# Patient Record
Sex: MALE | Race: WHITE | Employment: FULL TIME | URBAN - METROPOLITAN AREA
[De-identification: names, ages, dates, MRNs, and addresses within clinical notes are randomized per-mention and may not be internally consistent; named-entity substitution may affect disease eponyms.]

---

## 2017-02-01 ENCOUNTER — LAB ENCOUNTER (OUTPATIENT)
Dept: LAB | Age: 42
End: 2017-02-01
Attending: INTERNAL MEDICINE
Payer: COMMERCIAL

## 2017-02-01 ENCOUNTER — OFFICE VISIT (OUTPATIENT)
Dept: FAMILY MEDICINE CLINIC | Facility: CLINIC | Age: 42
End: 2017-02-01

## 2017-02-01 VITALS
HEART RATE: 62 BPM | TEMPERATURE: 98 F | SYSTOLIC BLOOD PRESSURE: 134 MMHG | BODY MASS INDEX: 33.74 KG/M2 | HEIGHT: 71 IN | RESPIRATION RATE: 16 BRPM | WEIGHT: 241 LBS | DIASTOLIC BLOOD PRESSURE: 84 MMHG

## 2017-02-01 DIAGNOSIS — M10.079 IDIOPATHIC GOUT OF ANKLE, UNSPECIFIED CHRONICITY, UNSPECIFIED LATERALITY: ICD-10-CM

## 2017-02-01 DIAGNOSIS — E66.9 OBESITY (BMI 30-39.9): ICD-10-CM

## 2017-02-01 DIAGNOSIS — R94.31 ABNORMAL EKG: ICD-10-CM

## 2017-02-01 DIAGNOSIS — R53.82 CHRONIC FATIGUE: ICD-10-CM

## 2017-02-01 DIAGNOSIS — E78.5 DYSLIPIDEMIA: ICD-10-CM

## 2017-02-01 DIAGNOSIS — E56.9 MULTIPLE VITAMIN DEFICIENCY DISEASE: ICD-10-CM

## 2017-02-01 DIAGNOSIS — N17.9 ACUTE RENAL FAILURE, UNSPECIFIED ACUTE RENAL FAILURE TYPE (HCC): Primary | ICD-10-CM

## 2017-02-01 DIAGNOSIS — N17.9 ACUTE RENAL FAILURE, UNSPECIFIED ACUTE RENAL FAILURE TYPE (HCC): ICD-10-CM

## 2017-02-01 DIAGNOSIS — I10 BENIGN ESSENTIAL HTN: ICD-10-CM

## 2017-02-01 LAB
25-HYDROXYVITAMIN D (TOTAL): 21.5 NG/ML (ref 30–100)
ALBUMIN SERPL-MCNC: 4.3 G/DL (ref 3.5–4.8)
ALP LIVER SERPL-CCNC: 82 U/L (ref 45–117)
ALT SERPL-CCNC: 39 U/L (ref 17–63)
AST SERPL-CCNC: 33 U/L (ref 15–41)
BASOPHILS # BLD AUTO: 0.05 X10(3) UL (ref 0–0.1)
BASOPHILS NFR BLD AUTO: 0.8 %
BILIRUB SERPL-MCNC: 0.7 MG/DL (ref 0.1–2)
BILIRUB UR QL STRIP.AUTO: NEGATIVE
BUN BLD-MCNC: 19 MG/DL (ref 8–20)
CALCIUM BLD-MCNC: 8.7 MG/DL (ref 8.3–10.3)
CHLORIDE: 106 MMOL/L (ref 101–111)
CLARITY UR REFRACT.AUTO: CLEAR
CO2: 24 MMOL/L (ref 22–32)
COLOR UR AUTO: YELLOW
CREAT BLD-MCNC: 1.14 MG/DL (ref 0.7–1.3)
CREAT UR-SCNC: 281 MG/DL
EOSINOPHIL # BLD AUTO: 0.05 X10(3) UL (ref 0–0.3)
EOSINOPHIL NFR BLD AUTO: 0.8 %
ERYTHROCYTE [DISTWIDTH] IN BLOOD BY AUTOMATED COUNT: 13 % (ref 11.5–16)
FSH: 4.6 MIU/ML (ref 1.4–18.1)
GLUCOSE BLD-MCNC: 92 MG/DL (ref 70–99)
GLUCOSE UR STRIP.AUTO-MCNC: NEGATIVE MG/DL
HCT VFR BLD AUTO: 44.7 % (ref 37–53)
HGB BLD-MCNC: 15.4 G/DL (ref 13–17)
IMMATURE GRANULOCYTE COUNT: 0.02 X10(3) UL (ref 0–1)
IMMATURE GRANULOCYTE RATIO %: 0.3 %
IRON SATURATION: 32 % (ref 13–45)
IRON: 197 UG/DL (ref 45–182)
KETONES UR STRIP.AUTO-MCNC: NEGATIVE MG/DL
LH: 1.5 MIU/ML (ref 1.5–9.3)
LYMPHOCYTES # BLD AUTO: 2.2 X10(3) UL (ref 0.9–4)
LYMPHOCYTES NFR BLD AUTO: 35.4 %
M PROTEIN MFR SERPL ELPH: 7.6 G/DL (ref 6.1–8.3)
MCH RBC QN AUTO: 30.2 PG (ref 27–33.2)
MCHC RBC AUTO-ENTMCNC: 34.5 G/DL (ref 31–37)
MCV RBC AUTO: 87.6 FL (ref 80–99)
MONOCYTES # BLD AUTO: 0.53 X10(3) UL (ref 0.1–0.6)
MONOCYTES NFR BLD AUTO: 8.5 %
NEUTROPHIL ABS PRELIM: 3.36 X10 (3) UL (ref 1.3–6.7)
NEUTROPHILS # BLD AUTO: 3.36 X10(3) UL (ref 1.3–6.7)
NEUTROPHILS NFR BLD AUTO: 54.2 %
NITRITE UR QL STRIP.AUTO: NEGATIVE
PH UR STRIP.AUTO: 5 [PH] (ref 4.5–8)
PLATELET # BLD AUTO: 254 10(3)UL (ref 150–450)
POTASSIUM SERPL-SCNC: 3.6 MMOL/L (ref 3.6–5.1)
PROT UR STRIP.AUTO-MCNC: NEGATIVE MG/DL
RBC # BLD AUTO: 5.1 X10(6)UL (ref 4.3–5.7)
RBC UR QL AUTO: NEGATIVE
RED CELL DISTRIBUTION WIDTH-SD: 41.4 FL (ref 35.1–46.3)
SED RATE-ML: 5 MM/HR (ref 0–12)
SODIUM SERPL-SCNC: 139 MMOL/L (ref 136–144)
SODIUM SERPL-SCNC: 166 MMOL/L
SP GR UR STRIP.AUTO: 1.03 (ref 1–1.03)
TESTOSTERONE: 513 NG/DL (ref 241–827)
TOTAL IRON BINDING CAPACITY: 620 UG/DL (ref 298–536)
TRANSFERRIN: 416 MG/DL (ref 200–360)
TSI SER-ACNC: 1.8 MIU/ML (ref 0.35–5.5)
URIC ACID: 4.5 MG/DL (ref 2.4–8.7)
UROBILINOGEN UR STRIP.AUTO-MCNC: <2 MG/DL
WBC # BLD AUTO: 6.2 X10(3) UL (ref 4–13)

## 2017-02-01 PROCEDURE — 84300 ASSAY OF URINE SODIUM: CPT

## 2017-02-01 PROCEDURE — 36415 COLL VENOUS BLD VENIPUNCTURE: CPT

## 2017-02-01 PROCEDURE — 99214 OFFICE O/P EST MOD 30 MIN: CPT | Performed by: INTERNAL MEDICINE

## 2017-02-01 PROCEDURE — 84550 ASSAY OF BLOOD/URIC ACID: CPT

## 2017-02-01 PROCEDURE — 82306 VITAMIN D 25 HYDROXY: CPT

## 2017-02-01 PROCEDURE — 85652 RBC SED RATE AUTOMATED: CPT

## 2017-02-01 PROCEDURE — 85025 COMPLETE CBC W/AUTO DIFF WBC: CPT

## 2017-02-01 PROCEDURE — 84403 ASSAY OF TOTAL TESTOSTERONE: CPT

## 2017-02-01 PROCEDURE — 93000 ELECTROCARDIOGRAM COMPLETE: CPT | Performed by: INTERNAL MEDICINE

## 2017-02-01 PROCEDURE — 83002 ASSAY OF GONADOTROPIN (LH): CPT

## 2017-02-01 PROCEDURE — 82570 ASSAY OF URINE CREATININE: CPT

## 2017-02-01 PROCEDURE — 83540 ASSAY OF IRON: CPT

## 2017-02-01 PROCEDURE — 83001 ASSAY OF GONADOTROPIN (FSH): CPT

## 2017-02-01 PROCEDURE — 84443 ASSAY THYROID STIM HORMONE: CPT

## 2017-02-01 PROCEDURE — 81001 URINALYSIS AUTO W/SCOPE: CPT

## 2017-02-01 PROCEDURE — 80053 COMPREHEN METABOLIC PANEL: CPT

## 2017-02-01 PROCEDURE — 83550 IRON BINDING TEST: CPT

## 2017-02-01 RX ORDER — ALLOPURINOL 300 MG/1
300 TABLET ORAL DAILY
Qty: 90 TABLET | Refills: 4 | Status: SHIPPED | OUTPATIENT
Start: 2017-02-01 | End: 2017-02-27

## 2017-02-01 RX ORDER — PHENTERMINE HYDROCHLORIDE 37.5 MG/1
37.5 TABLET ORAL
Qty: 30 TABLET | Refills: 0 | Status: SHIPPED | OUTPATIENT
Start: 2017-02-01 | End: 2017-02-27

## 2017-02-01 RX ORDER — ALLOPURINOL 300 MG/1
300 TABLET ORAL DAILY
Qty: 90 TABLET | Refills: 4 | Status: SHIPPED | COMMUNITY
Start: 2017-02-01 | End: 2017-04-24

## 2017-02-01 NOTE — PATIENT INSTRUCTIONS
Thank you for choosing Umesh Connor MD at [place] [address]  To Do: Dominic Gill  1. Blood pressure is too high- read below  Checkup 1 month  Start monitoring it daily in morning  2. Labs today  3. Allopurinol refilled  4.  You should think about Meal replacement therapy once or twice a day (a liquid shake taking the place of a whole meal because it's low calorie) really works. Such as slimfast or medifast or optifast can buy online. But needs to be consistent and used for long term.   Even a Seatt your doctor. 8. You want to eat quality food. Nutritious food. EAT all natural foods high in fiber, foods that are in the fresh produce section such as salad fruit vegetables, berries, nuts, lean protein like fish, chicken.   Avoid processed foods lik Are Associated With High Blood Pressure? Several potentially serious health conditions are linked to high blood pressure, including:   Atherosclerosis: a disease of the arteries caused by a buildup of plaque, or fatty material, on the inside walls of the b pressure is too high, treatment may help prevent damage to your body's organs. By [facility] staff   DASH stands for Dietary Approaches to Stop Hypertension.  The DASH diet is a lifelong approach to healthy eating that's designed to help treat or preven DASH diet include limiting carbohydrates to 55% of daily calories and dietary cholesterol to 150 mg. Try to get at least 30 grams (g) of daily fiber. Grains (6 to 8 servings a day)  Grains include bread, cereal, rice and pasta.  Examples of one serving o one as a snack, then round out your day with a dessert of fresh fruits topped with a splash of low-fat yogurt. • Leave on edible peels whenever possible.  The peels of apples, pears and most fruits with pits add interesting texture to recipes and contain fatty acids, which can help lower your total cholesterol. Nuts, seeds and legumes (4 to 5 servings a week)   Almonds, sunflower seeds, kidney beans, peas, lentils and other foods in this family are good sources of magnesium, potassium and protein.  Augie Dick whole milk, cream and eggs in your diet, along with foods made from lard, solid shortenings, and palm and coconut oils. • Avoid trans fat, commonly found in such processed foods as crackers, baked goods and fried items.    • Read food labels on margarine around 1,600 a day. You may need to adjust your serving goals based on your health or individual circumstances — something your health care team can help you decide.    Tips to cut back on sodium  The foods at the core of the DASH diet are naturally low in schedule  •To schedule Imaging or tests at [facility] Scheduling [phone], Go to [facility] A ER Building (For example: CT scans, X rays, Ultrasound, MRI)  •Cardiac Testing in ER building Building A second floor Cardiac Testing [phone] (For exa increments and must be refilled at an office visit only. If your prescription is due for a refill, you may be due for a follow-up appointment. We cannot refill your maintenance medications at a preventative wellness visit.   To best provide you care, vianey

## 2017-02-01 NOTE — PROGRESS NOTES
[facility] Internal Medicine Office Note  Chief Complaint:   Patient presents with:  Medication Follow-Up: ARF, gout, dyslipid  HTN: abnormal ekg  Fatigue  arf  And obesity    HPI:   This is a 35year old male coming in for  HPI  6 mo fu     1.  P Disp: 90 tablet Rfl: 4   Phentermine HCl 37.5 MG Oral Tab Take 1 tablet (37.5 mg total) by mouth every morning before breakfast. Disp: 30 tablet Rfl: 0   MULTIVITAMIN OR one tablet daily Disp:  Rfl:          REVIEW OF SYSTEMS:   Review of Systems   Constit person, place, and time. He displays normal reflexes. No cranial nerve deficit. Coordination and gait normal.   Skin: Skin is warm. No lesion and no rash noted. No erythema. Psychiatric: He has a normal mood and affect.  His behavior is normal.       SUSAN w/ Interpretation and Report - IN Office  -     CARD ECHO 2D DOPPLER (CPT=93455); Future  -     Cardio Referral - In Network swetha jimenez    Abnormal EKG new uncertain prognosis ? WPW refer to cards  -     CARD ECHO 2D DOPPLER (CPT=93633);  Future  -     Cardi is to call with any side effects or complications from the treatments as a result of today.    Patient Active Problem List:     JS (obstructive sleep apnea)     Gout     Blood pressure elevated without history of HTN     BPH (benign prostatic hyperplasia)

## 2017-02-02 NOTE — PROGRESS NOTES
Quick Note:    Labs here are normal, all are fine.  Kidneys normal again  Testosterone normal  Vitamins normal  ______

## 2017-02-14 ENCOUNTER — HOSPITAL ENCOUNTER (OUTPATIENT)
Dept: CV DIAGNOSTICS | Facility: HOSPITAL | Age: 42
Discharge: HOME | End: 2017-02-14
Attending: INTERNAL MEDICINE
Payer: COMMERCIAL

## 2017-02-14 DIAGNOSIS — R94.31 ABNORMAL EKG: ICD-10-CM

## 2017-02-14 DIAGNOSIS — I10 BENIGN ESSENTIAL HTN: ICD-10-CM

## 2017-02-14 PROCEDURE — 93306 TTE W/DOPPLER COMPLETE: CPT | Performed by: INTERNAL MEDICINE

## 2017-02-14 PROCEDURE — 93306 TTE W/DOPPLER COMPLETE: CPT

## 2017-02-14 NOTE — PROGRESS NOTES
Quick Note:    This echo is normal but there is mild pulmonary hypertension noted on echo- this is probably because of his JS.  My only recommendation is make sure his cpap is working well and to have checkups with his cpap doc  Thanks    ______

## 2017-02-27 ENCOUNTER — OFFICE VISIT (OUTPATIENT)
Dept: FAMILY MEDICINE CLINIC | Facility: CLINIC | Age: 42
End: 2017-02-27

## 2017-02-27 VITALS
SYSTOLIC BLOOD PRESSURE: 132 MMHG | RESPIRATION RATE: 16 BRPM | HEART RATE: 70 BPM | BODY MASS INDEX: 34.3 KG/M2 | HEIGHT: 71 IN | TEMPERATURE: 98 F | WEIGHT: 245 LBS | DIASTOLIC BLOOD PRESSURE: 88 MMHG

## 2017-02-27 DIAGNOSIS — E66.9 OBESITY (BMI 30-39.9): ICD-10-CM

## 2017-02-27 DIAGNOSIS — I27.20 PULMONARY HTN (HCC): ICD-10-CM

## 2017-02-27 DIAGNOSIS — I77.810 MILD ASCENDING AORTA DILATATION (HCC): ICD-10-CM

## 2017-02-27 DIAGNOSIS — E55.9 VITAMIN D DEFICIENCY: ICD-10-CM

## 2017-02-27 DIAGNOSIS — Z51.81 THERAPEUTIC DRUG MONITORING: Primary | ICD-10-CM

## 2017-02-27 PROBLEM — N17.9 ARF (ACUTE RENAL FAILURE) (HCC): Status: RESOLVED | Noted: 2017-02-01 | Resolved: 2017-02-27

## 2017-02-27 PROBLEM — N17.9 ARF (ACUTE RENAL FAILURE): Status: RESOLVED | Noted: 2017-02-01 | Resolved: 2017-02-27

## 2017-02-27 PROCEDURE — 99213 OFFICE O/P EST LOW 20 MIN: CPT | Performed by: INTERNAL MEDICINE

## 2017-02-27 RX ORDER — PHENTERMINE HYDROCHLORIDE 37.5 MG/1
37.5 TABLET ORAL
Qty: 30 TABLET | Refills: 0 | Status: SHIPPED | OUTPATIENT
Start: 2017-02-27 | End: 2017-03-27

## 2017-02-27 NOTE — PATIENT INSTRUCTIONS
Thank you for choosing Navin Willis MD at [place] [address]  To Do: Ni Hanks  1. Ascending aortic arch is slightly enlarged see dr Regino Pierre  And see him about pulmonary artery blood pressure elevation as well  2.  Start phentermine    • Please si • Please call our office about any questions regarding your treatment/medicines/tests as a result of today's visit.  For your safety, read the entire package insert of all medicines prescribed to you and be aware of all of the risks of treatment even beyon Systemic hypertension means the pressure is too high in blood vessels throughout the body. A person with pulmonary hypertension may also have systemic hypertension.    Causes of pulmonary hypertension  The cause of pulmonary hypertension is sometimes unknow · An electrocardiogram (ECG or EKG). This test records the heart’s electrical activity. · An echocardiogram (echo). This test uses sound waves to create a moving picture of the heart. · Pulmonary function tests.  These tests measure breathing and lung cap

## 2017-02-27 NOTE — PROGRESS NOTES
[facility] Internal Medicine Office Note  Chief Complaint:   Patient presents with:  Test Results: labs and 2D echo/ has appt with Dr. Alonso Robledo on 3/14/17  Weight Check      HPI:   This is a 35year old male coming in for  HPI  Obesity fu- pt here f stated age, well groomed. With brown hair  Physical Exam    Constitutional: He is obese. He appears well-developed. HENT:   Head: Normocephalic. Cardiovascular: Normal rate. No murmur heard. Pulmonary/Chest: Effort normal. No respiratory distress. hypertension and/or regurgitant cardiac valvular disease, tachycardia  Central nervous system: Dizziness, dysphoria, euphoria, headache, insomnia, overstimulation, psychosis, restlessness  Dermatologic: Urticaria   Endocrine & metabolic: Changes in libido Active Problem List:     JS (obstructive sleep apnea)     Gout     Blood pressure elevated without history of HTN     BPH (benign prostatic hyperplasia)     Dyslipidemia     Obesity (BMI 30-39. 9)     Vitamin D deficiency     Therapeutic drug monitoring

## 2017-03-27 ENCOUNTER — OFFICE VISIT (OUTPATIENT)
Dept: FAMILY MEDICINE CLINIC | Facility: CLINIC | Age: 42
End: 2017-03-27

## 2017-03-27 VITALS
RESPIRATION RATE: 16 BRPM | HEART RATE: 78 BPM | WEIGHT: 236 LBS | TEMPERATURE: 98 F | HEIGHT: 71 IN | SYSTOLIC BLOOD PRESSURE: 130 MMHG | DIASTOLIC BLOOD PRESSURE: 80 MMHG | BODY MASS INDEX: 33.04 KG/M2

## 2017-03-27 DIAGNOSIS — I27.20 PULMONARY HTN (HCC): ICD-10-CM

## 2017-03-27 DIAGNOSIS — E66.9 OBESITY (BMI 30-39.9): ICD-10-CM

## 2017-03-27 DIAGNOSIS — Z51.81 THERAPEUTIC DRUG MONITORING: Primary | ICD-10-CM

## 2017-03-27 PROCEDURE — 99213 OFFICE O/P EST LOW 20 MIN: CPT | Performed by: INTERNAL MEDICINE

## 2017-03-27 RX ORDER — DIETHYLPROPION HYDROCHLORIDE 25 MG/1
1 TABLET ORAL DAILY
Qty: 30 TABLET | Refills: 0 | Status: SHIPPED | OUTPATIENT
Start: 2017-03-27 | End: 2017-04-24

## 2017-03-27 RX ORDER — PHENTERMINE HYDROCHLORIDE 37.5 MG/1
37.5 TABLET ORAL
Qty: 30 TABLET | Refills: 0 | Status: SHIPPED | OUTPATIENT
Start: 2017-03-27 | End: 2017-04-24

## 2017-03-27 NOTE — PATIENT INSTRUCTIONS
Thank you for choosing Patric Espinoza MD at [place] [address]  To Do: Dot Mealing  1. Continue phentermine in morning  2. Add diethylproprion with lunch  3.  Checkup 1 month    • Please signup for MY CHART, which is electronic access to your record quality of life.     Referrals, and Radiology Information:    If your insurance requires a referral to a specialist, please allow 5 business days to process your referral request.    If Lilian Kirk MD orders a CT or MRI, it may take up to 10 business days

## 2017-03-27 NOTE — PROGRESS NOTES
[facility] Internal Medicine Office Note  Chief Complaint:   Patient presents with:  Weight Check: start of month 2/ lost 9 lbs      HPI:   This is a 35year old male coming in for  HPI  Obesity  Lost 9 lbs in 1 mo  Tolerating med  +increased mary well-developed. Cardiovascular: Normal rate. No murmur heard. Pulmonary/Chest: Effort normal. No respiratory distress. He has no wheezes.        ASSESSMENT AND PLAN:   Christiano Cortes is a 35year old male with  Therapeutic drug monitoring  (prima hyperplasia)     Dyslipidemia     Obesity (BMI 30-39. 9)     Vitamin D deficiency     Therapeutic drug monitoring     Pulmonary HTN ([address]     Mild ascending aorta dilatation ([address]      Networked reference to record EAP   Depression Screening (PHQ-2/PHQ-9):

## 2017-04-24 ENCOUNTER — OFFICE VISIT (OUTPATIENT)
Dept: FAMILY MEDICINE CLINIC | Facility: CLINIC | Age: 42
End: 2017-04-24

## 2017-04-24 VITALS
RESPIRATION RATE: 16 BRPM | SYSTOLIC BLOOD PRESSURE: 120 MMHG | BODY MASS INDEX: 31.64 KG/M2 | HEIGHT: 71 IN | HEART RATE: 72 BPM | WEIGHT: 226 LBS | DIASTOLIC BLOOD PRESSURE: 80 MMHG | TEMPERATURE: 99 F

## 2017-04-24 DIAGNOSIS — E66.9 OBESITY (BMI 30-39.9): ICD-10-CM

## 2017-04-24 DIAGNOSIS — I27.20 PULMONARY HTN (HCC): ICD-10-CM

## 2017-04-24 DIAGNOSIS — I77.810 MILD ASCENDING AORTA DILATATION (HCC): ICD-10-CM

## 2017-04-24 DIAGNOSIS — Z51.81 THERAPEUTIC DRUG MONITORING: Primary | ICD-10-CM

## 2017-04-24 DIAGNOSIS — R00.2 PALPITATION: ICD-10-CM

## 2017-04-24 PROCEDURE — 99214 OFFICE O/P EST MOD 30 MIN: CPT | Performed by: INTERNAL MEDICINE

## 2017-04-24 PROCEDURE — 93000 ELECTROCARDIOGRAM COMPLETE: CPT | Performed by: INTERNAL MEDICINE

## 2017-04-24 RX ORDER — ALLOPURINOL 300 MG/1
300 TABLET ORAL DAILY
Qty: 90 TABLET | Refills: 4 | Status: SHIPPED | OUTPATIENT
Start: 2017-04-24

## 2017-04-24 RX ORDER — PHENTERMINE HYDROCHLORIDE 37.5 MG/1
37.5 TABLET ORAL
Qty: 30 TABLET | Refills: 0 | Status: SHIPPED | OUTPATIENT
Start: 2017-04-24 | End: 2017-05-23

## 2017-04-24 NOTE — PROGRESS NOTES
[facility] Internal Medicine Office Note  Chief Complaint:   Patient presents with:  Weight Check: start of month 3/ lost 10 lbs  pulm htn  palpitation  HPI:   This is a 35year old male coming in for  HPI  Obesity, start of month 3  Tolerating w Ht 71\"  Wt 226 lb  BMI 31.53 kg/m2 Estimated body mass index is 31.53 kg/(m^2) as calculated from the following:    Height as of this encounter: 71\". Weight as of this encounter: 226 lb. Vital signs reviewed. Appears stated age, well groomed.   With orders of the defined types were placed in this encounter.        Meds & Refills for this Visit:  Signed Prescriptions Disp Refills    Phentermine HCl 37.5 MG Oral Tab 30 tablet 0      Sig: Take 1 tablet (37.5 mg total) by mouth every morning before breakfa

## 2017-04-24 NOTE — PATIENT INSTRUCTIONS
Thank you for choosing Annette Campos MD at [place] [address]  To Do: Denia Ramos  1.  Lower the carb, ketogenic diet is less than 50 gram total carb  Your weight:  Wt Readings from Last 6 Encounters:  04/24/17 : 226 lb  03/27/17 : 236 lb  02/27/17 replacements (pricey) can work but taking the place of the WHOLE meal.    For Medifast products:  Look at website [employer]/programsProducts/    3. Use a pedometer to count your steps (On sale Omron brand for example on [employer] com) for the list below. No dairy. 9. Weight loss medications- Current FDA approved meds for weight loss are Phentermine, Orlistat, Phentermine/Topiramate ER, and Lorcaserin/Belviq, Saxenda, and Contrave (wellbutrin/naltrexone), read about them    10.  Avoid bad [place] at [facility].  [address] Mon-Fri at 8am-7:30pm, and Sat/Sun 9am-430pm  Also at [address]  Call [phone] for info    • Please call our office about any questions regarding your treatment/medicines/tests as a result of today's vis

## 2017-05-23 ENCOUNTER — OFFICE VISIT (OUTPATIENT)
Dept: FAMILY MEDICINE CLINIC | Facility: CLINIC | Age: 42
End: 2017-05-23

## 2017-05-23 VITALS
TEMPERATURE: 98 F | HEART RATE: 70 BPM | RESPIRATION RATE: 16 BRPM | BODY MASS INDEX: 31.22 KG/M2 | DIASTOLIC BLOOD PRESSURE: 70 MMHG | WEIGHT: 223 LBS | HEIGHT: 71 IN | SYSTOLIC BLOOD PRESSURE: 120 MMHG

## 2017-05-23 DIAGNOSIS — E66.9 OBESITY (BMI 30-39.9): ICD-10-CM

## 2017-05-23 DIAGNOSIS — M10.079 IDIOPATHIC GOUT OF ANKLE, UNSPECIFIED CHRONICITY, UNSPECIFIED LATERALITY: ICD-10-CM

## 2017-05-23 DIAGNOSIS — Z51.81 THERAPEUTIC DRUG MONITORING: Primary | ICD-10-CM

## 2017-05-23 PROCEDURE — 99213 OFFICE O/P EST LOW 20 MIN: CPT | Performed by: INTERNAL MEDICINE

## 2017-05-23 RX ORDER — DIETHYLPROPION HYDROCHLORIDE 25 MG/1
1 TABLET ORAL DAILY
Qty: 30 TABLET | Refills: 0 | Status: SHIPPED | OUTPATIENT
Start: 2017-05-23 | End: 2017-06-27

## 2017-05-23 RX ORDER — PHENTERMINE HYDROCHLORIDE 37.5 MG/1
37.5 TABLET ORAL
Qty: 30 TABLET | Refills: 0 | Status: SHIPPED | OUTPATIENT
Start: 2017-05-23 | End: 2017-06-27

## 2017-05-23 NOTE — PROGRESS NOTES
[facility] Internal Medicine Office Note  Chief Complaint:   Patient presents with:  Weight Check: start of month 4/ lost 3 lbs      HPI:   This is a 35year old male coming in for  HPI  Obesity  Start mo 4  Pt lost 3 more lbs  Total lost 20 lbs well-developed. Cardiovascular: Normal rate. No murmur heard. Pulmonary/Chest: Effort normal. No respiratory distress. He has no wheezes.        ASSESSMENT AND PLAN:   Sandra Bradford is a 35year old male with  Therapeutic drug monitoring  (prima libido  Gastrointestinal: Constipation, diarrhea, unpleasant taste, xerostomia  Genitourinary: Impotence, incontinence, oliguria  Neuromuscular & skeletal: Tremor  Contraindications   Hypersensitivity or idiosyncrasy to phentermine or other sympathomimetic Pulmonary HTN (HCC)     Mild ascending aorta dilatation ([facility]      Networked reference to record EAP   Depression Screening (PHQ-2/PHQ-9): Over the LAST 2 WEEKS

## 2017-05-23 NOTE — PATIENT INSTRUCTIONS
Thank you for choosing Murphy Vidal MD at [place] [address]  To Do: Trixie Guzman  1. Add diethylproprion with breakfast  2.  Continue phentermine  Your weight:  Wt Readings from Last 6 Encounters:  05/23/17 : 223 lb  04/24/17 : 226 lb  03/27/17 : replacements (pricey) can work but taking the place of the WHOLE meal.    For Medifast products:  Look at website [employer]/programsProducts/    3. Use a pedometer to count your steps (On sale Omron brand for example on [employer] com) for branede lua. Avoid carbs see the list below.     9. Weight loss medications- Current FDA approved meds for weight loss are Phentermine, Orlistat, Phentermine/Topiramate ER, and Lorcaserin/Belviq, Saxenda, and Contrave (wellbutrin/naltrexone), read about A  •Walk in Clinic in [address] [place] [address] at [facility].  [address] Mon-Fri at 8am-7:30pm, and Sat/Sun 9am-430pm  Also at [address]  Call [phone] for info    • Please call our office about any questions regarding your treatment/medicines/tests as a

## 2017-06-27 ENCOUNTER — OFFICE VISIT (OUTPATIENT)
Dept: FAMILY MEDICINE CLINIC | Facility: CLINIC | Age: 42
End: 2017-06-27

## 2017-06-27 VITALS
BODY MASS INDEX: 30.94 KG/M2 | HEART RATE: 70 BPM | TEMPERATURE: 98 F | RESPIRATION RATE: 16 BRPM | WEIGHT: 221 LBS | DIASTOLIC BLOOD PRESSURE: 78 MMHG | SYSTOLIC BLOOD PRESSURE: 126 MMHG | HEIGHT: 71 IN

## 2017-06-27 DIAGNOSIS — Z51.81 THERAPEUTIC DRUG MONITORING: Primary | ICD-10-CM

## 2017-06-27 DIAGNOSIS — E66.9 OBESITY (BMI 30-39.9): ICD-10-CM

## 2017-06-27 PROCEDURE — 99213 OFFICE O/P EST LOW 20 MIN: CPT | Performed by: INTERNAL MEDICINE

## 2017-06-27 RX ORDER — PHENTERMINE HYDROCHLORIDE 37.5 MG/1
37.5 TABLET ORAL
Qty: 30 TABLET | Refills: 0 | Status: SHIPPED | OUTPATIENT
Start: 2017-06-27 | End: 2017-07-24

## 2017-06-27 RX ORDER — DIETHYLPROPION HYDROCHLORIDE 25 MG/1
1 TABLET ORAL DAILY
Qty: 30 TABLET | Refills: 0 | Status: SHIPPED | OUTPATIENT
Start: 2017-06-27 | End: 2017-07-24

## 2017-06-27 NOTE — PROGRESS NOTES
[facility] Internal Medicine Office Note  Chief Complaint:   Patient presents with:  Weight Check: start of month 5/ lost 2 lbs      HPI:   This is a 35year old male coming in for  HPI  Start mo 5  1 mo fu on obesity  Pt lost 2 lbs  In total he No murmur heard. Pulmonary/Chest: Effort normal. No respiratory distress. He has no wheezes. ASSESSMENT AND PLAN:   Ni Hanks is a 35year old male with  Therapeutic drug monitoring  (primary encounter diagnosis)  Obesity (BMI 30-39. 9) taste, xerostomia  Genitourinary: Impotence, incontinence, oliguria  Neuromuscular & skeletal: Tremor  Contraindications   Hypersensitivity or idiosyncrasy to phentermine or other sympathomimetic amines or any component of the formulation; history of cardi the LAST 2 WEEKS

## 2017-06-27 NOTE — PATIENT INSTRUCTIONS
Thank you for choosing Brad Isabel MD at [place] [address]  To Do: Mike Alcantar  1. Weight medication stable  Continue below  2.  Checkup 1 month  Your weight:  Wt Readings from Last 6 Encounters:  06/27/17 : 221 lb  05/23/17 : 223 lb  04/24/17 : and meat of every kind except processed meat like salami, irvin, bologna. Low calorie is less than 4320-6409 calories a day. Patients need to follow a diet that fits with their lifestyle, you have to eat healthy foods.   No more fast foods and restaurants weight loss are Phentermine, Orlistat, Phentermine/Topiramate ER, and Lorcaserin/Belviq, Saxenda, and Contrave (wellbutrin/naltrexone), read about them    8.  Avoid bad carbs but focus on good carbs  BAD carbs have added sugar such as  - soft drinks   - spo Testing in ER building Building A second floor Cardiac Testing [phone] (For example: Holter Monitor, Cardiac Stress tests,Event Monitor, or 2D Echocardiograms)  •[facility] call [phone] usually in [address] [facility] in Clinic in [address] [place] [address] maintenance medications at a preventative wellness visit. To best provide you care, patients receiving maintenance medications need to be seen at least twice a year. Gamal Lopez

## 2017-07-24 ENCOUNTER — OFFICE VISIT (OUTPATIENT)
Dept: FAMILY MEDICINE CLINIC | Facility: CLINIC | Age: 42
End: 2017-07-24

## 2017-07-24 VITALS
TEMPERATURE: 97 F | HEART RATE: 78 BPM | DIASTOLIC BLOOD PRESSURE: 80 MMHG | BODY MASS INDEX: 32.34 KG/M2 | SYSTOLIC BLOOD PRESSURE: 124 MMHG | RESPIRATION RATE: 16 BRPM | HEIGHT: 71 IN | WEIGHT: 231 LBS

## 2017-07-24 DIAGNOSIS — Z51.81 THERAPEUTIC DRUG MONITORING: Primary | ICD-10-CM

## 2017-07-24 DIAGNOSIS — E66.9 OBESITY (BMI 30-39.9): ICD-10-CM

## 2017-07-24 PROCEDURE — 99213 OFFICE O/P EST LOW 20 MIN: CPT | Performed by: INTERNAL MEDICINE

## 2017-07-24 RX ORDER — PHENTERMINE HYDROCHLORIDE 15 MG/1
15 CAPSULE ORAL EVERY MORNING
Qty: 90 CAPSULE | Refills: 1 | Status: SHIPPED | OUTPATIENT
Start: 2017-07-24 | End: 2019-03-09

## 2017-07-24 RX ORDER — TOPIRAMATE 50 MG/1
50 TABLET, FILM COATED ORAL EVERY EVENING
Qty: 90 TABLET | Refills: 1 | Status: SHIPPED | OUTPATIENT
Start: 2017-07-24 | End: 2019-03-09

## 2017-07-24 NOTE — PROGRESS NOTES
[facility] Internal Medicine Office Note  Chief Complaint:   Patient presents with:  Weight Check: start of month 6 / gained 10 lbs.       HPI:   This is a 35year old male coming in for  HPI  Obesity fu    Pt gained 10 lbs  Is wearing his steel t Vital signs reviewed. Appears stated age, well groomed. With brown hair  Physical Exam    Constitutional: He appears well-developed. Cardiovascular: Normal rate. No murmur heard. Pulmonary/Chest: Effort normal. No respiratory distress.  He has no w disease, tachycardia  Central nervous system: Dizziness, dysphoria, euphoria, headache, insomnia, overstimulation, psychosis, restlessness  Dermatologic: Urticaria   Endocrine & metabolic: Changes in libido  Gastrointestinal: Constipation, diarrhea, unplea Dyslipidemia     Obesity (BMI 30-39. 9)     Vitamin D deficiency     Therapeutic drug monitoring     Pulmonary HTN ([address]     Mild ascending aorta dilatation ([address]      Networked reference to record EAP   Depression Screening (PHQ-2/PHQ-9): Over the LAST 2 W

## 2017-07-24 NOTE — PATIENT INSTRUCTIONS
Thank you for choosing MD Saritha at [place] [address]  To Do: Morro Plate  1.  Phentermine continue it at 15mg  At lunchtime  2. topamax 50mg in evening time- new medicine for weight loss  Take those two for weight loss as directed    Your rice, bagels, crackers, chips. Fill your plate with fruits, vegetables, nuts, whole grains, and meat of every kind except processed meat like salami, irvin, bologna. Low calorie is less than 1325-0418 calories a day.   Patients need to follow a diet that sugar such as  - soft drinks   - sport drinks   - fruit drinks   - beer   - french fries   - white rice   - sugar-sweetened cereals   - bagels   - baguettes   - croissants   - potato chips   - cookies   - white crackers   - brownies   - cakes   - candy   - usually in [facility] in Clinic in [place] at [facility].  [address] Mon-Fri at 8am-7:30pm, and Sat/Sun 9am-430pm  Also at [address]  Call [phone] for info    • Please call our office about any questions regarding your treatment/medi

## 2017-10-06 ENCOUNTER — TELEPHONE (OUTPATIENT)
Dept: FAMILY MEDICINE CLINIC | Facility: CLINIC | Age: 42
End: 2017-10-06

## 2017-10-06 ENCOUNTER — HOSPITAL ENCOUNTER (OUTPATIENT)
Dept: GENERAL RADIOLOGY | Facility: HOSPITAL | Age: 42
Discharge: HOME | End: 2017-10-06
Attending: PREVENTIVE MEDICINE

## 2017-10-06 ENCOUNTER — OFFICE VISIT (OUTPATIENT)
Dept: OTHER | Facility: HOSPITAL | Age: 42
End: 2017-10-06
Attending: PREVENTIVE MEDICINE

## 2017-10-06 DIAGNOSIS — Z00.00 PHYSICAL EXAM: Primary | ICD-10-CM

## 2017-10-06 PROCEDURE — 80053 COMPREHEN METABOLIC PANEL: CPT

## 2017-10-06 PROCEDURE — 93005 ELECTROCARDIOGRAM TRACING: CPT

## 2017-10-06 PROCEDURE — 80061 LIPID PANEL: CPT

## 2017-10-06 PROCEDURE — 85025 COMPLETE CBC W/AUTO DIFF WBC: CPT

## 2017-10-06 PROCEDURE — 81003 URINALYSIS AUTO W/O SCOPE: CPT

## 2017-10-06 NOTE — TELEPHONE ENCOUNTER
Dr. 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 He stated that he needs to have the last three months of his C pap card read.  He stated that Dr. Anurag Saavedra referred him to go a place to have it read and the business is cl

## 2017-10-06 NOTE — TELEPHONE ENCOUNTER
Time started: 317  Time ended: 322  Total time spent on chart: 5 min    Patient got C-Pap machine and had sleep study with [facility] - It is Dr. Shikha Ortega.   I gave patient information to contact his group to see if he can get his C-pap read

## 2017-10-09 ENCOUNTER — TELEPHONE (OUTPATIENT)
Dept: FAMILY MEDICINE CLINIC | Facility: CLINIC | Age: 42
End: 2017-10-09

## 2017-10-09 NOTE — TELEPHONE ENCOUNTER
Time started: 0944    Time ended: 0946    Total time spent on chart: 2min    Patient notified. He states he has been off phentermine for one month and will not take.  Offered to schedule appointment with patient - he said he needs to call back when he has

## 2017-11-01 ENCOUNTER — HOSPITAL ENCOUNTER (OUTPATIENT)
Dept: GENERAL RADIOLOGY | Facility: HOSPITAL | Age: 42
Discharge: HOME | End: 2017-11-01
Attending: PREVENTIVE MEDICINE
Payer: OTHER MISCELLANEOUS

## 2017-11-01 ENCOUNTER — OFFICE VISIT (OUTPATIENT)
Dept: OTHER | Facility: HOSPITAL | Age: 42
End: 2017-11-01
Attending: FAMILY MEDICINE
Payer: OTHER MISCELLANEOUS

## 2017-11-01 DIAGNOSIS — R07.81 RIB PAIN ON RIGHT SIDE: Primary | ICD-10-CM

## 2017-11-01 PROCEDURE — 71101 X-RAY EXAM UNILAT RIBS/CHEST: CPT | Performed by: PREVENTIVE MEDICINE

## 2017-11-08 ENCOUNTER — APPOINTMENT (OUTPATIENT)
Dept: OTHER | Facility: HOSPITAL | Age: 42
End: 2017-11-08
Attending: FAMILY MEDICINE
Payer: OTHER MISCELLANEOUS

## 2017-11-22 ENCOUNTER — APPOINTMENT (OUTPATIENT)
Dept: OTHER | Facility: HOSPITAL | Age: 42
End: 2017-11-22
Attending: PREVENTIVE MEDICINE
Payer: OTHER MISCELLANEOUS

## 2018-03-26 ENCOUNTER — HOSPITAL ENCOUNTER (OUTPATIENT)
Dept: GENERAL RADIOLOGY | Facility: HOSPITAL | Age: 43
Discharge: HOME | End: 2018-03-26
Attending: PREVENTIVE MEDICINE
Payer: OTHER MISCELLANEOUS

## 2018-03-26 ENCOUNTER — OFFICE VISIT (OUTPATIENT)
Dept: OTHER | Facility: HOSPITAL | Age: 43
End: 2018-03-26
Attending: PREVENTIVE MEDICINE
Payer: OTHER MISCELLANEOUS

## 2018-03-26 DIAGNOSIS — M25.511 ACUTE PAIN OF RIGHT SHOULDER: Primary | ICD-10-CM

## 2018-03-26 PROCEDURE — 73030 X-RAY EXAM OF SHOULDER: CPT | Performed by: PREVENTIVE MEDICINE

## 2018-04-23 ENCOUNTER — APPOINTMENT (OUTPATIENT)
Dept: OTHER | Facility: HOSPITAL | Age: 43
End: 2018-04-23
Attending: FAMILY MEDICINE

## 2019-03-05 ENCOUNTER — HOSPITAL ENCOUNTER (OUTPATIENT)
Age: 44
Discharge: HOME | End: 2019-03-05
Payer: COMMERCIAL

## 2019-03-05 VITALS
DIASTOLIC BLOOD PRESSURE: 91 MMHG | SYSTOLIC BLOOD PRESSURE: 123 MMHG | TEMPERATURE: 99 F | HEART RATE: 66 BPM | RESPIRATION RATE: 20 BRPM | OXYGEN SATURATION: 98 %

## 2019-03-05 DIAGNOSIS — H10.33 ACUTE CONJUNCTIVITIS OF BOTH EYES, UNSPECIFIED ACUTE CONJUNCTIVITIS TYPE: ICD-10-CM

## 2019-03-05 DIAGNOSIS — J01.10 ACUTE NON-RECURRENT FRONTAL SINUSITIS: Primary | ICD-10-CM

## 2019-03-05 PROCEDURE — 99213 OFFICE O/P EST LOW 20 MIN: CPT

## 2019-03-05 PROCEDURE — 99204 OFFICE O/P NEW MOD 45 MIN: CPT

## 2019-03-05 RX ORDER — AMOXICILLIN AND CLAVULANATE POTASSIUM 875; 125 MG/1; MG/1
1 TABLET, FILM COATED ORAL 2 TIMES DAILY
Qty: 20 TABLET | Refills: 0 | Status: SHIPPED | OUTPATIENT
Start: 2019-03-05 | End: 2019-03-15

## 2019-03-05 RX ORDER — FLUTICASONE PROPIONATE 50 MCG
2 SPRAY, SUSPENSION (ML) NASAL DAILY
Qty: 16 G | Refills: 0 | Status: SHIPPED | OUTPATIENT
Start: 2019-03-05 | End: 2019-04-04

## 2019-03-05 RX ORDER — OFLOXACIN 3 MG/ML
1 SOLUTION/ DROPS OPHTHALMIC 4 TIMES DAILY
Qty: 1 BOTTLE | Refills: 0 | Status: SHIPPED | OUTPATIENT
Start: 2019-03-05

## 2019-03-05 NOTE — ED INITIAL ASSESSMENT (HPI)
Sinus pressure- x 1 week. Pt took robitussin. States his sneezing decreased and  Dried  Nasal drainage. Redness noted on both eyes. Nasal congestion - x 1 week , sweating  Last weekend.

## 2019-03-05 NOTE — ED PROVIDER NOTES
Patient Seen in: [facility]    History   Patient presents with:  Nasal Congestion  Sinus Problem    Stated Complaint: URI/STUFFINESS X 5 DAYS    HPI  40-year-old male with history of hypertension and hyperlipidemia presents with membrane, external ear and ear canal normal.   Left Ear: Tympanic membrane, external ear and ear canal normal.   Nose: Mucosal edema and rhinorrhea present. Right sinus exhibits frontal sinus tenderness. Left sinus exhibits frontal sinus tenderness.    [place] [address]  [phone]    Call in 2 days  As needed        Medications Prescribed:  Discharge Medication List as of 3/5/2019  9:00 AM    START taking these medications    Amoxicillin-Pot Clavulanate 875-125 MG Oral Tab  Take 1 tablet by mouth 2 (two) times da

## 2019-03-09 ENCOUNTER — HOSPITAL ENCOUNTER (OUTPATIENT)
Age: 44
Discharge: HOME | End: 2019-03-09
Attending: FAMILY MEDICINE
Payer: COMMERCIAL

## 2019-03-09 VITALS
TEMPERATURE: 98 F | HEIGHT: 71 IN | WEIGHT: 242 LBS | HEART RATE: 60 BPM | BODY MASS INDEX: 33.88 KG/M2 | DIASTOLIC BLOOD PRESSURE: 93 MMHG | OXYGEN SATURATION: 99 % | RESPIRATION RATE: 14 BRPM | SYSTOLIC BLOOD PRESSURE: 143 MMHG

## 2019-03-09 DIAGNOSIS — H10.023 OTHER MUCOPURULENT CONJUNCTIVITIS OF BOTH EYES: Primary | ICD-10-CM

## 2019-03-09 DIAGNOSIS — J01.00 ACUTE NON-RECURRENT MAXILLARY SINUSITIS: ICD-10-CM

## 2019-03-09 PROCEDURE — 99211 OFF/OP EST MAY X REQ PHY/QHP: CPT

## 2019-03-09 NOTE — ED PROVIDER NOTES
Patient Seen in: [facility]    History   Patient presents with: Eye Visual Problem (opthalmic): both    Stated Complaint: [address]    HPI    This 15-year-old male presents to the office for a new work note.   The patient had bee in HPI. Constitutional and vital signs reviewed. All other systems reviewed and negative except as noted above.     Physical Exam     ED Triage Vitals [03/09/19 0822]   BP (!) 151/98   Pulse 60   Resp 14   Temp 98 °F (36.7 °C)   Temp src Temporal   Sp [address]  [phone]    Schedule an appointment as soon as possible for a visit in 3 days  If symptoms worsen        Medications Prescribed:  Current Discharge Medication List          Finish your antibiotic as prescribed.   Con

## 2019-03-09 NOTE — ED INITIAL ASSESSMENT (HPI)
Pt. Was seen here a few days ago, diagnosed with uri & pink eye. Has been on his medication. His boss made him come back, even though he had a work release note.  Pt. States he feels much better

## 2022-08-14 ENCOUNTER — ORDER TRANSCRIPTION (OUTPATIENT)
Dept: ADMINISTRATIVE | Facility: HOSPITAL | Age: 47
End: 2022-08-14

## 2022-08-14 DIAGNOSIS — Z13.6 SCREENING FOR CARDIOVASCULAR CONDITION: Primary | ICD-10-CM

## 2022-08-19 ENCOUNTER — HOSPITAL ENCOUNTER (OUTPATIENT)
Dept: CT IMAGING | Facility: HOSPITAL | Age: 47
Discharge: HOME | End: 2022-08-19
Attending: FAMILY MEDICINE

## 2022-08-19 VITALS — WEIGHT: 242 LBS | BODY MASS INDEX: 33.88 KG/M2 | HEIGHT: 71 IN

## 2022-08-19 DIAGNOSIS — Z13.6 SCREENING FOR CARDIOVASCULAR CONDITION: ICD-10-CM

## 2022-08-19 LAB
GLUCOSE POC: 204 MG/DL (ref 70–100)
HDL POC: 31 MG/DL (ref 40–60)
LDL POC: 150 MG/DL (ref 0–130)
TOTAL CHOLESTEROL POC: 214 MG/DL (ref 0–200)
TRIGLYCERIDES POC: 165 MG/DL (ref 0–200)

## 2022-09-29 ENCOUNTER — OFFICE VISIT (OUTPATIENT)
Dept: FAMILY MEDICINE CLINIC | Facility: CLINIC | Age: 47
End: 2022-09-29

## 2022-09-29 VITALS
TEMPERATURE: 98 F | WEIGHT: 256 LBS | HEIGHT: 71 IN | OXYGEN SATURATION: 98 % | BODY MASS INDEX: 35.84 KG/M2 | HEART RATE: 74 BPM | RESPIRATION RATE: 16 BRPM | DIASTOLIC BLOOD PRESSURE: 88 MMHG | SYSTOLIC BLOOD PRESSURE: 134 MMHG

## 2022-09-29 DIAGNOSIS — Z00.00 WELLNESS EXAMINATION: Primary | ICD-10-CM

## 2022-09-29 DIAGNOSIS — I25.10 CORONARY ARTERY DISEASE WITHOUT ANGINA PECTORIS, UNSPECIFIED VESSEL OR LESION TYPE, UNSPECIFIED WHETHER NATIVE OR TRANSPLANTED HEART: ICD-10-CM

## 2022-09-29 DIAGNOSIS — Z12.11 SCREEN FOR COLON CANCER: ICD-10-CM

## 2022-09-29 PROCEDURE — 3008F BODY MASS INDEX DOCD: CPT | Performed by: FAMILY MEDICINE

## 2022-09-29 PROCEDURE — 99213 OFFICE O/P EST LOW 20 MIN: CPT | Performed by: FAMILY MEDICINE

## 2022-09-29 PROCEDURE — 3079F DIAST BP 80-89 MM HG: CPT | Performed by: FAMILY MEDICINE

## 2022-09-29 PROCEDURE — 3075F SYST BP GE 130 - 139MM HG: CPT | Performed by: FAMILY MEDICINE

## 2022-09-29 PROCEDURE — 99386 PREV VISIT NEW AGE 40-64: CPT | Performed by: FAMILY MEDICINE

## 2022-09-29 RX ORDER — ROSUVASTATIN CALCIUM 20 MG/1
20 TABLET, COATED ORAL NIGHTLY
Qty: 90 TABLET | Refills: 1 | Status: SHIPPED | OUTPATIENT
Start: 2022-09-29

## 2022-10-04 ENCOUNTER — TELEPHONE (OUTPATIENT)
Dept: FAMILY MEDICINE CLINIC | Facility: CLINIC | Age: 47
End: 2022-10-04

## 2022-10-04 NOTE — TELEPHONE ENCOUNTER
LM for Dr. Mason Beckham assistant advising that pt asking our office about orders for MRSA testing 3 days in a row at 24 hour intervals. Asked for a call back to clarify if MRSA testing orders will be ordered by their office or if they need to be ordered by our office. Provided direct call back number to this writer for MA to return call.

## 2022-10-04 NOTE — TELEPHONE ENCOUNTER
Appointment Request From: Ritchie Mclain      With Provider: Fay Cunningham MD [-Primary Care Physician-]      Preferred Date Range: Any date 10/4/2022 or later      Preferred Times: Any      Reason: To address the following health maintenance concerns. Colorectal Cancer Screening      Comments:   Went to schedule appointment and was asked a series of questions. One of them was if I was ever positive for MRSA. I replied yes over a decade ago. I was then told that I need to be tested 3 days in a row at 24 hr. intervolves to make sure I am negative.  Who do I see and where do I go for these test?

## 2022-10-05 ENCOUNTER — LAB ENCOUNTER (OUTPATIENT)
Dept: LAB | Facility: HOSPITAL | Age: 47
End: 2022-10-05
Attending: FAMILY MEDICINE
Payer: COMMERCIAL

## 2022-10-05 DIAGNOSIS — Z00.00 WELLNESS EXAMINATION: ICD-10-CM

## 2022-10-05 DIAGNOSIS — Z86.14 HISTORY OF MRSA INFECTION: ICD-10-CM

## 2022-10-05 LAB
ALBUMIN SERPL-MCNC: 3.9 G/DL (ref 3.4–5)
ALBUMIN/GLOB SERPL: 1.1 {RATIO} (ref 1–2)
ALP LIVER SERPL-CCNC: 69 U/L
ALT SERPL-CCNC: 39 U/L
ANION GAP SERPL CALC-SCNC: 4 MMOL/L (ref 0–18)
AST SERPL-CCNC: 27 U/L (ref 15–37)
BASOPHILS # BLD AUTO: 0.05 X10(3) UL (ref 0–0.2)
BASOPHILS NFR BLD AUTO: 0.7 %
BILIRUB SERPL-MCNC: 1 MG/DL (ref 0.1–2)
BUN BLD-MCNC: 19 MG/DL (ref 7–18)
CALCIUM BLD-MCNC: 9.5 MG/DL (ref 8.5–10.1)
CHLORIDE SERPL-SCNC: 105 MMOL/L (ref 98–112)
CHOLEST SERPL-MCNC: 212 MG/DL (ref ?–200)
CO2 SERPL-SCNC: 30 MMOL/L (ref 21–32)
CREAT BLD-MCNC: 1.41 MG/DL
EOSINOPHIL # BLD AUTO: 0.08 X10(3) UL (ref 0–0.7)
EOSINOPHIL NFR BLD AUTO: 1.2 %
ERYTHROCYTE [DISTWIDTH] IN BLOOD BY AUTOMATED COUNT: 12.1 %
FASTING PATIENT LIPID ANSWER: YES
FASTING STATUS PATIENT QL REPORTED: YES
GFR SERPLBLD BASED ON 1.73 SQ M-ARVRAT: 61 ML/MIN/1.73M2 (ref 60–?)
GLOBULIN PLAS-MCNC: 3.7 G/DL (ref 2.8–4.4)
GLUCOSE BLD-MCNC: 131 MG/DL (ref 70–99)
HCT VFR BLD AUTO: 50.9 %
HDLC SERPL-MCNC: 35 MG/DL (ref 40–59)
HGB BLD-MCNC: 17.3 G/DL
IMM GRANULOCYTES # BLD AUTO: 0.03 X10(3) UL (ref 0–1)
IMM GRANULOCYTES NFR BLD: 0.4 %
LDLC SERPL CALC-MCNC: 135 MG/DL (ref ?–100)
LYMPHOCYTES # BLD AUTO: 2.51 X10(3) UL (ref 1–4)
LYMPHOCYTES NFR BLD AUTO: 37.3 %
MCH RBC QN AUTO: 30.6 PG (ref 26–34)
MCHC RBC AUTO-ENTMCNC: 34 G/DL (ref 31–37)
MCV RBC AUTO: 90.1 FL
MONOCYTES # BLD AUTO: 0.71 X10(3) UL (ref 0.1–1)
MONOCYTES NFR BLD AUTO: 10.5 %
NEUTROPHILS # BLD AUTO: 3.35 X10 (3) UL (ref 1.5–7.7)
NEUTROPHILS # BLD AUTO: 3.35 X10(3) UL (ref 1.5–7.7)
NEUTROPHILS NFR BLD AUTO: 49.9 %
NONHDLC SERPL-MCNC: 177 MG/DL (ref ?–130)
OSMOLALITY SERPL CALC.SUM OF ELEC: 292 MOSM/KG (ref 275–295)
PLATELET # BLD AUTO: 216 10(3)UL (ref 150–450)
POTASSIUM SERPL-SCNC: 4.1 MMOL/L (ref 3.5–5.1)
PROT SERPL-MCNC: 7.6 G/DL (ref 6.4–8.2)
RBC # BLD AUTO: 5.65 X10(6)UL
SODIUM SERPL-SCNC: 139 MMOL/L (ref 136–145)
TRIGL SERPL-MCNC: 234 MG/DL (ref 30–149)
TSI SER-ACNC: 2.42 MIU/ML (ref 0.36–3.74)
VLDLC SERPL CALC-MCNC: 43 MG/DL (ref 0–30)
WBC # BLD AUTO: 6.7 X10(3) UL (ref 4–11)

## 2022-10-05 PROCEDURE — 85025 COMPLETE CBC W/AUTO DIFF WBC: CPT

## 2022-10-05 PROCEDURE — 87081 CULTURE SCREEN ONLY: CPT

## 2022-10-05 PROCEDURE — 84443 ASSAY THYROID STIM HORMONE: CPT

## 2022-10-05 PROCEDURE — 80061 LIPID PANEL: CPT

## 2022-10-05 PROCEDURE — 36415 COLL VENOUS BLD VENIPUNCTURE: CPT

## 2022-10-05 PROCEDURE — 80053 COMPREHEN METABOLIC PANEL: CPT

## 2022-10-06 ENCOUNTER — LAB ENCOUNTER (OUTPATIENT)
Dept: LAB | Facility: HOSPITAL | Age: 47
End: 2022-10-06
Attending: INTERNAL MEDICINE
Payer: COMMERCIAL

## 2022-10-06 DIAGNOSIS — Z86.14 HISTORY OF MRSA INFECTION: ICD-10-CM

## 2022-10-06 PROCEDURE — 87081 CULTURE SCREEN ONLY: CPT

## 2022-10-07 ENCOUNTER — LAB ENCOUNTER (OUTPATIENT)
Dept: LAB | Facility: HOSPITAL | Age: 47
End: 2022-10-07
Attending: INTERNAL MEDICINE
Payer: COMMERCIAL

## 2022-10-07 DIAGNOSIS — Z86.14 HISTORY OF MRSA INFECTION: ICD-10-CM

## 2022-10-07 PROCEDURE — 87081 CULTURE SCREEN ONLY: CPT

## 2022-11-30 ENCOUNTER — HOSPITAL ENCOUNTER (OUTPATIENT)
Dept: LAB | Facility: HOSPITAL | Age: 47
Discharge: HOME | End: 2022-11-30
Attending: INTERNAL MEDICINE
Payer: COMMERCIAL

## 2022-11-30 LAB
CHOLEST SERPL-MCNC: 164 MG/DL (ref ?–200)
FASTING PATIENT LIPID ANSWER: YES
HDLC SERPL-MCNC: 41 MG/DL (ref 40–59)
LDLC SERPL CALC-MCNC: 101 MG/DL (ref ?–100)
NONHDLC SERPL-MCNC: 123 MG/DL (ref ?–130)
TRIGL SERPL-MCNC: 124 MG/DL (ref 30–149)
VLDLC SERPL CALC-MCNC: 21 MG/DL (ref 0–30)

## 2022-11-30 PROCEDURE — 36415 COLL VENOUS BLD VENIPUNCTURE: CPT | Performed by: INTERNAL MEDICINE

## 2022-11-30 PROCEDURE — 80061 LIPID PANEL: CPT | Performed by: INTERNAL MEDICINE

## 2022-12-06 ENCOUNTER — ORDER TRANSCRIPTION (OUTPATIENT)
Dept: ADMINISTRATIVE | Facility: HOSPITAL | Age: 47
End: 2022-12-06

## 2022-12-06 DIAGNOSIS — Z01.810 PREOP CARDIOVASCULAR EXAM: Primary | ICD-10-CM

## 2022-12-06 DIAGNOSIS — Z51.81 THERAPEUTIC DRUG MONITORING: ICD-10-CM

## 2022-12-06 DIAGNOSIS — Z00.00 WELLNESS EXAMINATION: ICD-10-CM

## 2022-12-13 ENCOUNTER — LAB ENCOUNTER (OUTPATIENT)
Dept: LAB | Facility: HOSPITAL | Age: 47
End: 2022-12-13
Attending: INTERNAL MEDICINE
Payer: COMMERCIAL

## 2022-12-13 ENCOUNTER — HOSPITAL ENCOUNTER (OUTPATIENT)
Dept: GENERAL RADIOLOGY | Facility: HOSPITAL | Age: 47
Discharge: HOME | End: 2022-12-13
Attending: INTERNAL MEDICINE
Payer: COMMERCIAL

## 2022-12-13 DIAGNOSIS — Z00.00 WELLNESS EXAMINATION: ICD-10-CM

## 2022-12-13 DIAGNOSIS — Z01.810 PREOP CARDIOVASCULAR EXAM: ICD-10-CM

## 2022-12-13 DIAGNOSIS — Z51.81 THERAPEUTIC DRUG MONITORING: ICD-10-CM

## 2022-12-13 PROCEDURE — 71046 X-RAY EXAM CHEST 2 VIEWS: CPT | Performed by: INTERNAL MEDICINE

## 2022-12-14 LAB — SARS-COV-2 RNA RESP QL NAA+PROBE: NOT DETECTED

## 2022-12-22 VITALS — BODY MASS INDEX: 33.4 KG/M2 | WEIGHT: 252 LBS | HEIGHT: 73 IN

## 2023-01-04 VITALS — BODY MASS INDEX: 35.84 KG/M2 | WEIGHT: 256 LBS | HEIGHT: 71 IN

## 2023-01-06 ENCOUNTER — HOSPITAL ENCOUNTER (OUTPATIENT)
Dept: INTERVENTIONAL RADIOLOGY/VASCULAR | Facility: HOSPITAL | Age: 48
Discharge: HOME | End: 2023-01-06
Attending: INTERNAL MEDICINE
Payer: COMMERCIAL

## 2023-01-13 ENCOUNTER — HOSPITAL ENCOUNTER (OUTPATIENT)
Dept: INTERVENTIONAL RADIOLOGY/VASCULAR | Facility: HOSPITAL | Age: 48
Discharge: HOME | End: 2023-01-13
Attending: INTERNAL MEDICINE
Payer: COMMERCIAL

## 2023-01-24 ENCOUNTER — LAB ENCOUNTER (OUTPATIENT)
Dept: LAB | Facility: HOSPITAL | Age: 48
End: 2023-01-24
Attending: INTERNAL MEDICINE
Payer: COMMERCIAL

## 2023-01-24 DIAGNOSIS — I25.10 CAD (CORONARY ARTERY DISEASE): ICD-10-CM

## 2023-01-24 LAB
ANION GAP SERPL CALC-SCNC: 4 MMOL/L (ref 0–18)
ATRIAL RATE: 68 BPM
ATRIAL RATE: 70 BPM
BASOPHILS # BLD AUTO: 0.05 X10(3) UL (ref 0–0.2)
BASOPHILS NFR BLD AUTO: 0.6 %
BUN BLD-MCNC: 18 MG/DL (ref 7–18)
CALCIUM BLD-MCNC: 9.8 MG/DL (ref 8.5–10.1)
CHLORIDE SERPL-SCNC: 105 MMOL/L (ref 98–112)
CO2 SERPL-SCNC: 32 MMOL/L (ref 21–32)
CREAT BLD-MCNC: 1.18 MG/DL
EOSINOPHIL # BLD AUTO: 0.05 X10(3) UL (ref 0–0.7)
EOSINOPHIL NFR BLD AUTO: 0.6 %
ERYTHROCYTE [DISTWIDTH] IN BLOOD BY AUTOMATED COUNT: 11.9 %
GFR SERPLBLD BASED ON 1.73 SQ M-ARVRAT: 76 ML/MIN/1.73M2 (ref 60–?)
GLUCOSE BLD-MCNC: 104 MG/DL (ref 70–99)
HCT VFR BLD AUTO: 49 %
HGB BLD-MCNC: 17.2 G/DL
IMM GRANULOCYTES # BLD AUTO: 0.02 X10(3) UL (ref 0–1)
IMM GRANULOCYTES NFR BLD: 0.2 %
LYMPHOCYTES # BLD AUTO: 2.55 X10(3) UL (ref 1–4)
LYMPHOCYTES NFR BLD AUTO: 31.3 %
MCH RBC QN AUTO: 31.9 PG (ref 26–34)
MCHC RBC AUTO-ENTMCNC: 35.1 G/DL (ref 31–37)
MCV RBC AUTO: 90.9 FL
MONOCYTES # BLD AUTO: 0.75 X10(3) UL (ref 0.1–1)
MONOCYTES NFR BLD AUTO: 9.2 %
NEUTROPHILS # BLD AUTO: 4.72 X10 (3) UL (ref 1.5–7.7)
NEUTROPHILS # BLD AUTO: 4.72 X10(3) UL (ref 1.5–7.7)
NEUTROPHILS NFR BLD AUTO: 58.1 %
OSMOLALITY SERPL CALC.SUM OF ELEC: 294 MOSM/KG (ref 275–295)
P AXIS: 52 DEGREES
P AXIS: 56 DEGREES
P-R INTERVAL: 150 MS
P-R INTERVAL: 152 MS
PLATELET # BLD AUTO: 217 10(3)UL (ref 150–450)
POTASSIUM SERPL-SCNC: 3.7 MMOL/L (ref 3.5–5.1)
Q-T INTERVAL: 358 MS
Q-T INTERVAL: 394 MS
QRS DURATION: 92 MS
QRS DURATION: 92 MS
QTC CALCULATION (BEZET): 380 MS
QTC CALCULATION (BEZET): 425 MS
R AXIS: -11 DEGREES
R AXIS: -14 DEGREES
RBC # BLD AUTO: 5.39 X10(6)UL
SODIUM SERPL-SCNC: 141 MMOL/L (ref 136–145)
T AXIS: 77 DEGREES
T AXIS: 79 DEGREES
VENTRICULAR RATE: 68 BPM
VENTRICULAR RATE: 70 BPM
WBC # BLD AUTO: 8.1 X10(3) UL (ref 4–11)

## 2023-01-24 PROCEDURE — 85025 COMPLETE CBC W/AUTO DIFF WBC: CPT

## 2023-01-24 PROCEDURE — 93005 ELECTROCARDIOGRAM TRACING: CPT

## 2023-01-24 PROCEDURE — 80048 BASIC METABOLIC PNL TOTAL CA: CPT

## 2023-01-24 PROCEDURE — 93010 ELECTROCARDIOGRAM REPORT: CPT | Performed by: INTERNAL MEDICINE

## 2023-01-24 PROCEDURE — 36415 COLL VENOUS BLD VENIPUNCTURE: CPT

## 2023-01-25 LAB — SARS-COV-2 RNA RESP QL NAA+PROBE: NOT DETECTED

## 2023-01-27 ENCOUNTER — HOSPITAL ENCOUNTER (OUTPATIENT)
Dept: INTERVENTIONAL RADIOLOGY/VASCULAR | Facility: HOSPITAL | Age: 48
Discharge: HOME | End: 2023-01-27
Attending: INTERNAL MEDICINE | Admitting: INTERNAL MEDICINE
Payer: COMMERCIAL

## 2023-01-27 VITALS
DIASTOLIC BLOOD PRESSURE: 85 MMHG | RESPIRATION RATE: 19 BRPM | TEMPERATURE: 97 F | HEART RATE: 67 BPM | HEIGHT: 71 IN | OXYGEN SATURATION: 97 % | SYSTOLIC BLOOD PRESSURE: 123 MMHG | BODY MASS INDEX: 35.84 KG/M2 | WEIGHT: 256 LBS

## 2023-01-27 DIAGNOSIS — R94.39 ABNORMAL STRESS TEST: ICD-10-CM

## 2023-01-27 DIAGNOSIS — I25.10 CAD (CORONARY ARTERY DISEASE): Primary | ICD-10-CM

## 2023-01-27 PROCEDURE — 93458 L HRT ARTERY/VENTRICLE ANGIO: CPT | Performed by: INTERNAL MEDICINE

## 2023-01-27 PROCEDURE — 99152 MOD SED SAME PHYS/QHP 5/>YRS: CPT | Performed by: INTERNAL MEDICINE

## 2023-01-27 PROCEDURE — B2111ZZ FLUOROSCOPY OF MULTIPLE CORONARY ARTERIES USING LOW OSMOLAR CONTRAST: ICD-10-PCS | Performed by: INTERNAL MEDICINE

## 2023-01-27 PROCEDURE — B2151ZZ FLUOROSCOPY OF LEFT HEART USING LOW OSMOLAR CONTRAST: ICD-10-PCS | Performed by: INTERNAL MEDICINE

## 2023-01-27 RX ORDER — VERAPAMIL HYDROCHLORIDE 2.5 MG/ML
INJECTION, SOLUTION INTRAVENOUS
Status: COMPLETED
Start: 2023-01-27 | End: 2023-01-27

## 2023-01-27 RX ORDER — ROSUVASTATIN CALCIUM 40 MG/1
40 TABLET, COATED ORAL NIGHTLY
Qty: 30 TABLET | Refills: 5 | Status: SHIPPED | OUTPATIENT
Start: 2023-01-27 | End: 2023-01-27

## 2023-01-27 RX ORDER — ROSUVASTATIN CALCIUM 40 MG/1
40 TABLET, COATED ORAL NIGHTLY
Qty: 30 TABLET | Refills: 5 | Status: SHIPPED | OUTPATIENT
Start: 2023-01-27

## 2023-01-27 RX ORDER — GARLIC EXTRACT 500 MG
1 CAPSULE ORAL DAILY
COMMUNITY

## 2023-01-27 RX ORDER — CETIRIZINE HYDROCHLORIDE 10 MG/1
10 TABLET ORAL DAILY
COMMUNITY

## 2023-01-27 RX ORDER — HEPARIN SODIUM 5000 [USP'U]/ML
INJECTION, SOLUTION INTRAVENOUS; SUBCUTANEOUS
Status: COMPLETED
Start: 2023-01-27 | End: 2023-01-27

## 2023-01-27 RX ORDER — ASPIRIN 81 MG/1
81 TABLET ORAL DAILY
Status: DISCONTINUED | OUTPATIENT
Start: 2023-01-27 | End: 2023-01-27

## 2023-01-27 RX ORDER — LIDOCAINE HYDROCHLORIDE 10 MG/ML
INJECTION, SOLUTION EPIDURAL; INFILTRATION; INTRACAUDAL; PERINEURAL
Status: COMPLETED
Start: 2023-01-27 | End: 2023-01-27

## 2023-01-27 RX ORDER — SODIUM CHLORIDE 9 MG/ML
INJECTION, SOLUTION INTRAVENOUS
Status: COMPLETED | OUTPATIENT
Start: 2023-01-27 | End: 2023-01-27

## 2023-01-27 RX ORDER — ROSUVASTATIN CALCIUM 40 MG/1
40 TABLET, COATED ORAL NIGHTLY
Status: DISCONTINUED | OUTPATIENT
Start: 2023-01-27 | End: 2023-01-27

## 2023-01-27 RX ORDER — MIDAZOLAM HYDROCHLORIDE 1 MG/ML
INJECTION INTRAMUSCULAR; INTRAVENOUS
Status: COMPLETED
Start: 2023-01-27 | End: 2023-01-27

## 2023-01-27 RX ADMIN — SODIUM CHLORIDE: 9 INJECTION, SOLUTION INTRAVENOUS at 08:42:00

## 2023-01-27 NOTE — PROCEDURES
[address]    Cardiac Catheterization Note    Primary Proceduralist: Perla Caceres MD  Procedure Performed: [facility]  Date of Procedure: 1/27/2023   Indication: CAD, angina  Estimated blood loss: 10cc  Specimens: None    Consent obtained from the patient and documented in the paper chart, unless verbally obtained in an emergency setting. The benefits and risk of the procedure, including but not limited to infection, bleeding, myocardial infarction, stroke, vascular injury, emergency surgery, renal failure requiring dialysis and death were discussed with the patient. These complications occur in approximately 1-2% of elective procedures, but the risk may be significantly elevated in the setting of acute coronary syndrome, electrical or hemodynamic instability, multivessel disease, reduced LVEF or . The patient consented to any additional procedures performed emergently in order to address a complication or prevent medical deterioration. Viable alternatives were explained to the patient, including continuing medical therapy, with the risks incurred along that course. Procedure/Technique:    Lidocaine 1% was administered locally. Access of right radial artery obtained using Seldinger technique. Ultrasound was not used. 6 Fr Sheath was inserted. J-wire advanced into the aorta under fluoroscopy. Left coronary system engaged using 6 Fr JL4 diagnostic cath. Selective angiogram performed. Right coronary system engaged using 6 Fr JR4 diagnostic cath. Selective angiogram performed. 6 Fr pigtail Catheter advanced into the LV. Hemodynamics were obtained. Coronary Angiogram Findings:  1. LM: Large caliber artery giving rise to LAD & LCX. No significant stenosis. 2. LAD: Large caliber artery giving rise to diagonal and septal branches. 80% mid LAD and 80% proximal diagonal artery stenosis. 3. LCX: Medium to large caliber artery giving rise to OM branches.  90% mid LCX stenosis. 4. RCA: Large caliber artery giving rise to acute marginal branches, and bifurcates into RPDA & RPL. 80% proximal RCA & 99% mid RCA stenosis. Diffuse disease involving RPDA. Right doninant. Hemodynamics:  /4, LVEDP 14-16  /69, mean 85  No significant gradient upon Ao-LV pullback  LVEF 60-65%    Intervention:  nONE    Monitored sedation administered by the cath lab RN, and supervised throughout the procedure by myself. Total time 13 minutes. Closure: Radial band. No immediate complications. A/P:  1. MV-CAD including 80% mid LAD, 80% proximal diagonal. 90% mid LCX, 80% proximal RCA, 99% mid RCA stenosis. 2. Start ASA, rosuvastatin  3. CTS outpatient consult. Will require evaluation for CABG. 4. Radial band. DC later today.       Eva Davis MD  Interventional Cardiology  [address] [place] [address]

## 2023-01-27 NOTE — PROGRESS NOTES
Pt received s/p C with Dr. Khushi Webber. Right radial arterial access site closed with TR band with air in cuff. Site CDI, no bleeding or hematoma present. Dr. Khushi Webber at bedside and spoke with pt's wife. Rhoda AGARWAL for Dr. Rina Montanez at bedside and spoke with pt and pt's wife. TR band removed per protocol, site remains unchanged. 4x4 and foam tape dressing applied over site. Recovery completed. Discharge instructions given to pt and pt's wife. IV discontinued, pt taken down to [facility] via wheelchair for discharge. Pt's wife driving pt home.

## 2023-02-13 ENCOUNTER — HOSPITAL ENCOUNTER (OUTPATIENT)
Dept: INTERVENTIONAL RADIOLOGY/VASCULAR | Facility: HOSPITAL | Age: 48
Discharge: HOME | End: 2023-02-13
Attending: THORACIC SURGERY (CARDIOTHORACIC VASCULAR SURGERY)
Payer: COMMERCIAL

## 2023-02-13 ENCOUNTER — HOSPITAL ENCOUNTER (OUTPATIENT)
Dept: GENERAL RADIOLOGY | Facility: HOSPITAL | Age: 48
Discharge: HOME | End: 2023-02-13
Attending: THORACIC SURGERY (CARDIOTHORACIC VASCULAR SURGERY)
Payer: COMMERCIAL

## 2023-02-13 ENCOUNTER — HOSPITAL ENCOUNTER (OUTPATIENT)
Dept: LAB | Facility: HOSPITAL | Age: 48
Discharge: HOME | End: 2023-02-13
Attending: THORACIC SURGERY (CARDIOTHORACIC VASCULAR SURGERY)
Payer: COMMERCIAL

## 2023-02-13 ENCOUNTER — HOSPITAL ENCOUNTER (OUTPATIENT)
Dept: CARDIOLOGY CLINIC | Facility: HOSPITAL | Age: 48
Discharge: HOME | End: 2023-02-13
Attending: THORACIC SURGERY (CARDIOTHORACIC VASCULAR SURGERY)
Payer: COMMERCIAL

## 2023-02-13 DIAGNOSIS — Z01.818 PRE-OP TESTING: ICD-10-CM

## 2023-02-13 DIAGNOSIS — I25.10 CORONARY ARTERY DISEASE WITHOUT ANGINA PECTORIS, UNSPECIFIED VESSEL OR LESION TYPE, UNSPECIFIED WHETHER NATIVE OR TRANSPLANTED HEART: ICD-10-CM

## 2023-02-13 DIAGNOSIS — Z91.89 AT RISK FOR BLEEDING: ICD-10-CM

## 2023-02-13 LAB
ALBUMIN SERPL-MCNC: 3.9 G/DL (ref 3.4–5)
ALP LIVER SERPL-CCNC: 69 U/L
ALT SERPL-CCNC: 44 U/L
ANTIBODY SCREEN: NEGATIVE
APTT PPP: 26.6 SECONDS (ref 23.3–35.6)
AST SERPL-CCNC: 26 U/L (ref 15–37)
BILIRUB DIRECT SERPL-MCNC: 0.1 MG/DL (ref 0–0.2)
BILIRUB SERPL-MCNC: 0.6 MG/DL (ref 0.1–2)
BILIRUB UR QL STRIP.AUTO: NEGATIVE
CLARITY UR REFRACT.AUTO: CLEAR
COLOR UR AUTO: YELLOW
EST. AVERAGE GLUCOSE BLD GHB EST-MCNC: 146 MG/DL (ref 68–126)
GLUCOSE UR STRIP.AUTO-MCNC: NEGATIVE MG/DL
HBA1C MFR BLD: 6.7 % (ref ?–5.7)
INR BLD: 0.96 (ref 0.85–1.16)
KETONES UR STRIP.AUTO-MCNC: NEGATIVE MG/DL
LEUKOCYTE ESTERASE UR QL STRIP.AUTO: NEGATIVE
NITRITE UR QL STRIP.AUTO: NEGATIVE
PH UR STRIP.AUTO: 5 [PH] (ref 5–8)
PROT SERPL-MCNC: 7.4 G/DL (ref 6.4–8.2)
PROT UR STRIP.AUTO-MCNC: NEGATIVE MG/DL
PROTHROMBIN TIME: 12.8 SECONDS (ref 11.6–14.8)
RBC UR QL AUTO: NEGATIVE
RH BLOOD TYPE: POSITIVE
SP GR UR STRIP.AUTO: 1.02 (ref 1–1.03)
UROBILINOGEN UR STRIP.AUTO-MCNC: <2 MG/DL

## 2023-02-13 PROCEDURE — 80076 HEPATIC FUNCTION PANEL: CPT | Performed by: THORACIC SURGERY (CARDIOTHORACIC VASCULAR SURGERY)

## 2023-02-13 PROCEDURE — 86901 BLOOD TYPING SEROLOGIC RH(D): CPT | Performed by: THORACIC SURGERY (CARDIOTHORACIC VASCULAR SURGERY)

## 2023-02-13 PROCEDURE — 71045 X-RAY EXAM CHEST 1 VIEW: CPT | Performed by: THORACIC SURGERY (CARDIOTHORACIC VASCULAR SURGERY)

## 2023-02-13 PROCEDURE — 86850 RBC ANTIBODY SCREEN: CPT | Performed by: THORACIC SURGERY (CARDIOTHORACIC VASCULAR SURGERY)

## 2023-02-13 PROCEDURE — 86900 BLOOD TYPING SEROLOGIC ABO: CPT | Performed by: THORACIC SURGERY (CARDIOTHORACIC VASCULAR SURGERY)

## 2023-02-13 PROCEDURE — 36415 COLL VENOUS BLD VENIPUNCTURE: CPT | Performed by: THORACIC SURGERY (CARDIOTHORACIC VASCULAR SURGERY)

## 2023-02-13 PROCEDURE — 3044F HG A1C LEVEL LT 7.0%: CPT | Performed by: FAMILY MEDICINE

## 2023-02-13 PROCEDURE — 85730 THROMBOPLASTIN TIME PARTIAL: CPT | Performed by: THORACIC SURGERY (CARDIOTHORACIC VASCULAR SURGERY)

## 2023-02-13 PROCEDURE — 83036 HEMOGLOBIN GLYCOSYLATED A1C: CPT | Performed by: THORACIC SURGERY (CARDIOTHORACIC VASCULAR SURGERY)

## 2023-02-13 PROCEDURE — 81003 URINALYSIS AUTO W/O SCOPE: CPT | Performed by: THORACIC SURGERY (CARDIOTHORACIC VASCULAR SURGERY)

## 2023-02-13 PROCEDURE — 85610 PROTHROMBIN TIME: CPT | Performed by: THORACIC SURGERY (CARDIOTHORACIC VASCULAR SURGERY)

## 2023-02-13 NOTE — PAT NURSING NOTE
Pre-op visit done in person. Walk: 4.2;4.5;4.39. Reviewed all instructions. TOA 0530. All labs, PAT's done. Instructed to bring CPAP mask/tubing. NPO after 2200. NO meds am of. LD of asa 2/17. LD of OTC's 2/15.  Covid tested scheduled for 2/20

## 2023-02-13 NOTE — CM/SW NOTE
02/13/23 1400   CM/SW Referral Data   Referral Source Social Work (self-referral)   Reason for Referral Discharge planning   Informant Patient   Pertinent Medical Hx   Does patient have an established PCP? Yes   Patient Info   Patient's Current Mental Status at Time of Assessment Alert;Oriented   Patient's Home Environment [facility]   Number of Levels in Home 2   Number of Stair in Home 15   Patient lives with Alone   Patient Status Prior to Admission   Independent with ADLs and Mobility Yes   Services in place prior to admission DME/Supplies at home   Type of DME/Supplies CPAP   Discharge Needs   Anticipated D/C needs Home health care     SW met with pt and family member, Michael Galindo, during PAT to complete discharge planning assessments. Pt is due to have CABG on 2/22 w/ Dr Filippo Hernandez. The pt is a 49 y/o male who presents as alert and oriented. Pt reports that he lives alone in [place] in a 2 level [facility] w/ 15 stairs. 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 Pt states that he does not do any heavy lifting. Pt states that his PCP is Dr Sherrill Tracy and pt gets his medications at [place] on [address]. Pt states that his parents are coming in from [place] [address] to stay with pt for a few weeks after surgery. Pt confirms that he has a CPAP machine at home. SW discussed that [place] [id number] will be arranged for pt after surgery and that the [facility] provider [facility]. Pt in agreement. SW notified [facility] liaison of the new referral. All questions addressed with pt and family member. SW provided pt with  contact information if any questions arise.  &  to remain available and supportive for discharge planning needs.     ANDRIA Nunez, [facility]  Discharge [address] [facility].

## 2023-02-13 NOTE — PROGRESS NOTES
Met with patient and wife in PAT to discuss pre op teaching, post op expectations, discharge planning. Discussed roles of CM/SW, PT/OT, Cardiac rehab in education and recovery. All questions answered, binder given. Pt works for the [employer], aware of lifting and driving restrictions, plans to take as much time off as needed, can do lite duty. Pts parents will be home to assist at discharge when wife cannot be there. Will follow up post op.   Jay Jay Dennison RN  Clinical Coordinator  CV Surgery

## 2023-02-20 ENCOUNTER — LAB ENCOUNTER (OUTPATIENT)
Dept: LAB | Facility: HOSPITAL | Age: 48
End: 2023-02-20
Attending: THORACIC SURGERY (CARDIOTHORACIC VASCULAR SURGERY)
Payer: COMMERCIAL

## 2023-02-20 DIAGNOSIS — I25.10 CORONARY ARTERY DISEASE WITHOUT ANGINA PECTORIS, UNSPECIFIED VESSEL OR LESION TYPE, UNSPECIFIED WHETHER NATIVE OR TRANSPLANTED HEART: ICD-10-CM

## 2023-02-20 DIAGNOSIS — Z01.818 PRE-OP TESTING: ICD-10-CM

## 2023-02-20 DIAGNOSIS — Z91.89 AT RISK FOR BLEEDING: ICD-10-CM

## 2023-02-20 LAB — SARS-COV-2 RNA RESP QL NAA+PROBE: NOT DETECTED

## 2023-02-22 ENCOUNTER — ANESTHESIA EVENT (OUTPATIENT)
Dept: CARDIAC SURGERY | Facility: HOSPITAL | Age: 48
End: 2023-02-22
Payer: COMMERCIAL

## 2023-02-22 NOTE — PROGRESS NOTES
Informed by TYLOR Miller pt forgot to stop ASA 81mg. Dr. Jose Rafael Hurley aware, ok to proceed with surgery.   Allegra Romero RN  Clinical Coordinator  CV Surgery

## 2023-02-22 NOTE — PAT NURSING NOTE
PAT nursing note: patient is scheduled for CABG tomorrow with Dr. Hardik Almonte. Pt stated his last dose of ASA was yesterday, 2/21. He stated \"couldn't remember when to stop. \" He did stop vitamins, supplements, NSAIDs as instructed. Notified Jim Gonzalez RN. Will wait for reply.

## 2023-02-23 ENCOUNTER — ANESTHESIA (OUTPATIENT)
Dept: CARDIAC SURGERY | Facility: HOSPITAL | Age: 48
End: 2023-02-23
Payer: COMMERCIAL

## 2023-02-23 ENCOUNTER — APPOINTMENT (OUTPATIENT)
Dept: GENERAL RADIOLOGY | Facility: HOSPITAL | Age: 48
DRG: 236 | End: 2023-02-23
Attending: PHYSICIAN ASSISTANT
Payer: COMMERCIAL

## 2023-02-23 ENCOUNTER — HOSPITAL ENCOUNTER (INPATIENT)
Facility: HOSPITAL | Age: 48
LOS: 4 days | Discharge: HOME | DRG: 236 | End: 2023-02-27
Attending: THORACIC SURGERY (CARDIOTHORACIC VASCULAR SURGERY) | Admitting: THORACIC SURGERY (CARDIOTHORACIC VASCULAR SURGERY)
Payer: COMMERCIAL

## 2023-02-23 DIAGNOSIS — Z01.818 PRE-OP TESTING: ICD-10-CM

## 2023-02-23 DIAGNOSIS — I25.10 CORONARY ARTERY DISEASE WITHOUT ANGINA PECTORIS, UNSPECIFIED VESSEL OR LESION TYPE, UNSPECIFIED WHETHER NATIVE OR TRANSPLANTED HEART: ICD-10-CM

## 2023-02-23 DIAGNOSIS — Z91.89 AT RISK FOR BLEEDING: ICD-10-CM

## 2023-02-23 DIAGNOSIS — J90 PLEURAL EFFUSION: Primary | ICD-10-CM

## 2023-02-23 LAB
APTT PPP: 28.8 SECONDS (ref 23.3–35.6)
ATRIAL RATE: 81 BPM
BASE EXCESS BLD CALC-SCNC: -2 MMOL/L
BASE EXCESS BLD CALC-SCNC: -5 MMOL/L
BASE EXCESS BLD CALC-SCNC: 1 MMOL/L
BASE EXCESS BLDA CALC-SCNC: -2.3 MMOL/L (ref ?–2)
BASE EXCESS BLDMV CALC-SCNC: -2 MMOL/L (ref ?–30)
BASE EXCESS BLDMV CALC-SCNC: -3 MMOL/L (ref ?–30)
BODY TEMPERATURE: 98.6 F
BUN BLD-MCNC: 15 MG/DL (ref 7–18)
CA-I BLD-SCNC: 1.11 MMOL/L (ref 1.12–1.32)
CA-I BLD-SCNC: 1.2 MMOL/L (ref 0.95–1.32)
CA-I BLD-SCNC: 1.21 MMOL/L (ref 1.12–1.32)
CA-I BLD-SCNC: 1.26 MMOL/L (ref 1.12–1.32)
CA-I BLDMV-SCNC: 1.13 MMOL/L (ref 1.12–1.32)
CA-I BLDMV-SCNC: 1.15 MMOL/L (ref 1.12–1.32)
CALCIUM BLD-MCNC: 8.6 MG/DL (ref 8.5–10.1)
CHLORIDE SERPL-SCNC: 113 MMOL/L (ref 98–112)
CO2 BLD-SCNC: 21 MMOL/L (ref 22–32)
CO2 BLD-SCNC: 25 MMOL/L (ref 22–32)
CO2 BLD-SCNC: 26 MMOL/L (ref 22–32)
CO2 BLDMV-SCNC: 24 MMOL/L (ref 22–32)
CO2 BLDMV-SCNC: 25 MMOL/L (ref 22–32)
CO2 SERPL-SCNC: 23 MMOL/L (ref 21–32)
COHGB MFR BLD: 2 % SAT (ref 0–3)
CREAT BLD-MCNC: 1.2 MG/DL
ERYTHROCYTE [DISTWIDTH] IN BLOOD BY AUTOMATED COUNT: 12.1 %
FIBRINOGEN PPP-MCNC: 275 MG/DL (ref 180–480)
FIO2: 45 %
GFR SERPLBLD BASED ON 1.73 SQ M-ARVRAT: 74 ML/MIN/1.73M2 (ref 60–?)
GLUCOSE BLD-MCNC: 102 MG/DL (ref 70–99)
GLUCOSE BLD-MCNC: 122 MG/DL (ref 70–99)
GLUCOSE BLD-MCNC: 131 MG/DL (ref 70–99)
GLUCOSE BLD-MCNC: 135 MG/DL (ref 70–99)
GLUCOSE BLD-MCNC: 136 MG/DL (ref 70–99)
GLUCOSE BLD-MCNC: 144 MG/DL (ref 70–99)
GLUCOSE BLD-MCNC: 156 MG/DL (ref 70–99)
GLUCOSE BLD-MCNC: 160 MG/DL (ref 70–99)
GLUCOSE BLD-MCNC: 161 MG/DL (ref 70–99)
GLUCOSE BLD-MCNC: 163 MG/DL (ref 70–99)
GLUCOSE BLD-MCNC: 172 MG/DL (ref 70–99)
GLUCOSE BLD-MCNC: 173 MG/DL (ref 70–99)
GLUCOSE BLD-MCNC: 177 MG/DL (ref 70–99)
GLUCOSE BLD-MCNC: 183 MG/DL (ref 70–99)
GLUCOSE BLD-MCNC: 185 MG/DL (ref 70–99)
GLUCOSE BLD-MCNC: 185 MG/DL (ref 70–99)
GLUCOSE BLD-MCNC: 194 MG/DL (ref 70–99)
HCO3 BLD-SCNC: 20.3 MEQ/L
HCO3 BLD-SCNC: 23.4 MEQ/L
HCO3 BLD-SCNC: 25.2 MEQ/L
HCO3 BLDA-SCNC: 23.1 MEQ/L (ref 21–27)
HCO3 BLDMV-SCNC: 22.8 MEQ/L (ref 22–26)
HCO3 BLDMV-SCNC: 24 MEQ/L (ref 22–26)
HCT VFR BLD AUTO: 42.3 %
HCT VFR BLD CALC: 32 %
HCT VFR BLD CALC: 36 %
HCT VFR BLD CALC: 39 %
HCT VFR BLDMV CALC: 33 %
HCT VFR BLDMV CALC: 33 %
HGB BLD-MCNC: 14.8 G/DL
HGB BLD-MCNC: 15.8 G/DL
INR BLD: 1.32 (ref 0.85–1.16)
ISTAT ACTIVATED CLOTTING TIME: 131 SECONDS (ref 74–137)
ISTAT ACTIVATED CLOTTING TIME: 143 SECONDS (ref 74–137)
ISTAT ACTIVATED CLOTTING TIME: 480 SECONDS (ref 74–137)
ISTAT ACTIVATED CLOTTING TIME: 612 SECONDS (ref 74–137)
ISTAT ACTIVATED CLOTTING TIME: 630 SECONDS (ref 74–137)
ISTAT ACTIVATED CLOTTING TIME: 666 SECONDS (ref 74–137)
ISTAT PATIENT TEMPERATURE: 32 DEGREE
ISTAT PATIENT TEMPERATURE: 32 DEGREE
LACTATE BLD-SCNC: 1.7 MMOL/L (ref 0.5–2)
MAGNESIUM SERPL-MCNC: 2.5 MG/DL (ref 1.6–2.6)
MCH RBC QN AUTO: 31.1 PG (ref 26–34)
MCHC RBC AUTO-ENTMCNC: 35 G/DL (ref 31–37)
MCV RBC AUTO: 88.9 FL
METHGB MFR BLD: 0.4 % SAT (ref 0.4–1.5)
OXYHGB MFR BLDA: 97.3 % (ref 92–100)
P AXIS: 51 DEGREES
P-R INTERVAL: 148 MS
PCO2 BLD: 34.1 MMHG
PCO2 BLD: 37.9 MMHG
PCO2 BLD: 40 MMHG
PCO2 BLDA: 38 MM HG (ref 35–45)
PCO2 BLDMV: 33 MMHG (ref 38–50)
PCO2 BLDMV: 35.3 MMHG (ref 38–50)
PEEP: 5 CM H2O
PH BLD: 7.38 [PH]
PH BLD: 7.38 [PH]
PH BLD: 7.43 [PH]
PH BLDA: 7.38 [PH] (ref 7.35–7.45)
PH BLDMV: 7.42 [PH] (ref 7.32–7.43)
PH BLDMV: 7.42 [PH] (ref 7.32–7.43)
PLATELET # BLD AUTO: 138 10(3)UL (ref 150–450)
PO2 BLD: 281 MMHG
PO2 BLD: 40 MMHG
PO2 BLD: 78 MMHG
PO2 BLDA: 105 MM HG (ref 80–100)
PO2 BLDMV: <70 MMHG (ref 35–40)
PO2 BLDMV: <70 MMHG (ref 35–40)
POTASSIUM BLD-SCNC: 3.3 MMOL/L (ref 3.6–5.1)
POTASSIUM BLD-SCNC: 3.8 MMOL/L (ref 3.6–5.1)
POTASSIUM BLD-SCNC: 4.3 MMOL/L (ref 3.6–5.1)
POTASSIUM BLD-SCNC: 6.9 MMOL/L (ref 3.6–5.1)
POTASSIUM SERPL-SCNC: 3.5 MMOL/L (ref 3.5–5.1)
PRESSURE SUPPORT: 5 CM H2O
PROTHROMBIN TIME: 16.4 SECONDS (ref 11.6–14.8)
Q-T INTERVAL: 420 MS
QRS DURATION: 94 MS
QTC CALCULATION (BEZET): 487 MS
R AXIS: -6 DEGREES
RBC # BLD AUTO: 4.76 X10(6)UL
SAO2 % BLD: 100 %
SAO2 % BLD: 77 %
SAO2 % BLD: 95 %
SAO2 % BLDMV: 82 % (ref 60–85)
SAO2 % BLDMV: 83 % (ref 60–85)
SODIUM BLD-SCNC: 136 MMOL/L (ref 136–145)
SODIUM BLD-SCNC: 137 MMOL/L (ref 135–145)
SODIUM BLD-SCNC: 142 MMOL/L (ref 136–145)
SODIUM BLD-SCNC: 143 MMOL/L (ref 136–145)
SODIUM BLDMV-SCNC: 132 MMOL/L (ref 136–145)
SODIUM BLDMV-SCNC: 134 MMOL/L (ref 136–145)
SODIUM BLDMV-SCNC: 6.2 MMOL/L (ref 3.6–5.1)
SODIUM BLDMV-SCNC: 7.1 MMOL/L (ref 3.6–5.1)
SODIUM SERPL-SCNC: 141 MMOL/L (ref 136–145)
T AXIS: -18 DEGREES
VENTRICULAR RATE: 81 BPM
WBC # BLD AUTO: 11.4 X10(3) UL (ref 4–11)

## 2023-02-23 PROCEDURE — 5A1221Z PERFORMANCE OF CARDIAC OUTPUT, CONTINUOUS: ICD-10-PCS | Performed by: THORACIC SURGERY (CARDIOTHORACIC VASCULAR SURGERY)

## 2023-02-23 PROCEDURE — 021109W BYPASS CORONARY ARTERY, TWO ARTERIES FROM AORTA WITH AUTOLOGOUS VENOUS TISSUE, OPEN APPROACH: ICD-10-PCS | Performed by: THORACIC SURGERY (CARDIOTHORACIC VASCULAR SURGERY)

## 2023-02-23 PROCEDURE — 06BQ4ZZ EXCISION OF LEFT SAPHENOUS VEIN, PERCUTANEOUS ENDOSCOPIC APPROACH: ICD-10-PCS | Performed by: THORACIC SURGERY (CARDIOTHORACIC VASCULAR SURGERY)

## 2023-02-23 PROCEDURE — B246ZZ4 ULTRASONOGRAPHY OF RIGHT AND LEFT HEART, TRANSESOPHAGEAL: ICD-10-PCS | Performed by: THORACIC SURGERY (CARDIOTHORACIC VASCULAR SURGERY)

## 2023-02-23 PROCEDURE — 5A09357 ASSISTANCE WITH RESPIRATORY VENTILATION, LESS THAN 24 CONSECUTIVE HOURS, CONTINUOUS POSITIVE AIRWAY PRESSURE: ICD-10-PCS | Performed by: THORACIC SURGERY (CARDIOTHORACIC VASCULAR SURGERY)

## 2023-02-23 PROCEDURE — 02100Z9 BYPASS CORONARY ARTERY, ONE ARTERY FROM LEFT INTERNAL MAMMARY, OPEN APPROACH: ICD-10-PCS | Performed by: THORACIC SURGERY (CARDIOTHORACIC VASCULAR SURGERY)

## 2023-02-23 PROCEDURE — 76942 ECHO GUIDE FOR BIOPSY: CPT | Performed by: ANESTHESIOLOGY

## 2023-02-23 PROCEDURE — 99252 IP/OBS CONSLTJ NEW/EST SF 35: CPT | Performed by: STUDENT IN AN ORGANIZED HEALTH CARE EDUCATION/TRAINING PROGRAM

## 2023-02-23 PROCEDURE — 71045 X-RAY EXAM CHEST 1 VIEW: CPT | Performed by: PHYSICIAN ASSISTANT

## 2023-02-23 PROCEDURE — 93312 ECHO TRANSESOPHAGEAL: CPT | Performed by: ANESTHESIOLOGY

## 2023-02-23 DEVICE — IMPLANTABLE DEVICE
Type: IMPLANTABLE DEVICE | Status: FUNCTIONAL
Brand: STERNALOCK® BLU SYSTEM

## 2023-02-23 RX ORDER — CHLORHEXIDINE GLUCONATE 0.12 MG/ML
15 RINSE ORAL
Status: DISCONTINUED | OUTPATIENT
Start: 2023-02-23 | End: 2023-02-23

## 2023-02-23 RX ORDER — NITROGLYCERIN 20 MG/100ML
INJECTION INTRAVENOUS CONTINUOUS PRN
Status: DISCONTINUED | OUTPATIENT
Start: 2023-02-23 | End: 2023-02-27

## 2023-02-23 RX ORDER — NICOTINE POLACRILEX 4 MG
15 LOZENGE BUCCAL
Status: DISCONTINUED | OUTPATIENT
Start: 2023-02-23 | End: 2023-02-27

## 2023-02-23 RX ORDER — MAGNESIUM SULFATE 1 G/100ML
1 INJECTION INTRAVENOUS AS NEEDED
Status: DISCONTINUED | OUTPATIENT
Start: 2023-02-23 | End: 2023-02-27

## 2023-02-23 RX ORDER — DEXTROSE MONOHYDRATE 25 G/50ML
50 INJECTION, SOLUTION INTRAVENOUS
Status: DISCONTINUED | OUTPATIENT
Start: 2023-02-23 | End: 2023-02-27

## 2023-02-23 RX ORDER — NICOTINE POLACRILEX 4 MG
30 LOZENGE BUCCAL
Status: DISCONTINUED | OUTPATIENT
Start: 2023-02-23 | End: 2023-02-27

## 2023-02-23 RX ORDER — METHYLPREDNISOLONE SODIUM SUCCINATE 500 MG/8ML
INJECTION INTRAMUSCULAR; INTRAVENOUS AS NEEDED
Status: DISCONTINUED | OUTPATIENT
Start: 2023-02-23 | End: 2023-02-23 | Stop reason: SURG

## 2023-02-23 RX ORDER — ROSUVASTATIN CALCIUM 20 MG/1
40 TABLET, COATED ORAL NIGHTLY
Status: DISCONTINUED | OUTPATIENT
Start: 2023-02-23 | End: 2023-02-27

## 2023-02-23 RX ORDER — MORPHINE SULFATE 2 MG/ML
2 INJECTION, SOLUTION INTRAMUSCULAR; INTRAVENOUS EVERY 2 HOUR PRN
Status: DISCONTINUED | OUTPATIENT
Start: 2023-02-23 | End: 2023-02-27

## 2023-02-23 RX ORDER — ASPIRIN 81 MG/1
81 TABLET ORAL DAILY
Status: DISCONTINUED | OUTPATIENT
Start: 2023-02-24 | End: 2023-02-27

## 2023-02-23 RX ORDER — MELATONIN
3 NIGHTLY PRN
Status: DISCONTINUED | OUTPATIENT
Start: 2023-02-23 | End: 2023-02-27

## 2023-02-23 RX ORDER — MORPHINE SULFATE 4 MG/ML
4 INJECTION, SOLUTION INTRAMUSCULAR; INTRAVENOUS EVERY 2 HOUR PRN
Status: DISCONTINUED | OUTPATIENT
Start: 2023-02-23 | End: 2023-02-27

## 2023-02-23 RX ORDER — PANTOPRAZOLE SODIUM 40 MG/1
40 TABLET, DELAYED RELEASE ORAL
Status: DISCONTINUED | OUTPATIENT
Start: 2023-02-23 | End: 2023-02-27

## 2023-02-23 RX ORDER — BISACODYL 10 MG
10 SUPPOSITORY, RECTAL RECTAL
Status: DISCONTINUED | OUTPATIENT
Start: 2023-02-23 | End: 2023-02-27

## 2023-02-23 RX ORDER — ACETAMINOPHEN 10 MG/ML
1000 INJECTION, SOLUTION INTRAVENOUS EVERY 6 HOURS
Status: DISPENSED | OUTPATIENT
Start: 2023-02-23 | End: 2023-02-24

## 2023-02-23 RX ORDER — ONDANSETRON 2 MG/ML
4 INJECTION INTRAMUSCULAR; INTRAVENOUS EVERY 6 HOURS PRN
Status: DISCONTINUED | OUTPATIENT
Start: 2023-02-23 | End: 2023-02-27

## 2023-02-23 RX ORDER — PROTAMINE SULFATE 10 MG/ML
INJECTION, SOLUTION INTRAVENOUS AS NEEDED
Status: DISCONTINUED | OUTPATIENT
Start: 2023-02-23 | End: 2023-02-23 | Stop reason: SURG

## 2023-02-23 RX ORDER — DEXMEDETOMIDINE HYDROCHLORIDE 4 UG/ML
INJECTION, SOLUTION INTRAVENOUS CONTINUOUS
Status: DISCONTINUED | OUTPATIENT
Start: 2023-02-23 | End: 2023-02-25

## 2023-02-23 RX ORDER — POLYETHYLENE GLYCOL 3350 17 G/17G
17 POWDER, FOR SOLUTION ORAL DAILY PRN
Status: DISCONTINUED | OUTPATIENT
Start: 2023-02-23 | End: 2023-02-27

## 2023-02-23 RX ORDER — VANCOMYCIN 1.75 GRAM/500 ML IN 0.9 % SODIUM CHLORIDE INTRAVENOUS
15 EVERY 12 HOURS
Status: COMPLETED | OUTPATIENT
Start: 2023-02-23 | End: 2023-02-24

## 2023-02-23 RX ORDER — VANCOMYCIN HYDROCHLORIDE 1 G/20ML
INJECTION, POWDER, LYOPHILIZED, FOR SOLUTION INTRAVENOUS AS NEEDED
Status: DISCONTINUED | OUTPATIENT
Start: 2023-02-23 | End: 2023-02-23 | Stop reason: SURG

## 2023-02-23 RX ORDER — SODIUM PHOSPHATE, DIBASIC AND SODIUM PHOSPHATE, MONOBASIC 7; 19 G/133ML; G/133ML
1 ENEMA RECTAL ONCE AS NEEDED
Status: DISCONTINUED | OUTPATIENT
Start: 2023-02-23 | End: 2023-02-27

## 2023-02-23 RX ORDER — DIPHENHYDRAMINE HYDROCHLORIDE 50 MG/ML
12.5 INJECTION INTRAMUSCULAR; INTRAVENOUS EVERY 4 HOURS PRN
Status: DISCONTINUED | OUTPATIENT
Start: 2023-02-23 | End: 2023-02-25

## 2023-02-23 RX ORDER — PHENYLEPHRINE HCL 10 MG/ML
VIAL (ML) INJECTION AS NEEDED
Status: DISCONTINUED | OUTPATIENT
Start: 2023-02-23 | End: 2023-02-23 | Stop reason: SURG

## 2023-02-23 RX ORDER — MIDAZOLAM HYDROCHLORIDE 1 MG/ML
INJECTION INTRAMUSCULAR; INTRAVENOUS AS NEEDED
Status: DISCONTINUED | OUTPATIENT
Start: 2023-02-23 | End: 2023-02-23 | Stop reason: SURG

## 2023-02-23 RX ORDER — DEXMEDETOMIDINE HYDROCHLORIDE 4 UG/ML
INJECTION, SOLUTION INTRAVENOUS CONTINUOUS PRN
Status: DISCONTINUED | OUTPATIENT
Start: 2023-02-23 | End: 2023-02-23 | Stop reason: SURG

## 2023-02-23 RX ORDER — POTASSIUM CHLORIDE 29.8 MG/ML
40 INJECTION INTRAVENOUS AS NEEDED
Status: DISCONTINUED | OUTPATIENT
Start: 2023-02-23 | End: 2023-02-27

## 2023-02-23 RX ORDER — ROCURONIUM BROMIDE 10 MG/ML
INJECTION, SOLUTION INTRAVENOUS AS NEEDED
Status: DISCONTINUED | OUTPATIENT
Start: 2023-02-23 | End: 2023-02-23 | Stop reason: SURG

## 2023-02-23 RX ORDER — CEFAZOLIN SODIUM/WATER 2 G/20 ML
2 SYRINGE (ML) INTRAVENOUS
Status: COMPLETED | OUTPATIENT
Start: 2023-02-24 | End: 2023-02-23

## 2023-02-23 RX ORDER — IPRATROPIUM BROMIDE AND ALBUTEROL SULFATE 2.5; .5 MG/3ML; MG/3ML
3 SOLUTION RESPIRATORY (INHALATION) EVERY 4 HOURS PRN
Status: DISCONTINUED | OUTPATIENT
Start: 2023-02-23 | End: 2023-02-27

## 2023-02-23 RX ORDER — SODIUM CHLORIDE 9 MG/ML
INJECTION, SOLUTION INTRAVENOUS CONTINUOUS
Status: DISCONTINUED | OUTPATIENT
Start: 2023-02-23 | End: 2023-02-25

## 2023-02-23 RX ORDER — CEFAZOLIN SODIUM/WATER 2 G/20 ML
2 SYRINGE (ML) INTRAVENOUS EVERY 8 HOURS
Status: COMPLETED | OUTPATIENT
Start: 2023-02-23 | End: 2023-02-25

## 2023-02-23 RX ORDER — POTASSIUM CHLORIDE 14.9 MG/ML
20 INJECTION INTRAVENOUS AS NEEDED
Status: DISCONTINUED | OUTPATIENT
Start: 2023-02-23 | End: 2023-02-27

## 2023-02-23 RX ORDER — DOBUTAMINE HYDROCHLORIDE 200 MG/100ML
INJECTION INTRAVENOUS CONTINUOUS PRN
Status: DISCONTINUED | OUTPATIENT
Start: 2023-02-23 | End: 2023-02-23 | Stop reason: SURG

## 2023-02-23 RX ORDER — ALBUMIN, HUMAN INJ 5% 5 %
12.5 SOLUTION INTRAVENOUS ONCE AS NEEDED
Status: DISCONTINUED | OUTPATIENT
Start: 2023-02-23 | End: 2023-02-27

## 2023-02-23 RX ORDER — HEPARIN SODIUM 1000 [USP'U]/ML
INJECTION, SOLUTION INTRAVENOUS; SUBCUTANEOUS AS NEEDED
Status: DISCONTINUED | OUTPATIENT
Start: 2023-02-23 | End: 2023-02-23 | Stop reason: SURG

## 2023-02-23 RX ORDER — SODIUM CHLORIDE 9 MG/ML
INJECTION, SOLUTION INTRAVENOUS CONTINUOUS PRN
Status: DISCONTINUED | OUTPATIENT
Start: 2023-02-23 | End: 2023-02-23 | Stop reason: SURG

## 2023-02-23 RX ORDER — MIDAZOLAM HYDROCHLORIDE 1 MG/ML
1 INJECTION INTRAMUSCULAR; INTRAVENOUS EVERY 30 MIN PRN
Status: DISCONTINUED | OUTPATIENT
Start: 2023-02-23 | End: 2023-02-27

## 2023-02-23 RX ORDER — SENNOSIDES 8.6 MG
17.2 TABLET ORAL NIGHTLY PRN
Status: DISCONTINUED | OUTPATIENT
Start: 2023-02-23 | End: 2023-02-27

## 2023-02-23 RX ORDER — DOBUTAMINE HYDROCHLORIDE 200 MG/100ML
INJECTION INTRAVENOUS CONTINUOUS PRN
Status: DISCONTINUED | OUTPATIENT
Start: 2023-02-23 | End: 2023-02-27

## 2023-02-23 RX ORDER — DEXTROSE AND SODIUM CHLORIDE 5; .45 G/100ML; G/100ML
INJECTION, SOLUTION INTRAVENOUS CONTINUOUS
Status: DISCONTINUED | OUTPATIENT
Start: 2023-02-23 | End: 2023-02-25

## 2023-02-23 RX ORDER — NALOXONE HYDROCHLORIDE 0.4 MG/ML
0.08 INJECTION, SOLUTION INTRAMUSCULAR; INTRAVENOUS; SUBCUTANEOUS
Status: DISCONTINUED | OUTPATIENT
Start: 2023-02-23 | End: 2023-02-25

## 2023-02-23 RX ORDER — MAGNESIUM SULFATE HEPTAHYDRATE 40 MG/ML
2 INJECTION, SOLUTION INTRAVENOUS AS NEEDED
Status: DISCONTINUED | OUTPATIENT
Start: 2023-02-23 | End: 2023-02-27

## 2023-02-23 RX ADMIN — MIDAZOLAM HYDROCHLORIDE 5 MG: 1 INJECTION INTRAMUSCULAR; INTRAVENOUS at 09:27:00

## 2023-02-23 RX ADMIN — MIDAZOLAM HYDROCHLORIDE 5 MG: 1 INJECTION INTRAMUSCULAR; INTRAVENOUS at 10:23:00

## 2023-02-23 RX ADMIN — ROCURONIUM BROMIDE 50 MG: 10 INJECTION, SOLUTION INTRAVENOUS at 07:12:00

## 2023-02-23 RX ADMIN — ROCURONIUM BROMIDE 20 MG: 10 INJECTION, SOLUTION INTRAVENOUS at 09:27:00

## 2023-02-23 RX ADMIN — DOBUTAMINE HYDROCHLORIDE 5 MCG/KG/MIN: 200 INJECTION INTRAVENOUS at 10:47:00

## 2023-02-23 RX ADMIN — PHENYLEPHRINE HCL 50 MCG: 10 MG/ML VIAL (ML) INJECTION at 09:22:00

## 2023-02-23 RX ADMIN — DEXMEDETOMIDINE HYDROCHLORIDE 0.5 MCG/KG/HR: 4 INJECTION, SOLUTION INTRAVENOUS at 08:14:00

## 2023-02-23 RX ADMIN — ROCURONIUM BROMIDE 10 MG: 10 INJECTION, SOLUTION INTRAVENOUS at 08:15:00

## 2023-02-23 RX ADMIN — VANCOMYCIN HYDROCHLORIDE 1000 MG: 1 INJECTION, POWDER, LYOPHILIZED, FOR SOLUTION INTRAVENOUS at 07:33:00

## 2023-02-23 RX ADMIN — ROCURONIUM BROMIDE 10 MG: 10 INJECTION, SOLUTION INTRAVENOUS at 10:23:00

## 2023-02-23 RX ADMIN — DOBUTAMINE HYDROCHLORIDE 4 MCG/KG/MIN: 200 INJECTION INTRAVENOUS at 10:43:00

## 2023-02-23 RX ADMIN — CEFAZOLIN SODIUM/WATER 2 G: 2 G/20 ML SYRINGE (ML) INTRAVENOUS at 07:24:00

## 2023-02-23 RX ADMIN — HEPARIN SODIUM 29000 UNITS: 1000 INJECTION, SOLUTION INTRAVENOUS; SUBCUTANEOUS at 09:17:00

## 2023-02-23 RX ADMIN — HEPARIN SODIUM 5000 UNITS: 1000 INJECTION, SOLUTION INTRAVENOUS; SUBCUTANEOUS at 08:55:00

## 2023-02-23 RX ADMIN — PROTAMINE SULFATE 10 MG: 10 INJECTION, SOLUTION INTRAVENOUS at 10:57:00

## 2023-02-23 RX ADMIN — SODIUM CHLORIDE: 9 INJECTION, SOLUTION INTRAVENOUS at 07:07:00

## 2023-02-23 RX ADMIN — MIDAZOLAM HYDROCHLORIDE 2 MG: 1 INJECTION INTRAMUSCULAR; INTRAVENOUS at 07:07:00

## 2023-02-23 RX ADMIN — CEFAZOLIN SODIUM/WATER 2 G: 2 G/20 ML SYRINGE (ML) INTRAVENOUS at 11:17:00

## 2023-02-23 RX ADMIN — MIDAZOLAM HYDROCHLORIDE 2 MG: 1 INJECTION INTRAMUSCULAR; INTRAVENOUS at 07:09:00

## 2023-02-23 RX ADMIN — SODIUM CHLORIDE: 9 INJECTION, SOLUTION INTRAVENOUS at 07:42:00

## 2023-02-23 RX ADMIN — MIDAZOLAM HYDROCHLORIDE 1 MG: 1 INJECTION INTRAMUSCULAR; INTRAVENOUS at 07:12:00

## 2023-02-23 RX ADMIN — ROCURONIUM BROMIDE 10 MG: 10 INJECTION, SOLUTION INTRAVENOUS at 07:37:00

## 2023-02-23 RX ADMIN — PROTAMINE SULFATE 450 MG: 10 INJECTION, SOLUTION INTRAVENOUS at 11:00:00

## 2023-02-23 RX ADMIN — PHENYLEPHRINE HCL 50 MCG: 10 MG/ML VIAL (ML) INJECTION at 09:21:00

## 2023-02-23 RX ADMIN — SODIUM CHLORIDE: 9 INJECTION, SOLUTION INTRAVENOUS at 10:55:00

## 2023-02-23 RX ADMIN — METHYLPREDNISOLONE SODIUM SUCCINATE 500 MG: 500 INJECTION INTRAMUSCULAR; INTRAVENOUS at 07:42:00

## 2023-02-23 NOTE — ANESTHESIA POSTPROCEDURE EVALUATION
[address]. Patient Status:  Inpatient   Age/Gender 50year old male MRN [id number]   [facility] 6NE-A Attending Ema Saldivar MD   Hosp Day # 0 PCP Delmar Hendricks MD       Anesthesia Post-op Note    CORONARY ARTERY BYPASS GRAFT X3 WITH PLATES USING LEFT INTERAL MAMMARY ARTERY AND LEFT SAPHENOUS ENDOVEIN, INTRAOPERATIVE TRANSESOPHAGEAL ECHOCARDIOGRAM    Procedure Summary     Date: 02/23/23 Room / Location: [facility] CVOR 02 / [facility] CVOR    Anesthesia Start: 0704 Anesthesia Stop: 2642    Procedure: CORONARY ARTERY BYPASS GRAFT X3 WITH PLATES USING LEFT INTERAL MAMMARY ARTERY AND LEFT SAPHENOUS ENDOVEIN, INTRAOPERATIVE TRANSESOPHAGEAL ECHOCARDIOGRAM Diagnosis: (CORONARY ARTERY DISEASE)    Surgeons: mEa Saldivar MD Anesthesiologist: Chad Shah MD    Anesthesia Type: general ASA Status: 4          Anesthesia Type: general    Vitals Value Taken Time   /60 02/23/23 1154   Temp 96.3 02/23/23 1154   Pulse 84 02/23/23 1153   Resp 13 02/23/23 1153   SpO2 98 % 02/23/23 1153   Vitals shown include unvalidated device data. Patient Location: ICU    Anesthesia Type: general    Airway Patency: intubated    Postop Pain Control: sedated until time of extubation    Mental Status: sedated until time of extubation    Nausea/Vomiting: none    Cardiopulmonary/Hydration status: stable euvolemic    Complications: no apparent anesthesia related complications    Postop vital signs: stable    Comments: Report to CCU RN    Dental Exam: Unchanged from Preop    Patient to be transferred to ICU.

## 2023-02-23 NOTE — OPERATIVE REPORT
Operative Report     Patient Name: Amairani Serna Dx: CAD     Post-Op Dx: same     Procedure: CABG x 3; LIMA to LAD, SVG to diagonal, SVG to OM     Surgeon: JOVON Conde MD     Asst: Renaldo Bear PA-C     Anesthesia: Cardiac     Complications: none     Specimen: none     Implants: none     Drains: 36 Fr CT x 2     EBL: ~800cc     Dispo: critical but stable on transfer

## 2023-02-23 NOTE — ANESTHESIA PROCEDURE NOTES
Procedure Performed: JODEE     Start Time:  2/23/2023 7:46 AM       End Time:      Preanesthesia Checklist:  Patient identified, IV assessed, risks and benefits discussed, monitors and equipment assessed, procedure being performed at surgeon's request and anesthesia consent obtained. General Procedure Information  Diagnostic Indications for Echo:  assessment of ascending aorta  Physician Requesting Echo: Vanessa Gill MD  Location performed:  OR  Intubated  Bite block placed  Heart visualized  Probe Insertion:  Easy  Probe Type:  Multiplane  Modalities:  2D only, color flow mapping, pulse wave Doppler and continuous wave Doppler    Echocardiographic and Doppler Measurements    Ventricles    Right Ventricle:  Cavity size normal.  Hypertrophy not present. Thrombus not present. Global function normal.    Left Ventricle:  Cavity size normal.  Hypertrophy not present. Thrombus not present. Global Function normal.      Ventricular Regional Function:  1- Basal Anteroseptal:  normal  2- Basal Anterior:  normal  3- Basal Anterolateral:  normal  4- Basal Inferolateral:  normal  5- Basal Inferior:  normal  6- Basal Inferoseptal:  normal  7- Mid Anteroseptal:  normal  8- Mid Anterior:  normal  9- Mid Anterolateral:  normal  10- Mid Inferolateral:  normal  11- Mid Inferior:  normal  12- Mid Inferoseptal:  normal  13- Apical Anterior:  normal  14- Apical Lateral:  normal  15- Apical Inferior:  normal  16- Apical Septal:  normal      Valves    Aortic Valve: Annulus normal.  Stenosis not present. Regurgitation absent. Leaflets normal.  Leaflet motions normal.      Mitral Valve: Annulus normal.  Stenosis not present. Regurgitation +1. Leaflets normal.  Leaflet motions normal.      Tricuspid Valve: Annulus normal.  Stenosis not present. Regurgitation absent. Leaflets normal.  Leaflet motions normal.          Aorta    Ascending Aorta:  Diameter 3.03 cm. Dissection not present. Plaque thickness less than 3 mm.   Mobile plaque not present. Aortic Arch:  Size normal.  Dissection not present. Plaque thickness less than 3 mm. Mobile plaque not present. Descending Aorta:  Size normal.  Diameter 1.71 cm. Dissection not present. Plaque thickness less than 3 mm. Mobile plaque not present. Atria    Right Atrium:  Size normal.  Spontaneous echo contrast not present. Thrombus not present. Tumor not present. Device not present. Left Atrium:  Size normal.  Spontaneous echo contrast not present. Thrombus not present. Tumor not present. Device not present.     Left atrial appendage normal.      Septa    Atrial Septum:  Intra-atrial septal morphology normal.      Ventricular Septum:  Intra-ventricular septum morphology normal.          Other Findings  Pericardium:  normal  Pleural Effusion:  none  Pulmonary Arteries:  normal  Pulmonary Venous Flow:  normal    Anesthesia Information  Performed Personally  Anesthesiologist:  Abelino Germain MD      Post    Ventricular FXN:  Global FXN: Unchanged   Regional FXN: Unchanged  Valve FXN:  Native Valve:No change      Comments: Aortic cannulation site evaluation no dissection seen    Complications:None

## 2023-02-23 NOTE — ANESTHESIA PROCEDURE NOTES
Central Line    Date/Time: 2/23/2023 7:20 AM    Performed by: Queenie James MD  Authorized by: Queenie James MD    General Information and Staff    Procedure Start:  2/23/2023 7:20 AM  Procedure End:  2/23/2023 7:35 AM  Anesthesiologist:  Queenie James MD  Performed by:   Anesthesiologist  Patient Location:  OR  Indication: central venous access and CVP monitoring    Site Identification: real time ultrasound guided and image stored and retrievable    Preanesthetic Checklist: 2 patient identifiers, IV checked, risks and benefits discussed, monitors and equipment checked, pre-op evaluation, timeout performed, anesthesia consent and sterile technique used    Procedure Detail    Patient Position:  Trendelenburg  Laterality:  Right  Site:  Internal jugular  Prep:  Chloraprep  Catheter Size:  9 Fr  Catheter Type:  MAC introducer  Number of Lumens:  Double lumen  Procedure Detail: target vein identified, needle advanced into vein and blood aspirated and guidewire advanced into vein    Seldinger Technique?: Yes    Intravenous Verification: verified by ultrasound and venous blood return    Post Insertion: all ports aspirated, all ports flushed easily, guidewire was removed intact, line was sutured in place and dressing was applied      Assessment    Events: patient tolerated procedure well with no complications      PA Catheter Placement    PA Catheter Placed?: Yes    PA Catheter Type:  Oximetric  PA Catheter Size:  8  Laterality:  Right  Site:  Internal jugular  Placement Confirmation: pressure tracing changes and verified by JODEE    Events: patient tolerated procedure well with no complications      Additional Comments

## 2023-02-23 NOTE — DIETARY NOTE
Clinical Nutrition     Dietitian consult received per cardiac rehab standing order. Pt to be educated by cardiac rehab staff and encouraged to attend outpatient classes taught by RD. RD available PRN.     Shabana Leon, [address] [place] [address], [address] [place] [address]   Clinical Dietitian  Spectra: [phone]

## 2023-02-23 NOTE — ANESTHESIA PROCEDURE NOTES
Arterial Line    Date/Time: 2/23/2023 7:10 AM    Performed by: Kb Lombardo MD  Authorized by: Kb Lombardo MD    General Information and Staff    Procedure Start:  2/23/2023 7:10 AM  Procedure End:  2/23/2023 7:11 AM  Anesthesiologist:  Kb Lombardo MD  Performed By:  Anesthesiologist  Patient Location:  OR  Indication: continuous blood pressure monitoring and blood sampling needed    Site Identification: real time ultrasound guided and surface landmarks    Preanesthetic Checklist: 2 patient identifiers, IV checked, risks and benefits discussed, monitors and equipment checked, pre-op evaluation, timeout performed, anesthesia consent and sterile technique used    Procedure Details    Catheter Size:  20 G  Catheter Length:  1 and 3/4 inchCatheter Type:  Arrow  Seldinger Technique?: Yes    Laterality:  LeftSite:  Radial artery  Site Prep: chlorhexidine  Line Secured:  Wrist Brace, tape and Tegaderm    Assessment    Events: patient tolerated procedure well with no complications      Medications  2/23/2023 7:10 AM      Additional Comments

## 2023-02-23 NOTE — DISCHARGE INSTRUCTIONS
To access the Dr. Edith Ballesteros discharge instructions video on your home computer:   [email or web address]    Remove steri strips from all incisions on 3/9    Sometimes managing your health at home requires assistance. The [facility] [place] team has recognized your preference to use [facility]. They can be reached by phone at [phone]. The fax number for your reference is [phone]. A representative from the home health agency will contact you or your family to schedule your first visit.      [facility] @ discharge  [phone]

## 2023-02-23 NOTE — ANESTHESIA PROCEDURE NOTES
Airway  Date/Time: 2/23/2023 7:14 AM  Urgency: elective    Airway not difficult    General Information and Staff    Patient location during procedure: OR  Anesthesiologist: Sera Guadarrama MD  Performed: anesthesiologist   Performed by: Sera Guadarrama MD  Authorized by: Sera Guadarrama MD      Indications and Patient Condition  Indications for airway management: anesthesia  Sedation level: deep  Preoxygenated: yes  Patient position: sniffing  Mask difficulty assessment: 1 - vent by mask    Final Airway Details  Final airway type: endotracheal airway      Successful airway: ETT  Cuffed: yes   Successful intubation technique: Video laryngoscopy  Facilitating devices/methods: intubating stylet  Endotracheal tube insertion site: oral  Blade: GlideScope  Blade size: #4  ETT size (mm): 8.0    Cormack-Lehane Classification: grade I - full view of glottis  Placement verified by: chest auscultation and capnometry   Measured from: lips  ETT to lips (cm): 22  Number of attempts at approach: 1

## 2023-02-23 NOTE — PLAN OF CARE
Received patient from [place] [address] at 1200. Patient intubated, sedated on propofol and precedex gtts. Monitor shows sinus rhythm. Ventricular pacing wires in place. Titrating dobs and nitro gtts to keep SBP within parameters. Right femoral a-line removed, groin soft/no hematoma. Insulin gtt infusing. See flow sheets for chest tube and cespedes outputs. Dilaudid pca in place for pain. Sedation turned off at 1530. Patient able to move all extremities and follow commands. Breathing trial completed. ABG results paged to Dr. Burris Hugheston, orders to extubate. Patient extubated at 1640 to nasal cannula, tolerated well. Patient and family updated on plan of care.

## 2023-02-24 ENCOUNTER — APPOINTMENT (OUTPATIENT)
Dept: GENERAL RADIOLOGY | Facility: HOSPITAL | Age: 48
DRG: 236 | End: 2023-02-24
Attending: PHYSICIAN ASSISTANT
Payer: COMMERCIAL

## 2023-02-24 ENCOUNTER — APPOINTMENT (OUTPATIENT)
Dept: GENERAL RADIOLOGY | Facility: HOSPITAL | Age: 48
DRG: 236 | End: 2023-02-24
Attending: THORACIC SURGERY (CARDIOTHORACIC VASCULAR SURGERY)
Payer: COMMERCIAL

## 2023-02-24 LAB
ATRIAL RATE: 90 BPM
BASE EXCESS BLD CALC-SCNC: -4 MMOL/L
BASOPHILS # BLD AUTO: 0.01 X10(3) UL (ref 0–0.2)
BASOPHILS NFR BLD AUTO: 0.1 %
BUN BLD-MCNC: 19 MG/DL (ref 7–18)
CA-I BLD-SCNC: 1.31 MMOL/L (ref 1.12–1.32)
CALCIUM BLD-MCNC: 8.3 MG/DL (ref 8.5–10.1)
CHLORIDE SERPL-SCNC: 110 MMOL/L (ref 98–112)
CO2 BLD-SCNC: 22 MMOL/L (ref 22–32)
CO2 SERPL-SCNC: 24 MMOL/L (ref 21–32)
CREAT BLD-MCNC: 1.32 MG/DL
EOSINOPHIL # BLD AUTO: 0 X10(3) UL (ref 0–0.7)
EOSINOPHIL NFR BLD AUTO: 0 %
ERYTHROCYTE [DISTWIDTH] IN BLOOD BY AUTOMATED COUNT: 12.3 %
GFR SERPLBLD BASED ON 1.73 SQ M-ARVRAT: 66 ML/MIN/1.73M2 (ref 60–?)
GLUCOSE BLD-MCNC: 100 MG/DL (ref 70–99)
GLUCOSE BLD-MCNC: 108 MG/DL (ref 70–99)
GLUCOSE BLD-MCNC: 117 MG/DL (ref 70–99)
GLUCOSE BLD-MCNC: 119 MG/DL (ref 70–99)
GLUCOSE BLD-MCNC: 124 MG/DL (ref 70–99)
GLUCOSE BLD-MCNC: 128 MG/DL (ref 70–99)
GLUCOSE BLD-MCNC: 130 MG/DL (ref 70–99)
GLUCOSE BLD-MCNC: 133 MG/DL (ref 70–99)
GLUCOSE BLD-MCNC: 142 MG/DL (ref 70–99)
GLUCOSE BLD-MCNC: 159 MG/DL (ref 70–99)
GLUCOSE BLD-MCNC: 178 MG/DL (ref 70–99)
GLUCOSE BLD-MCNC: 214 MG/DL (ref 70–99)
HCO3 BLD-SCNC: 21 MEQ/L
HCT VFR BLD AUTO: 39.2 %
HCT VFR BLD CALC: 33 %
HGB BLD-MCNC: 13.4 G/DL
IMM GRANULOCYTES # BLD AUTO: 0.07 X10(3) UL (ref 0–1)
IMM GRANULOCYTES NFR BLD: 0.4 %
LYMPHOCYTES # BLD AUTO: 0.85 X10(3) UL (ref 1–4)
LYMPHOCYTES NFR BLD AUTO: 5.2 %
MAGNESIUM SERPL-MCNC: 2 MG/DL (ref 1.6–2.6)
MCH RBC QN AUTO: 31.1 PG (ref 26–34)
MCHC RBC AUTO-ENTMCNC: 34.2 G/DL (ref 31–37)
MCV RBC AUTO: 91 FL
MONOCYTES # BLD AUTO: 1.4 X10(3) UL (ref 0.1–1)
MONOCYTES NFR BLD AUTO: 8.5 %
NEUTROPHILS # BLD AUTO: 14.15 X10 (3) UL (ref 1.5–7.7)
NEUTROPHILS # BLD AUTO: 14.15 X10(3) UL (ref 1.5–7.7)
NEUTROPHILS NFR BLD AUTO: 85.8 %
P AXIS: 49 DEGREES
P-R INTERVAL: 144 MS
PCO2 BLD: 36.8 MMHG
PH BLD: 7.37 [PH]
PLATELET # BLD AUTO: 156 10(3)UL (ref 150–450)
PO2 BLD: 73 MMHG
POTASSIUM BLD-SCNC: 4.2 MMOL/L (ref 3.6–5.1)
POTASSIUM SERPL-SCNC: 4.3 MMOL/L (ref 3.5–5.1)
Q-T INTERVAL: 362 MS
QRS DURATION: 88 MS
QTC CALCULATION (BEZET): 442 MS
R AXIS: -10 DEGREES
RBC # BLD AUTO: 4.31 X10(6)UL
SAO2 % BLD: 94 %
SODIUM BLD-SCNC: 139 MMOL/L (ref 136–145)
SODIUM SERPL-SCNC: 140 MMOL/L (ref 136–145)
T AXIS: -34 DEGREES
VENTRICULAR RATE: 90 BPM
WBC # BLD AUTO: 16.5 X10(3) UL (ref 4–11)

## 2023-02-24 PROCEDURE — 71045 X-RAY EXAM CHEST 1 VIEW: CPT | Performed by: PHYSICIAN ASSISTANT

## 2023-02-24 PROCEDURE — 71045 X-RAY EXAM CHEST 1 VIEW: CPT | Performed by: THORACIC SURGERY (CARDIOTHORACIC VASCULAR SURGERY)

## 2023-02-24 PROCEDURE — 99232 SBSQ HOSP IP/OBS MODERATE 35: CPT | Performed by: STUDENT IN AN ORGANIZED HEALTH CARE EDUCATION/TRAINING PROGRAM

## 2023-02-24 RX ORDER — METOPROLOL TARTRATE 50 MG/1
50 TABLET, FILM COATED ORAL
Status: DISCONTINUED | OUTPATIENT
Start: 2023-02-24 | End: 2023-02-27

## 2023-02-24 RX ORDER — FUROSEMIDE 10 MG/ML
20 INJECTION INTRAMUSCULAR; INTRAVENOUS ONCE
Status: COMPLETED | OUTPATIENT
Start: 2023-02-24 | End: 2023-02-24

## 2023-02-24 NOTE — PLAN OF CARE
Assumed care of pt at approximately 1930. VSS, NSR on tele. On 3L while awake, and CPAP at night. Dobs, insulin infusing, both adjusted per protocol. Completely weaned off of dobs during night. A line and cespedes remains intact, cespedes care done. Chest tune output consistent overnight, see flowsheets. Pacer wires remain connected to pacer. Surgical sites are C/D/I. This RN answered all of the pt's questions, and he verbalized understanding of teaching. Up to chair in morning.

## 2023-02-24 NOTE — PLAN OF CARE
Patient remains alert and oriented x4, VSS on 2L nasal Cannula. CPAP on at night for sleeping. Lasix and metoprolol started, insulin switched to sliding scale. A-line, cespedes, swan-maldonado, cordis, and chest tubes removed per order Chest x-ray after chest tube removal stable. Pacer wires remain in. PCA pump and IV orfirmev used for pain control with relief. Surgical sites C/D/I/. Patient up in halls walking with ease. Family at bedside participating in care.

## 2023-02-24 NOTE — OPERATIVE REPORT
[facility]    PATIENT'S NAME: Letty Giron   ATTENDING PHYSICIAN: Sonido Mcclain MD   OPERATING PHYSICIAN: Sonido Mcclain MD   PATIENT ACCOUNT#:   [id number]    LOCATION:  [address] [place] [address]  MEDICAL RECORD #:   [id number]       YOB: 1975  ADMISSION DATE:       02/23/2023      OPERATION DATE:  02/23/2023    OPERATIVE REPORT    PREOPERATIVE DIAGNOSIS:  Coronary artery disease. POSTOPERATIVE DIAGNOSIS:  Coronary artery disease. PROCEDURE:  Coronary revascularization x3: Left internal mammary artery graft to the left anterior descending; saphenous vein graft to the diagonal and obtuse marginal coronary arteries. ASSISTANT:  Umair Hurtado PA-C. ANESTHESIA:  General endotracheal.    BLOOD LOSS:  600 mL. COMPLICATIONS:  None. TRANSFUSION:  None. INDICATIONS:  Patient is a 39-year-old gentleman with recently diagnosed severe multivessel coronary artery disease not amenable to percutaneous treatment. Risks, benefits, and options of surgery were discussed. OPERATIVE TECHNIQUE:  Appropriate lines and catheters were placed by Dr. Arti Scanlon. General anesthesia induced. [place] was placed. Transesophageal echo was placed. Transcranial oximetry was placed. Common femoral arterial line was placed. The patient was prepped and draped. Sternotomy was performed. Left internal mammary artery was taken down. Greater saphenous graft was harvested endoscopically from the left leg. The patient was heparinized and cannulated for bypass. On bypass, patient was cooled to 32 degrees centigrade. The aorta was crossclamped. Blood cardioplegia was infused to achieve electromechanical arrest of the heart. I first inspected the right coronary artery as I could not tell from the angiogram whether there was going to be any bypassable segment of this artery. I looked at it fairly extensively. There really was no good place to get into the artery.   Cardioplegia was given, following which we bypassed the obtuse marginal which was a 1.5 mm artery of fairly good quality. Cardioplegia was given, following which the diagonal was bypassed. This also was a 1.5 mm artery of fairly good quality. Proximally, it was pretty calcified. Cardioplegia was given, following which the LAD was bypassed roughly in its midportion. We had to dissect out the LAD a little bit to get to where I opened it. This was a very good quality 1.5 to 2 mm artery. Mammary was 1.5 mm to 2 mm artery. Rewarming was begun while proximal anastomoses for the 2 vein grafts were constructed end-to-side to the aorta. Warm cardioplegia was given. The aorta was unclamped. Heart spontaneously defibrillated ultimately to sinus rhythm. Following complete and thorough de-airing and rewarming, the patient weaned from bypass without difficulty. Pump time 82 minutes. Crossclamp 69 minutes. The patient was decannulated. Protamine was administered. Pacing leads were applied to the ventricle. Chest was closed with double-stranded wire, supplemented with two 8-hole titanium plates for sternal security. The subcutaneous tissues and skin were closed. Patient was taken to the ICU in stable condition with good hemodynamics and without requirement for blood transfusion. The patient's family was updated by me in person regarding the patient's condition and the conduct of the operation.     Dictated By Iline Schilder, MD  d: 02/23/2023 12:05:41  t: 02/23/2023 19:23:40  Job [id number]  /

## 2023-02-25 LAB
BUN BLD-MCNC: 25 MG/DL (ref 7–18)
CALCIUM BLD-MCNC: 8.6 MG/DL (ref 8.5–10.1)
CHLORIDE SERPL-SCNC: 105 MMOL/L (ref 98–112)
CO2 SERPL-SCNC: 26 MMOL/L (ref 21–32)
CREAT BLD-MCNC: 1.21 MG/DL
ERYTHROCYTE [DISTWIDTH] IN BLOOD BY AUTOMATED COUNT: 12.6 %
GFR SERPLBLD BASED ON 1.73 SQ M-ARVRAT: 73 ML/MIN/1.73M2 (ref 60–?)
GLUCOSE BLD-MCNC: 127 MG/DL (ref 70–99)
GLUCOSE BLD-MCNC: 132 MG/DL (ref 70–99)
GLUCOSE BLD-MCNC: 153 MG/DL (ref 70–99)
GLUCOSE BLD-MCNC: 161 MG/DL (ref 70–99)
GLUCOSE BLD-MCNC: 162 MG/DL (ref 70–99)
HCT VFR BLD AUTO: 36.6 %
HGB BLD-MCNC: 11.7 G/DL
MAGNESIUM SERPL-MCNC: 2.1 MG/DL (ref 1.6–2.6)
MCH RBC QN AUTO: 30.6 PG (ref 26–34)
MCHC RBC AUTO-ENTMCNC: 32 G/DL (ref 31–37)
MCV RBC AUTO: 95.8 FL
PLATELET # BLD AUTO: 143 10(3)UL (ref 150–450)
POTASSIUM SERPL-SCNC: 4.1 MMOL/L (ref 3.5–5.1)
RBC # BLD AUTO: 3.82 X10(6)UL
SODIUM SERPL-SCNC: 135 MMOL/L (ref 136–145)
WBC # BLD AUTO: 12.9 X10(3) UL (ref 4–11)

## 2023-02-25 PROCEDURE — 99232 SBSQ HOSP IP/OBS MODERATE 35: CPT | Performed by: STUDENT IN AN ORGANIZED HEALTH CARE EDUCATION/TRAINING PROGRAM

## 2023-02-25 RX ORDER — FUROSEMIDE 10 MG/ML
40 INJECTION INTRAMUSCULAR; INTRAVENOUS
Status: DISCONTINUED | OUTPATIENT
Start: 2023-02-25 | End: 2023-02-27

## 2023-02-25 RX ORDER — TRAMADOL HYDROCHLORIDE 50 MG/1
50 TABLET ORAL EVERY 6 HOURS PRN
Status: DISCONTINUED | OUTPATIENT
Start: 2023-02-25 | End: 2023-02-27

## 2023-02-25 RX ORDER — TRAMADOL HYDROCHLORIDE 50 MG/1
100 TABLET ORAL EVERY 6 HOURS PRN
Status: DISCONTINUED | OUTPATIENT
Start: 2023-02-25 | End: 2023-02-27

## 2023-02-25 NOTE — PLAN OF CARE
Assumed care of pt at approximately 1930. VSS on tele. CPAP overnight. Pt does have slight shallow breathing overnight, pt states it is unchanged from during the day. Dilaudid PCA for pain management. Ambulates to bathroom with steady gait and no assistive devices.  Up in chair for part of early morning, and back up in chair for breakfast.

## 2023-02-25 NOTE — PLAN OF CARE
Pt alert and oriented x4. Pt able to voice needs and follow commands. Pt denies being in any pain. Pt sitting in room chair, on room air. Pt to 500 on IS therapy, with a goal of 1500. Pt states SOB with exertion. Educated the patient on importance of IS therapy 10x/1hour every hour. Pt understood and shows proper use of IS therapy. Pt ambulated this AM with PT, and pt did well. Will continue to monitor. Call light within reach and pt's father at bedside. 1430: Pt transferred to 8600, report provided to [facility] [place] ext. [phone]. Pt transported via wheelchair, all belongings including cpap and insulin pen transferred with pt. Pt's father with transporter and pt. Pt with tele box present. Pt alert and oriented x4 on room air and appropriate for transfer.

## 2023-02-25 NOTE — PLAN OF CARE
NURSING NOTES:  1430[facility]. LAC saline lock. SR. Midsternal and L leg incision with steri strips open to air. Denies pain. 1600: Pt ambulated in the hallway-tolerated. 1700: Pt sitting up in the chair, watching TV. No complain.  BS-[id number]

## 2023-02-26 LAB
ANION GAP SERPL CALC-SCNC: 7 MMOL/L (ref 0–18)
BLOOD TYPE BARCODE: 5100
BUN BLD-MCNC: 22 MG/DL (ref 7–18)
CALCIUM BLD-MCNC: 8.9 MG/DL (ref 8.5–10.1)
CHLORIDE SERPL-SCNC: 104 MMOL/L (ref 98–112)
CO2 SERPL-SCNC: 27 MMOL/L (ref 21–32)
CREAT BLD-MCNC: 1.13 MG/DL
GFR SERPLBLD BASED ON 1.73 SQ M-ARVRAT: 80 ML/MIN/1.73M2 (ref 60–?)
GLUCOSE BLD-MCNC: 114 MG/DL (ref 70–99)
GLUCOSE BLD-MCNC: 137 MG/DL (ref 70–99)
GLUCOSE BLD-MCNC: 141 MG/DL (ref 70–99)
GLUCOSE BLD-MCNC: 158 MG/DL (ref 70–99)
GLUCOSE BLD-MCNC: 184 MG/DL (ref 70–99)
OSMOLALITY SERPL CALC.SUM OF ELEC: 292 MOSM/KG (ref 275–295)
POTASSIUM SERPL-SCNC: 4.2 MMOL/L (ref 3.5–5.1)
SODIUM SERPL-SCNC: 138 MMOL/L (ref 136–145)

## 2023-02-26 NOTE — PLAN OF CARE
Rec'd pt at 0730. A&O x 4. Tele shows NSR. O2 sats adequate on RA. Pt continent, urinal at bedside. Ambulates in zarate independently. No C/O pain or SOB. Sternal and donor sites CDI, steri-strips in place, betadine applied. Call light and personal items within reach. Will continue to monitor. POC - Home tomorrow afternoon, pacing wires to be removed tomorrow morning. Problem: CARDIOVASCULAR - ADULT  Goal: Maintains optimal cardiac output and hemodynamic stability  Description: INTERVENTIONS:  - Monitor vital signs, rhythm, and trends  - Monitor for bleeding, hypotension and signs of decreased cardiac output  - Evaluate effectiveness of vasoactive medications to optimize hemodynamic stability  - Monitor arterial and/or venous puncture sites for bleeding and/or hematoma  - Assess quality of pulses, skin color and temperature  - Assess for signs of decreased coronary artery perfusion - ex.  Angina  - Evaluate fluid balance, assess for edema, trend weights  Outcome: Progressing  Goal: Absence of cardiac arrhythmias or at baseline  Description: INTERVENTIONS:  - Continuous cardiac monitoring, monitor vital signs, obtain 12 lead EKG if indicated  - Evaluate effectiveness of antiarrhythmic and heart rate control medications as ordered  - Initiate emergency measures for life threatening arrhythmias  - Monitor electrolytes and administer replacement therapy as ordered  Outcome: Progressing

## 2023-02-26 NOTE — PLAN OF CARE
Received patient, alert and oriented. Up and walking. Denied pain, denied SOB. Discussed POC. Due meds given. Monitored I&O. Reminded to use incentive spirometer 10x/hr while awake. Safety measures reinforced, call light within reach. Needs attended to. Will continue to monitor. Problem: CARDIOVASCULAR - ADULT  Goal: Maintains optimal cardiac output and hemodynamic stability  Description: INTERVENTIONS:  - Monitor vital signs, rhythm, and trends  - Monitor for bleeding, hypotension and signs of decreased cardiac output  - Evaluate effectiveness of vasoactive medications to optimize hemodynamic stability  - Monitor arterial and/or venous puncture sites for bleeding and/or hematoma  - Assess quality of pulses, skin color and temperature  - Assess for signs of decreased coronary artery perfusion - ex.  Angina  - Evaluate fluid balance, assess for edema, trend weights  Outcome: Progressing  Goal: Absence of cardiac arrhythmias or at baseline  Description: INTERVENTIONS:  - Continuous cardiac monitoring, monitor vital signs, obtain 12 lead EKG if indicated  - Evaluate effectiveness of antiarrhythmic and heart rate control medications as ordered  - Initiate emergency measures for life threatening arrhythmias  - Monitor electrolytes and administer replacement therapy as ordered  Outcome: Progressing

## 2023-02-27 VITALS
HEART RATE: 67 BPM | RESPIRATION RATE: 18 BRPM | HEIGHT: 72 IN | DIASTOLIC BLOOD PRESSURE: 80 MMHG | TEMPERATURE: 98 F | WEIGHT: 245.88 LBS | BODY MASS INDEX: 33.3 KG/M2 | SYSTOLIC BLOOD PRESSURE: 133 MMHG | OXYGEN SATURATION: 96 %

## 2023-02-27 LAB
BASOPHILS # BLD AUTO: 0.06 X10(3) UL (ref 0–0.2)
BASOPHILS NFR BLD AUTO: 0.7 %
EOSINOPHIL # BLD AUTO: 0.08 X10(3) UL (ref 0–0.7)
EOSINOPHIL NFR BLD AUTO: 0.9 %
ERYTHROCYTE [DISTWIDTH] IN BLOOD BY AUTOMATED COUNT: 11.9 %
GLUCOSE BLD-MCNC: 130 MG/DL (ref 70–99)
HCT VFR BLD AUTO: 35.7 %
HGB BLD-MCNC: 12.5 G/DL
IMM GRANULOCYTES # BLD AUTO: 0.07 X10(3) UL (ref 0–1)
IMM GRANULOCYTES NFR BLD: 0.8 %
LYMPHOCYTES # BLD AUTO: 1.93 X10(3) UL (ref 1–4)
LYMPHOCYTES NFR BLD AUTO: 21.7 %
MCH RBC QN AUTO: 31.3 PG (ref 26–34)
MCHC RBC AUTO-ENTMCNC: 35 G/DL (ref 31–37)
MCV RBC AUTO: 89.5 FL
MONOCYTES # BLD AUTO: 1.11 X10(3) UL (ref 0.1–1)
MONOCYTES NFR BLD AUTO: 12.5 %
NEUTROPHILS # BLD AUTO: 5.64 X10 (3) UL (ref 1.5–7.7)
NEUTROPHILS # BLD AUTO: 5.64 X10(3) UL (ref 1.5–7.7)
NEUTROPHILS NFR BLD AUTO: 63.4 %
PLATELET # BLD AUTO: 186 10(3)UL (ref 150–450)
RBC # BLD AUTO: 3.99 X10(6)UL
WBC # BLD AUTO: 8.9 X10(3) UL (ref 4–11)

## 2023-02-27 PROCEDURE — 99232 SBSQ HOSP IP/OBS MODERATE 35: CPT | Performed by: STUDENT IN AN ORGANIZED HEALTH CARE EDUCATION/TRAINING PROGRAM

## 2023-02-27 RX ORDER — TRAMADOL HYDROCHLORIDE 50 MG/1
50 TABLET ORAL EVERY 6 HOURS PRN
Qty: 60 TABLET | Refills: 0 | Status: SHIPPED | OUTPATIENT
Start: 2023-02-27

## 2023-02-27 RX ORDER — METOPROLOL SUCCINATE 100 MG/1
100 TABLET, EXTENDED RELEASE ORAL DAILY
Qty: 30 TABLET | Refills: 1 | Status: SHIPPED | OUTPATIENT
Start: 2023-02-27

## 2023-02-27 RX ORDER — METOPROLOL SUCCINATE 100 MG/1
100 TABLET, EXTENDED RELEASE ORAL
Status: DISCONTINUED | OUTPATIENT
Start: 2023-02-28 | End: 2023-02-27

## 2023-02-27 NOTE — PLAN OF CARE
Rec'd pt at 0730. A&O x 4. Tele shows NSR. O2 sats adequate on RA. Pt continent, up ad ayden. No C/O pain or SOB. Sternal and lt leg donor sites CDI, betadine applied, steri-strips in place. Pacer wires removed this AM. Bed locked and in low position, call light and personal items within reach. Will continue to monitor. POC - Stairs w/ PT this morning, home this afternoon. Problem: CARDIOVASCULAR - ADULT  Goal: Maintains optimal cardiac output and hemodynamic stability  Description: INTERVENTIONS:  - Monitor vital signs, rhythm, and trends  - Monitor for bleeding, hypotension and signs of decreased cardiac output  - Evaluate effectiveness of vasoactive medications to optimize hemodynamic stability  - Monitor arterial and/or venous puncture sites for bleeding and/or hematoma  - Assess quality of pulses, skin color and temperature  - Assess for signs of decreased coronary artery perfusion - ex.  Angina  - Evaluate fluid balance, assess for edema, trend weights  Outcome: Progressing  Goal: Absence of cardiac arrhythmias or at baseline  Description: INTERVENTIONS:  - Continuous cardiac monitoring, monitor vital signs, obtain 12 lead EKG if indicated  - Evaluate effectiveness of antiarrhythmic and heart rate control medications as ordered  - Initiate emergency measures for life threatening arrhythmias  - Monitor electrolytes and administer replacement therapy as ordered  Outcome: Progressing

## 2023-02-27 NOTE — PLAN OF CARE
Explained discharge instructions including medications and follow-up appointments to pt, verbalized understanding. IV removed. Tele monitor discontinued. Will be transported via wheelchair. Problem: CARDIOVASCULAR - ADULT  Goal: Maintains optimal cardiac output and hemodynamic stability  Description: INTERVENTIONS:  - Monitor vital signs, rhythm, and trends  - Monitor for bleeding, hypotension and signs of decreased cardiac output  - Evaluate effectiveness of vasoactive medications to optimize hemodynamic stability  - Monitor arterial and/or venous puncture sites for bleeding and/or hematoma  - Assess quality of pulses, skin color and temperature  - Assess for signs of decreased coronary artery perfusion - ex.  Angina  - Evaluate fluid balance, assess for edema, trend weights  2/27/2023 1212 by Beto Mares RN  Outcome: Adequate for Discharge  2/27/2023 1768 by Beto Mares RN  Outcome: Progressing  Goal: Absence of cardiac arrhythmias or at baseline  Description: INTERVENTIONS:  - Continuous cardiac monitoring, monitor vital signs, obtain 12 lead EKG if indicated  - Evaluate effectiveness of antiarrhythmic and heart rate control medications as ordered  - Initiate emergency measures for life threatening arrhythmias  - Monitor electrolytes and administer replacement therapy as ordered  2/27/2023 1212 by Beto Mares RN  Outcome: Adequate for Discharge  2/27/2023 0923 by Beto Mares RN  Outcome: Progressing

## 2023-02-27 NOTE — PLAN OF CARE
Received patient at 1. Patient A/O x 4. NSR on tele. O2 saturation 95% ON RA. Breath sounds clear/diminished. No C/O chest pain or SOB. Patient voiding with no issue. LBM 2/26. Walking in hallways indepedently with ease, no complaints of SOB, CP or discomfort. Mild midsternal incision pain noted, prn meds given with relief. Midsternal incisions and LLL x3 doner sites noted. Cleansed with betadine. Bed is locked and in low position. Call light and personal items within reach. All needs met at this time       Problem: CARDIOVASCULAR - ADULT  Goal: Maintains optimal cardiac output and hemodynamic stability  Description: INTERVENTIONS:  - Monitor vital signs, rhythm, and trends  - Monitor for bleeding, hypotension and signs of decreased cardiac output  - Evaluate effectiveness of vasoactive medications to optimize hemodynamic stability  - Monitor arterial and/or venous puncture sites for bleeding and/or hematoma  - Assess quality of pulses, skin color and temperature  - Assess for signs of decreased coronary artery perfusion - ex.  Angina  - Evaluate fluid balance, assess for edema, trend weights  Outcome: Progressing  Goal: Absence of cardiac arrhythmias or at baseline  Description: INTERVENTIONS:  - Continuous cardiac monitoring, monitor vital signs, obtain 12 lead EKG if indicated  - Evaluate effectiveness of antiarrhythmic and heart rate control medications as ordered  - Initiate emergency measures for life threatening arrhythmias  - Monitor electrolytes and administer replacement therapy as ordered  Outcome: Progressing

## 2023-02-28 ENCOUNTER — PATIENT OUTREACH (OUTPATIENT)
Dept: CASE MANAGEMENT | Age: 48
End: 2023-02-28

## 2023-02-28 DIAGNOSIS — Z02.9 ENCOUNTERS FOR ADMINISTRATIVE PURPOSE: ICD-10-CM

## 2023-03-02 NOTE — DISCHARGE SUMMARY
[facility]  Discharge Summary    Ponce Carey Patient Status:  Inpatient    1975 MRN [id number]   [facility] 8NE-A Attending No att. providers found   Hosp Day # 4 PCP Alice Max MD     Admit date: 2023    Discharge date and time: 2023  1:43 PM     Discharge Diagnoses:   CAD    Procedures Performed:   CABG    HPI & Hospital Course: Ponce Carey is a 50year old year old gentleman with CAD who was admitted to the hospital for CABG. Surgery was performed and recovery was uneventful. For more detailed information please see the medical record. Discharge Condition: Stable     Discharge Instructions:  Pt was instructed not to lift anything heavy and to follow up with Dr. Kiki Smith as directed.      Signed:  Dawn Radford MD  3/2/2023

## 2023-03-06 ENCOUNTER — OFFICE VISIT (OUTPATIENT)
Dept: FAMILY MEDICINE CLINIC | Facility: CLINIC | Age: 48
End: 2023-03-06
Payer: COMMERCIAL

## 2023-03-06 VITALS
BODY MASS INDEX: 33.05 KG/M2 | RESPIRATION RATE: 16 BRPM | WEIGHT: 244 LBS | SYSTOLIC BLOOD PRESSURE: 118 MMHG | DIASTOLIC BLOOD PRESSURE: 74 MMHG | TEMPERATURE: 98 F | HEART RATE: 64 BPM | OXYGEN SATURATION: 98 % | HEIGHT: 72 IN

## 2023-03-06 DIAGNOSIS — E11.9 TYPE 2 DIABETES MELLITUS WITHOUT COMPLICATION, WITHOUT LONG-TERM CURRENT USE OF INSULIN (HCC): ICD-10-CM

## 2023-03-06 DIAGNOSIS — G47.9 SLEEP DISTURBANCE: ICD-10-CM

## 2023-03-06 DIAGNOSIS — I25.118 CORONARY ARTERY DISEASE WITH OTHER FORM OF ANGINA PECTORIS, UNSPECIFIED VESSEL OR LESION TYPE, UNSPECIFIED WHETHER NATIVE OR TRANSPLANTED HEART (HCC): Primary | ICD-10-CM

## 2023-03-06 PROCEDURE — 3008F BODY MASS INDEX DOCD: CPT | Performed by: FAMILY MEDICINE

## 2023-03-06 PROCEDURE — 3078F DIAST BP <80 MM HG: CPT | Performed by: FAMILY MEDICINE

## 2023-03-06 PROCEDURE — 3074F SYST BP LT 130 MM HG: CPT | Performed by: FAMILY MEDICINE

## 2023-03-06 PROCEDURE — 99214 OFFICE O/P EST MOD 30 MIN: CPT | Performed by: FAMILY MEDICINE

## 2023-03-06 RX ORDER — ZOLPIDEM TARTRATE 10 MG/1
10 TABLET ORAL NIGHTLY
Qty: 30 TABLET | Refills: 3 | Status: SHIPPED | OUTPATIENT
Start: 2023-03-06 | End: 2024-02-29

## 2023-03-06 NOTE — PATIENT INSTRUCTIONS
Thank you for choosing Rm Roberts MD at [place] [address]  To Do: Mehnaz Sameera  1. Please take meds as directed. [facility] is located in [address]. Monday, Tuesday & Friday - 8 a.m. to 4 p.m. Wednesday, Thursday - 7 a.m. to 3 p.m. The lab is closed daily from 12 p.m.-12:30 p.m. Saturday lab hours by appointment. Call [phone] to schedule the appointment. Please signup for [employer], which is electronic access to your record if you have not done so. All your results will post on there. [email or web address]   You can NOW use [employer] to book your appointments with us, or consider using open access scheduling which is through the edward website [email or web address] and type in Rm Roberts MD and follow the links for \"Schedule Online Now\"    To schedule Imaging or tests at [facility] Scheduling [phone], Go to [facility] A ER Building (For example: CT scans, X rays, Ultrasound, MRI)  Cardiac Testing in ER building Building A second floor Cardiac Testing [phone] (For example: Holter Monitor, Cardiac Stress tests,Event Monitor, or 2D Echocardiograms)  [facility] call [phone] usually in Bldg A  Walk in Clinic in [place] at [facility]. [address] Mon-Fri at 8am-7:30 p.m., and Sat/Sun 9:00a. m.-4:30 p.m. Also at [address]  Call [phone] for info     Please call our office about any questions regarding your treatment/medicines/tests as a result of today's visit. For your safety, read the entire package insert of all medicines prescribed to you and be aware of all of the risks of treatment even beyond those discussed today. All therapies have potential risk of harm or side effects or medication interactions.   It is your duty and for your safety to discuss with the pharmacist and our office with questions, and to notify us and stop treatment if problems arise, but know that our intention is that the benefits outweigh those potential risks and we strive to make you healthier and to improve your quality of life. Referrals, and Radiology Information:    If your insurance requires a referral to a specialist, please allow 5 business days to process your referral request.    If Sanjuana Weinstein MD orders a CT or MRI, it may take up to 10 business days to receive approval from your insurance company. Once our office has called informing you that the insurance company approved your testing, please call Central Scheduling at [phone]  Please allow our office 5 business days to contact you regarding any testing results. Refill policies:   Allow 3 business days for refills; controlled substances may take longer and must be picked up from the office in person. Narcotic medications can only be filled in 30 day increments and must be refilled at an office visit only. If your prescription is due for a refill, you may be due for a follow-up appointment. We cannot refill your maintenance medications at a preventative wellness visit. To best provide you care, patients receiving maintenance medications need to be seen at least twice a year.

## 2023-03-09 ENCOUNTER — HOSPITAL ENCOUNTER (OUTPATIENT)
Dept: GENERAL RADIOLOGY | Facility: HOSPITAL | Age: 48
Discharge: HOME | End: 2023-03-09
Attending: THORACIC SURGERY (CARDIOTHORACIC VASCULAR SURGERY)
Payer: COMMERCIAL

## 2023-03-09 DIAGNOSIS — J90 PLEURAL EFFUSION: ICD-10-CM

## 2023-03-09 PROCEDURE — 71048 X-RAY EXAM CHEST 4+ VIEWS: CPT | Performed by: THORACIC SURGERY (CARDIOTHORACIC VASCULAR SURGERY)

## 2023-03-24 ENCOUNTER — ORDER TRANSCRIPTION (OUTPATIENT)
Dept: CARDIAC REHAB | Facility: HOSPITAL | Age: 48
End: 2023-03-24

## 2023-03-24 DIAGNOSIS — Z95.1 S/P CABG (CORONARY ARTERY BYPASS GRAFT): Primary | ICD-10-CM

## 2023-03-28 ENCOUNTER — CARDPULM VISIT (OUTPATIENT)
Dept: CARDIAC REHAB | Facility: HOSPITAL | Age: 48
End: 2023-03-28
Attending: INTERNAL MEDICINE
Payer: COMMERCIAL

## 2023-04-06 ENCOUNTER — CARDPULM VISIT (OUTPATIENT)
Dept: CARDIAC REHAB | Facility: HOSPITAL | Age: 48
End: 2023-04-06
Attending: INTERNAL MEDICINE
Payer: COMMERCIAL

## 2023-04-06 PROCEDURE — 93798 PHYS/QHP OP CAR RHAB W/ECG: CPT

## 2023-04-10 ENCOUNTER — CARDPULM VISIT (OUTPATIENT)
Dept: CARDIAC REHAB | Facility: HOSPITAL | Age: 48
End: 2023-04-10
Attending: INTERNAL MEDICINE
Payer: COMMERCIAL

## 2023-04-10 PROCEDURE — 93798 PHYS/QHP OP CAR RHAB W/ECG: CPT

## 2023-04-10 RX ORDER — ZOLPIDEM TARTRATE 10 MG/1
10 TABLET ORAL NIGHTLY
Qty: 30 TABLET | Refills: 3 | Status: SHIPPED | OUTPATIENT
Start: 2023-04-10 | End: 2024-04-04

## 2023-04-12 ENCOUNTER — CARDPULM VISIT (OUTPATIENT)
Dept: CARDIAC REHAB | Facility: HOSPITAL | Age: 48
End: 2023-04-12
Attending: INTERNAL MEDICINE
Payer: COMMERCIAL

## 2023-04-12 PROCEDURE — 93798 PHYS/QHP OP CAR RHAB W/ECG: CPT

## 2023-04-13 ENCOUNTER — CARDPULM VISIT (OUTPATIENT)
Dept: CARDIAC REHAB | Facility: HOSPITAL | Age: 48
End: 2023-04-13
Attending: INTERNAL MEDICINE
Payer: COMMERCIAL

## 2023-04-13 PROCEDURE — 93798 PHYS/QHP OP CAR RHAB W/ECG: CPT

## 2023-04-17 ENCOUNTER — CARDPULM VISIT (OUTPATIENT)
Dept: CARDIAC REHAB | Facility: HOSPITAL | Age: 48
End: 2023-04-17
Attending: INTERNAL MEDICINE
Payer: COMMERCIAL

## 2023-04-17 PROCEDURE — 93798 PHYS/QHP OP CAR RHAB W/ECG: CPT

## 2023-04-19 ENCOUNTER — CARDPULM VISIT (OUTPATIENT)
Dept: CARDIAC REHAB | Facility: HOSPITAL | Age: 48
End: 2023-04-19
Attending: INTERNAL MEDICINE
Payer: COMMERCIAL

## 2023-04-19 PROCEDURE — 93798 PHYS/QHP OP CAR RHAB W/ECG: CPT

## 2023-04-19 NOTE — CARDIAC REHAB
Patient exercising here in cardiac rehab session 6 post CABG. Upon check-in patient just states not feeling great. New LLE 1+ edema noted. Patient does not weigh regularly at home. Denies shortness of breath. Weight up 7# on our scale since he started the program.  Also Tele at rest sinus darren 40-50's - patient states his watch at home sometimes alerts because he drops below 40 on HR. Patient on Toprol XL 100mg daily. [facility] nurse called - she will contact APN & call patient back with recommendations.

## 2023-04-20 ENCOUNTER — APPOINTMENT (OUTPATIENT)
Dept: CARDIAC REHAB | Facility: HOSPITAL | Age: 48
End: 2023-04-20
Attending: INTERNAL MEDICINE
Payer: COMMERCIAL

## 2023-04-24 ENCOUNTER — APPOINTMENT (OUTPATIENT)
Dept: CARDIAC REHAB | Facility: HOSPITAL | Age: 48
End: 2023-04-24
Attending: INTERNAL MEDICINE
Payer: COMMERCIAL

## 2023-04-26 ENCOUNTER — APPOINTMENT (OUTPATIENT)
Dept: CARDIAC REHAB | Facility: HOSPITAL | Age: 48
End: 2023-04-26
Attending: INTERNAL MEDICINE
Payer: COMMERCIAL

## 2023-04-27 ENCOUNTER — APPOINTMENT (OUTPATIENT)
Dept: CARDIAC REHAB | Facility: HOSPITAL | Age: 48
End: 2023-04-27
Attending: INTERNAL MEDICINE
Payer: COMMERCIAL

## 2023-05-01 ENCOUNTER — CARDPULM VISIT (OUTPATIENT)
Dept: CARDIAC REHAB | Facility: HOSPITAL | Age: 48
End: 2023-05-01
Attending: INTERNAL MEDICINE
Payer: COMMERCIAL

## 2023-05-01 PROCEDURE — 93798 PHYS/QHP OP CAR RHAB W/ECG: CPT

## 2023-05-03 ENCOUNTER — CARDPULM VISIT (OUTPATIENT)
Dept: CARDIAC REHAB | Facility: HOSPITAL | Age: 48
End: 2023-05-03
Attending: INTERNAL MEDICINE
Payer: COMMERCIAL

## 2023-05-03 PROCEDURE — 93798 PHYS/QHP OP CAR RHAB W/ECG: CPT

## 2023-05-04 ENCOUNTER — CARDPULM VISIT (OUTPATIENT)
Dept: CARDIAC REHAB | Facility: HOSPITAL | Age: 48
End: 2023-05-04
Attending: INTERNAL MEDICINE
Payer: COMMERCIAL

## 2023-05-04 PROCEDURE — 93798 PHYS/QHP OP CAR RHAB W/ECG: CPT

## 2023-05-08 ENCOUNTER — APPOINTMENT (OUTPATIENT)
Dept: CARDIAC REHAB | Facility: HOSPITAL | Age: 48
End: 2023-05-08
Attending: INTERNAL MEDICINE
Payer: COMMERCIAL

## 2023-05-10 ENCOUNTER — CARDPULM VISIT (OUTPATIENT)
Dept: CARDIAC REHAB | Facility: HOSPITAL | Age: 48
End: 2023-05-10
Attending: INTERNAL MEDICINE
Payer: COMMERCIAL

## 2023-05-10 PROCEDURE — 93798 PHYS/QHP OP CAR RHAB W/ECG: CPT

## 2023-05-11 ENCOUNTER — APPOINTMENT (OUTPATIENT)
Dept: CARDIAC REHAB | Facility: HOSPITAL | Age: 48
End: 2023-05-11
Attending: INTERNAL MEDICINE
Payer: COMMERCIAL

## 2023-05-15 ENCOUNTER — APPOINTMENT (OUTPATIENT)
Dept: CARDIAC REHAB | Facility: HOSPITAL | Age: 48
End: 2023-05-15
Attending: INTERNAL MEDICINE
Payer: COMMERCIAL

## 2023-05-17 ENCOUNTER — APPOINTMENT (OUTPATIENT)
Dept: CARDIAC REHAB | Facility: HOSPITAL | Age: 48
End: 2023-05-17
Attending: INTERNAL MEDICINE
Payer: COMMERCIAL

## 2023-05-18 ENCOUNTER — APPOINTMENT (OUTPATIENT)
Dept: CARDIAC REHAB | Facility: HOSPITAL | Age: 48
End: 2023-05-18
Attending: INTERNAL MEDICINE
Payer: COMMERCIAL

## 2023-05-22 ENCOUNTER — CARDPULM VISIT (OUTPATIENT)
Dept: CARDIAC REHAB | Facility: HOSPITAL | Age: 48
End: 2023-05-22
Attending: INTERNAL MEDICINE
Payer: COMMERCIAL

## 2023-05-22 PROCEDURE — 93798 PHYS/QHP OP CAR RHAB W/ECG: CPT

## 2023-05-24 ENCOUNTER — CARDPULM VISIT (OUTPATIENT)
Dept: CARDIAC REHAB | Facility: HOSPITAL | Age: 48
End: 2023-05-24
Attending: INTERNAL MEDICINE
Payer: COMMERCIAL

## 2023-05-24 PROCEDURE — 93798 PHYS/QHP OP CAR RHAB W/ECG: CPT

## 2023-05-25 ENCOUNTER — CARDPULM VISIT (OUTPATIENT)
Dept: CARDIAC REHAB | Facility: HOSPITAL | Age: 48
End: 2023-05-25
Attending: INTERNAL MEDICINE
Payer: COMMERCIAL

## 2023-05-25 PROCEDURE — 93798 PHYS/QHP OP CAR RHAB W/ECG: CPT

## 2023-05-29 ENCOUNTER — APPOINTMENT (OUTPATIENT)
Dept: CARDIAC REHAB | Facility: HOSPITAL | Age: 48
End: 2023-05-29
Attending: INTERNAL MEDICINE
Payer: COMMERCIAL

## 2023-05-31 ENCOUNTER — APPOINTMENT (OUTPATIENT)
Dept: CARDIAC REHAB | Facility: HOSPITAL | Age: 48
End: 2023-05-31
Attending: INTERNAL MEDICINE
Payer: COMMERCIAL

## 2023-06-01 ENCOUNTER — APPOINTMENT (OUTPATIENT)
Dept: CARDIAC REHAB | Facility: HOSPITAL | Age: 48
End: 2023-06-01
Attending: INTERNAL MEDICINE
Payer: COMMERCIAL

## 2023-06-05 ENCOUNTER — APPOINTMENT (OUTPATIENT)
Dept: CARDIAC REHAB | Facility: HOSPITAL | Age: 48
End: 2023-06-05
Attending: INTERNAL MEDICINE
Payer: COMMERCIAL

## 2023-06-07 ENCOUNTER — APPOINTMENT (OUTPATIENT)
Dept: CARDIAC REHAB | Facility: HOSPITAL | Age: 48
End: 2023-06-07
Attending: INTERNAL MEDICINE
Payer: COMMERCIAL

## 2023-06-08 ENCOUNTER — APPOINTMENT (OUTPATIENT)
Dept: CARDIAC REHAB | Facility: HOSPITAL | Age: 48
End: 2023-06-08
Attending: INTERNAL MEDICINE
Payer: COMMERCIAL

## 2023-06-12 ENCOUNTER — APPOINTMENT (OUTPATIENT)
Dept: CARDIAC REHAB | Facility: HOSPITAL | Age: 48
End: 2023-06-12
Attending: INTERNAL MEDICINE
Payer: COMMERCIAL

## 2023-06-14 ENCOUNTER — APPOINTMENT (OUTPATIENT)
Dept: CARDIAC REHAB | Facility: HOSPITAL | Age: 48
End: 2023-06-14
Attending: INTERNAL MEDICINE
Payer: COMMERCIAL

## 2023-06-15 ENCOUNTER — APPOINTMENT (OUTPATIENT)
Dept: CARDIAC REHAB | Facility: HOSPITAL | Age: 48
End: 2023-06-15
Attending: INTERNAL MEDICINE
Payer: COMMERCIAL

## 2023-06-19 ENCOUNTER — APPOINTMENT (OUTPATIENT)
Dept: CARDIAC REHAB | Facility: HOSPITAL | Age: 48
End: 2023-06-19
Attending: INTERNAL MEDICINE
Payer: COMMERCIAL

## 2023-06-21 ENCOUNTER — APPOINTMENT (OUTPATIENT)
Dept: CARDIAC REHAB | Facility: HOSPITAL | Age: 48
End: 2023-06-21
Attending: INTERNAL MEDICINE
Payer: COMMERCIAL

## 2023-06-22 ENCOUNTER — APPOINTMENT (OUTPATIENT)
Dept: CARDIAC REHAB | Facility: HOSPITAL | Age: 48
End: 2023-06-22
Attending: INTERNAL MEDICINE
Payer: COMMERCIAL

## 2023-06-26 ENCOUNTER — APPOINTMENT (OUTPATIENT)
Dept: CARDIAC REHAB | Facility: HOSPITAL | Age: 48
End: 2023-06-26
Attending: INTERNAL MEDICINE
Payer: COMMERCIAL

## 2023-06-28 ENCOUNTER — APPOINTMENT (OUTPATIENT)
Dept: CARDIAC REHAB | Facility: HOSPITAL | Age: 48
End: 2023-06-28
Attending: INTERNAL MEDICINE
Payer: COMMERCIAL

## 2023-11-28 ENCOUNTER — OFFICE VISIT (OUTPATIENT)
Dept: FAMILY MEDICINE CLINIC | Facility: CLINIC | Age: 48
End: 2023-11-28
Payer: COMMERCIAL

## 2023-11-28 VITALS
WEIGHT: 244.38 LBS | HEART RATE: 78 BPM | DIASTOLIC BLOOD PRESSURE: 72 MMHG | OXYGEN SATURATION: 96 % | SYSTOLIC BLOOD PRESSURE: 114 MMHG | HEIGHT: 72 IN | BODY MASS INDEX: 33.1 KG/M2 | RESPIRATION RATE: 18 BRPM

## 2023-11-28 DIAGNOSIS — Z00.00 WELLNESS EXAMINATION: Primary | ICD-10-CM

## 2023-11-28 DIAGNOSIS — G47.9 SLEEP DISTURBANCE: ICD-10-CM

## 2023-11-28 PROCEDURE — 3078F DIAST BP <80 MM HG: CPT | Performed by: FAMILY MEDICINE

## 2023-11-28 PROCEDURE — 3074F SYST BP LT 130 MM HG: CPT | Performed by: FAMILY MEDICINE

## 2023-11-28 PROCEDURE — 3008F BODY MASS INDEX DOCD: CPT | Performed by: FAMILY MEDICINE

## 2023-11-28 PROCEDURE — 99396 PREV VISIT EST AGE 40-64: CPT | Performed by: FAMILY MEDICINE

## 2023-11-28 PROCEDURE — 99213 OFFICE O/P EST LOW 20 MIN: CPT | Performed by: FAMILY MEDICINE

## 2023-11-28 RX ORDER — FUROSEMIDE 20 MG/1
TABLET ORAL
COMMUNITY
Start: 2023-04-19 | End: 2023-11-28 | Stop reason: ALTCHOICE

## 2023-11-28 RX ORDER — ACETAMINOPHEN/DIPHENHYDRAMINE 500MG-25MG
TABLET ORAL
COMMUNITY
Start: 2023-04-19 | End: 2023-11-28 | Stop reason: ALTCHOICE

## 2023-11-28 RX ORDER — BACILLUS COAGULANS/INULIN 1B-250 MG
1 CAPSULE ORAL DAILY
COMMUNITY
Start: 2023-01-01

## 2023-11-28 RX ORDER — METOPROLOL SUCCINATE 50 MG/1
50 TABLET, EXTENDED RELEASE ORAL DAILY
COMMUNITY
Start: 2023-11-19

## 2023-11-28 RX ORDER — TRAZODONE HYDROCHLORIDE 50 MG/1
50 TABLET ORAL NIGHTLY PRN
Qty: 90 TABLET | Refills: 1 | Status: SHIPPED | OUTPATIENT
Start: 2023-11-28

## 2023-11-28 NOTE — PATIENT INSTRUCTIONS
Thank you for choosing Kimberli Bliss MD at [place] [address]  To Do: Faheem Rivers  1. Please see age appropriate health prevention below    [facility] is located in [address]. Monday, Tuesday & Friday - 8 a.m. to 4 p.m. Wednesday, Thursday - 7 a.m. to 3 p.m. The lab is closed daily from 12 p.m.-12:30 p.m. Saturday lab hours by appointment. Call [phone] to schedule the appointment. Please signup for [employer], which is electronic access to your record if you have not done so. All your results will post on there. [email or web address]   You can NOW use [employer] to book your appointments with us, or consider using open access scheduling which is through the edward website [email or web address] and type in Kimberli Bliss MD and follow the links for \"Schedule Online Now\"    To schedule Imaging or tests at [facility] Scheduling [phone], Go to [facility] A ER Building (For example: CT scans, X rays, Ultrasound, MRI)  Cardiac Testing in ER building Building A second floor Cardiac Testing [phone] (For example: Holter Monitor, Cardiac Stress tests,Event Monitor, or 2D Echocardiograms)  [facility] call [phone] usually in dg A  Walk in Clinic in [place] at [facility]. [address] Mon-Fri at 8am-7:30 p.m., and Sat/Sun 9:00a. m.-4:30 p.m. Also at [address]  Call [phone] for info     Please call our office about any questions regarding your treatment/medicines/tests as a result of today's visit. For your safety, read the entire package insert of all medicines prescribed to you and be aware of all of the risks of treatment even beyond those discussed today. All therapies have potential risk of harm or side effects or medication interactions.   It is your duty and for your safety to discuss with the pharmacist and our office with questions, and to notify us and stop treatment if problems arise, but know that our intention is that the benefits outweigh those potential risks and we strive to make you healthier and to improve your quality of life. Referrals, and Radiology Information:    If your insurance requires a referral to a specialist, please allow 5 business days to process your referral request.    If Kishan Tian MD orders a CT or MRI, it may take up to 10 business days to receive approval from your insurance company. Once our office has called informing you that the insurance company approved your testing, please call Central Scheduling at [phone]  Please allow our office 5 business days to contact you regarding any testing results. Refill policies:   Allow 3 business days for refills; controlled substances may take longer and must be picked up from the office in person. Narcotic medications can only be filled in 30 day increments and must be refilled at an office visit only. If your prescription is due for a refill, you may be due for a follow-up appointment. We cannot refill your maintenance medications at a preventative wellness visit. To best provide you care, patients receiving maintenance medications need to be seen at least twice a year.

## 2023-12-04 PROBLEM — E11.9 TYPE 2 DIABETES MELLITUS WITHOUT COMPLICATIONS (HCC): Status: ACTIVE | Noted: 2023-01-01

## 2023-12-04 PROBLEM — R60.0 EDEMA OF EXTREMITIES: Status: ACTIVE | Noted: 2023-04-19

## 2023-12-04 PROBLEM — Z87.891 PERSONAL HISTORY OF NICOTINE DEPENDENCE: Status: ACTIVE | Noted: 2022-01-01

## 2023-12-04 PROBLEM — I25.10 ATHSCL HEART DISEASE OF NATIVE CORONARY ARTERY W/O ANG PCTRS: Status: ACTIVE | Noted: 2023-02-01

## 2023-12-04 PROBLEM — Z60.2 PROBLEMS RELATED TO LIVING ALONE: Status: ACTIVE | Noted: 2023-01-01

## 2023-12-04 PROBLEM — G47.33 OBSTRUCTIVE SLEEP APNEA (ADULT) (PEDIATRIC): Status: ACTIVE | Noted: 2023-01-01

## 2023-12-04 PROBLEM — I27.20 PULMONARY HYPERTENSION, UNSPECIFIED (HCC): Status: ACTIVE | Noted: 2023-01-01

## 2023-12-04 PROBLEM — Z91.81 HISTORY OF FALLING: Status: ACTIVE | Noted: 2023-01-01

## 2023-12-04 PROBLEM — I10 ESSENTIAL HYPERTENSION: Status: ACTIVE | Noted: 2023-01-01

## 2023-12-04 PROBLEM — E78.00 PURE HYPERCHOLESTEROLEMIA, UNSPECIFIED: Status: ACTIVE | Noted: 2023-01-01

## 2023-12-04 PROBLEM — R07.9 CHEST PAIN, EXERTIONAL: Status: ACTIVE | Noted: 2022-10-18

## 2023-12-04 PROBLEM — Z79.82 LONG TERM (CURRENT) USE OF ASPIRIN: Status: ACTIVE | Noted: 2023-01-01

## 2023-12-04 PROBLEM — Z79.84 LONG TERM (CURRENT) USE OF ORAL HYPOGLYCEMIC DRUGS: Status: ACTIVE | Noted: 2023-01-01

## 2023-12-04 PROBLEM — Z95.1 PRESENCE OF AORTOCORONARY BYPASS GRAFT: Status: ACTIVE | Noted: 2023-01-01

## 2023-12-04 PROBLEM — Z48.812 ENCNTR FOR SURGICAL AFTCR FOLLOWING SURGERY ON THE CIRC SYS: Status: ACTIVE | Noted: 2023-01-01

## 2023-12-04 PROBLEM — Z87.01 PERSONAL HISTORY OF PNEUMONIA (RECURRENT): Status: ACTIVE | Noted: 2023-01-01

## 2023-12-04 PROBLEM — E66.9 OBESITY, UNSPECIFIED: Status: ACTIVE | Noted: 2023-01-01

## 2023-12-05 ENCOUNTER — TELEPHONE (OUTPATIENT)
Dept: FAMILY MEDICINE CLINIC | Facility: CLINIC | Age: 48
End: 2023-12-05

## 2023-12-05 NOTE — TELEPHONE ENCOUNTER
Recd fax from [address] [place] [address] for all recent lab results on pt. Faxed back fto [facility] at [phone] them to request labs from Dr. Mansoor North, Telly Snell, or Dr. Jose Franz, as there are lab results from these providers but none from Dr. Flora Seip.

## 2023-12-05 NOTE — TELEPHONE ENCOUNTER
Florecita Aden sent a cardiac clearance request for colonoscopy, date TBD. Patient had his physical last week, 11/28/23. Please advise if an appt is needed. Form placed in MA's folder.

## 2024-06-24 NOTE — TELEPHONE ENCOUNTER
Requesting Trazodone 50mg  Last OV: 11/28/23 Physical  RTC: 1 year  Last Rx'd 11/28/23 #90 with 1 refill    No future appointments.    Non-protocol med:  Rx pended and routed for approval/denial     Comment: Discussed if rash present to RTC Render Risk Assessment In Note?: no Detail Level: Simple

## 2024-06-25 RX ORDER — TRAZODONE HYDROCHLORIDE 50 MG/1
50 TABLET ORAL NIGHTLY PRN
Qty: 90 TABLET | Refills: 1 | Status: SHIPPED | OUTPATIENT
Start: 2024-06-25

## 2024-07-23 ENCOUNTER — PATIENT MESSAGE (OUTPATIENT)
Dept: FAMILY MEDICINE CLINIC | Facility: CLINIC | Age: 49
End: 2024-07-23

## 2024-07-23 DIAGNOSIS — E78.5 DYSLIPIDEMIA: ICD-10-CM

## 2024-07-23 DIAGNOSIS — Z12.5 SCREENING FOR MALIGNANT NEOPLASM OF PROSTATE: ICD-10-CM

## 2024-07-23 DIAGNOSIS — I25.10 CORONARY ARTERY DISEASE WITHOUT ANGINA PECTORIS, UNSPECIFIED VESSEL OR LESION TYPE, UNSPECIFIED WHETHER NATIVE OR TRANSPLANTED HEART: Primary | ICD-10-CM

## 2024-07-23 NOTE — TELEPHONE ENCOUNTER
From: Jabier Patel  To: Stephon Allen  Sent: 7/23/2024 4:39 PM CDT  Subject: PRESCRIPTION REFILL    Within the next week, i will be out of my cholesterol medicine. Can i please get a refill and can you get me blood test to see where my cholesterol is at? Thanks,

## 2024-07-24 RX ORDER — ROSUVASTATIN CALCIUM 40 MG/1
40 TABLET, COATED ORAL NIGHTLY
Qty: 90 TABLET | Refills: 3 | Status: SHIPPED | OUTPATIENT
Start: 2024-07-24

## 2024-07-25 ENCOUNTER — LAB ENCOUNTER (OUTPATIENT)
Dept: LAB | Age: 49
End: 2024-07-25
Attending: FAMILY MEDICINE
Payer: COMMERCIAL

## 2024-07-25 DIAGNOSIS — E78.5 DYSLIPIDEMIA: ICD-10-CM

## 2024-07-25 DIAGNOSIS — Z12.5 SCREENING FOR MALIGNANT NEOPLASM OF PROSTATE: ICD-10-CM

## 2024-07-25 LAB
ALBUMIN SERPL-MCNC: 4.8 G/DL (ref 3.2–4.8)
ALBUMIN/GLOB SERPL: 1.8 {RATIO} (ref 1–2)
ALP LIVER SERPL-CCNC: 68 U/L
ALT SERPL-CCNC: 33 U/L
ANION GAP SERPL CALC-SCNC: 6 MMOL/L (ref 0–18)
AST SERPL-CCNC: 30 U/L (ref ?–34)
BASOPHILS # BLD AUTO: 0.06 X10(3) UL (ref 0–0.2)
BASOPHILS NFR BLD AUTO: 0.7 %
BILIRUB SERPL-MCNC: 1.2 MG/DL (ref 0.3–1.2)
BUN BLD-MCNC: 16 MG/DL (ref 9–23)
CALCIUM BLD-MCNC: 9.8 MG/DL (ref 8.7–10.4)
CHLORIDE SERPL-SCNC: 104 MMOL/L (ref 98–112)
CHOLEST SERPL-MCNC: 125 MG/DL (ref ?–200)
CO2 SERPL-SCNC: 28 MMOL/L (ref 21–32)
COMPLEXED PSA SERPL-MCNC: 0.89 NG/ML (ref ?–4)
CREAT BLD-MCNC: 1.31 MG/DL
EGFRCR SERPLBLD CKD-EPI 2021: 66 ML/MIN/1.73M2 (ref 60–?)
EOSINOPHIL # BLD AUTO: 0.08 X10(3) UL (ref 0–0.7)
EOSINOPHIL NFR BLD AUTO: 0.9 %
ERYTHROCYTE [DISTWIDTH] IN BLOOD BY AUTOMATED COUNT: 11.9 %
FASTING PATIENT LIPID ANSWER: YES
FASTING STATUS PATIENT QL REPORTED: YES
GLOBULIN PLAS-MCNC: 2.7 G/DL (ref 2–3.5)
GLUCOSE BLD-MCNC: 87 MG/DL (ref 70–99)
HCT VFR BLD AUTO: 45.5 %
HDLC SERPL-MCNC: 34 MG/DL (ref 40–59)
HGB BLD-MCNC: 16.1 G/DL
IMM GRANULOCYTES # BLD AUTO: 0.03 X10(3) UL (ref 0–1)
IMM GRANULOCYTES NFR BLD: 0.3 %
LDLC SERPL CALC-MCNC: 66 MG/DL (ref ?–100)
LYMPHOCYTES # BLD AUTO: 2.77 X10(3) UL (ref 1–4)
LYMPHOCYTES NFR BLD AUTO: 32.3 %
MCH RBC QN AUTO: 31.4 PG (ref 26–34)
MCHC RBC AUTO-ENTMCNC: 35.4 G/DL (ref 31–37)
MCV RBC AUTO: 88.7 FL
MONOCYTES # BLD AUTO: 0.66 X10(3) UL (ref 0.1–1)
MONOCYTES NFR BLD AUTO: 7.7 %
NEUTROPHILS # BLD AUTO: 4.98 X10 (3) UL (ref 1.5–7.7)
NEUTROPHILS # BLD AUTO: 4.98 X10(3) UL (ref 1.5–7.7)
NEUTROPHILS NFR BLD AUTO: 58.1 %
NONHDLC SERPL-MCNC: 91 MG/DL (ref ?–130)
OSMOLALITY SERPL CALC.SUM OF ELEC: 287 MOSM/KG (ref 275–295)
PLATELET # BLD AUTO: 213 10(3)UL (ref 150–450)
POTASSIUM SERPL-SCNC: 4.1 MMOL/L (ref 3.5–5.1)
PROT SERPL-MCNC: 7.5 G/DL (ref 5.7–8.2)
RBC # BLD AUTO: 5.13 X10(6)UL
SODIUM SERPL-SCNC: 138 MMOL/L (ref 136–145)
T4 FREE SERPL-MCNC: 1.2 NG/DL (ref 0.8–1.7)
TRIGL SERPL-MCNC: 145 MG/DL (ref 30–149)
TSI SER-ACNC: 2.23 MIU/ML (ref 0.55–4.78)
VLDLC SERPL CALC-MCNC: 22 MG/DL (ref 0–30)
WBC # BLD AUTO: 8.6 X10(3) UL (ref 4–11)

## 2024-07-25 PROCEDURE — 84439 ASSAY OF FREE THYROXINE: CPT

## 2024-07-25 PROCEDURE — 84443 ASSAY THYROID STIM HORMONE: CPT

## 2024-07-25 PROCEDURE — 85025 COMPLETE CBC W/AUTO DIFF WBC: CPT

## 2024-07-25 PROCEDURE — 80053 COMPREHEN METABOLIC PANEL: CPT

## 2024-07-25 PROCEDURE — 80061 LIPID PANEL: CPT

## 2024-08-07 ENCOUNTER — PATIENT MESSAGE (OUTPATIENT)
Dept: FAMILY MEDICINE CLINIC | Facility: CLINIC | Age: 49
End: 2024-08-07

## 2024-08-07 RX ORDER — METOPROLOL SUCCINATE 50 MG/1
50 TABLET, EXTENDED RELEASE ORAL DAILY
Qty: 90 TABLET | Refills: 1 | Status: SHIPPED | OUTPATIENT
Start: 2024-08-07

## 2024-08-07 NOTE — TELEPHONE ENCOUNTER
From: Jabier Patel  To: Stephon Allen  Sent: 8/7/2024 12:35 PM CDT  Subject: PRESCRIPTION REFILL    I need a prescription refill on my blood pressure medication. Thank you,

## 2024-08-20 ENCOUNTER — HOSPITAL ENCOUNTER (OUTPATIENT)
Age: 49
Discharge: HOME | End: 2024-08-20
Payer: COMMERCIAL

## 2024-08-20 VITALS
HEIGHT: 71 IN | BODY MASS INDEX: 33.6 KG/M2 | SYSTOLIC BLOOD PRESSURE: 120 MMHG | OXYGEN SATURATION: 98 % | TEMPERATURE: 98 F | HEART RATE: 61 BPM | WEIGHT: 240 LBS | DIASTOLIC BLOOD PRESSURE: 86 MMHG | RESPIRATION RATE: 20 BRPM

## 2024-08-20 DIAGNOSIS — J06.9 VIRAL URI: Primary | ICD-10-CM

## 2024-08-20 LAB — SARS-COV-2 RNA RESP QL NAA+PROBE: NOT DETECTED

## 2024-08-20 PROCEDURE — U0002 COVID-19 LAB TEST NON-CDC: HCPCS | Performed by: NURSE PRACTITIONER

## 2024-08-20 PROCEDURE — 99203 OFFICE O/P NEW LOW 30 MIN: CPT | Performed by: NURSE PRACTITIONER

## 2024-08-20 RX ORDER — METHYLPREDNISOLONE 4 MG
TABLET, DOSE PACK ORAL
Qty: 1 EACH | Refills: 0 | Status: SHIPPED | OUTPATIENT
Start: 2024-08-20

## 2024-08-20 NOTE — ED PROVIDER NOTES
Patient Seen in: [facility]      History     Chief Complaint   Patient presents with    Sinus Problem     Sore throat, body chills. Fever - Entered by patient    Sore Throat    Fever    Cough/URI     Stated Complaint: Sinus Problem - Sore throat, body chills. Fever    Subjective:   49-year-old male presents today with URI symptoms sinus pressure congestion for 2 to 3 days.  Significant other had similar symptoms last week.  Denies any other symptoms or concerns.  The patient's medication list, past medical history and social history elements as listed in today's nurse's notes were reviewed and agreed (except as otherwise stated in the HPI).  The patient's family history reviewed and determined to be noncontributory to the presenting problem            Objective:   Past Medical History:    Allergic rhinitis    Arthritis    R shoulder    Atherosclerosis of coronary artery    Decorative tattoo    Dyslipidemia    H/O: pneumonia    High cholesterol    Hyperlipidemia    Leg swelling    After open heart surgery    Meningitis spinal (HCC)    MRSA infection    Obesity    Obesity (BMI 30-39.9)    JS (obstructive sleep apnea)    Not using cpap    Sleep apnea    Type 2 diabetes mellitus without complication, without long-term current use of insulin (HCC)    Vitamin D deficiency              Past Surgical History:   Procedure Laterality Date    Cabg  2023    Other surgical history      ear surgery as a child (5 years old)    Tonsillectomy                  Social History     Socioeconomic History    Marital status:    Tobacco Use    Smoking status: Former     Current packs/day: 0.00     Average packs/day: 1 pack/day for 25.0 years (25.0 ttl pk-yrs)     Types: Cigarettes     Start date: 1985     Quit date: 2010     Years since quittin.6     Passive exposure: Past    Smokeless tobacco: Current    Tobacco comments:     Uses Zynn nicotine pouches occasionally 1-2 per week   Vaping Use     Vaping status: Never Used   Substance and Sexual Activity    Alcohol use: No    Drug use: No   Other Topics Concern    Caffeine Concern No    Exercise Yes    Seat Belt Yes    Special Diet No    Stress Concern Yes    Weight Concern Yes     Social Determinants of Health      Received from [facility], [facility]    Social Connections              Review of Systems    Positive for stated Chief Complaint: Sinus Problem (Sore throat, body chills. Fever - Entered by patient), Sore Throat, Fever, and Cough/URI    Other systems are as noted in HPI.  Constitutional and vital signs reviewed.      All other systems reviewed and negative except as noted above.    Physical Exam     ED Triage Vitals [08/20/24 0914]   /86   Pulse 61   Resp 20   Temp 98.4 °F (36.9 °C)   Temp src Temporal   SpO2 98 %   O2 Device None (Room air)       Current Vitals:   Vital Signs  BP: 120/86  Pulse: 61  Resp: 20  Temp: 98.4 °F (36.9 °C)  Temp src: Temporal    Oxygen Therapy  SpO2: 98 %  O2 Device: None (Room air)            Physical Exam  Vitals and nursing note reviewed.   Constitutional:       Appearance: He is well-developed.   HENT:      Head: Normocephalic.      Right Ear: Tympanic membrane and ear canal normal.      Left Ear: Tympanic membrane and ear canal normal.      Nose: Mucosal edema and congestion present.      Mouth/Throat:      Pharynx: Uvula midline. Posterior oropharyngeal erythema present.   Eyes:      Conjunctiva/sclera: Conjunctivae normal.      Pupils: Pupils are equal, round, and reactive to light.   Cardiovascular:      Rate and Rhythm: Normal rate and regular rhythm.   Pulmonary:      Effort: Pulmonary effort is normal.      Breath sounds: Normal breath sounds.   Musculoskeletal:      Cervical back: Normal range of motion and neck supple.   Lymphadenopathy:      Cervical: No cervical adenopathy.   Skin:     General: Skin is warm and dry.   Neurological:      Mental Status: He is  alert and oriented to person, place, and time.               ED Course     Labs Reviewed   RAPID SARS-COV-2 BY PCR - Normal                      MDM     Please note that this report has been produced using speech recognition software and may contain errors related to that system including, but not limited to, errors in grammar, punctuation, and spelling, as well as words and phrases that possibly may have been recognized inappropriately.  If there are any questions or concerns, contact the dictating provider for clarification.                                         Medical Decision Making  Differential diagnosis includes but is not limited to: COVID-19, viral URI, strep throat, influenza, pneumonia, sinusitis, bronchitis      Patient presented today with URI symptoms with sinus pressure congestion.   Rapid COVID-19 test was negative.  Symptoms more likely due to a viral sinusitis.  Patient given prescription for Medrol Dosepak to take as directed.  Encouraged to continue pushing fluids rest.  Alternate Tylenol and Motrin for any fever pain.  Encouraged take over-the-counter antihistamine and cough suppressant as needed.  To follow-up with primary MD in 7-10 days if symptoms unimproved.  Patient verbalized understanding agree with plan of care.      Amount and/or Complexity of Data Reviewed  Labs: ordered. Decision-making details documented in ED Course.     Details: Rapid COVID-19    Risk  OTC drugs.  Prescription drug management.        Disposition and Plan     Clinical Impression:  1. Viral URI         Disposition:  Discharge  8/20/2024  9:59 am    Follow-up:  Stephon Allen MD  [address] [address]  [phone]    In 1 week  As needed          Medications Prescribed:  Current Discharge Medication List        START taking these medications    Details   methylPREDNISolone (MEDROL) 4 MG Oral Tablet Therapy Pack Dosepack: take as directed  Qty: 1 each, Refills: 0

## 2024-08-20 NOTE — ED INITIAL ASSESSMENT (HPI)
Pt with sorethroat 99.4 temp and nasal congestion and facial pain around eyes and bridge of nose sx onset Sunday.

## 2024-09-20 ENCOUNTER — APPOINTMENT (OUTPATIENT)
Dept: OTHER | Facility: HOSPITAL | Age: 49
End: 2024-09-20
Attending: NURSE PRACTITIONER

## 2024-10-19 RX ORDER — METOPROLOL SUCCINATE 50 MG/1
50 TABLET, EXTENDED RELEASE ORAL DAILY
Qty: 90 TABLET | Refills: 1 | Status: SHIPPED | OUTPATIENT
Start: 2024-10-19

## 2024-12-05 ENCOUNTER — OFFICE VISIT (OUTPATIENT)
Dept: FAMILY MEDICINE CLINIC | Facility: CLINIC | Age: 49
End: 2024-12-05
Payer: COMMERCIAL

## 2024-12-05 VITALS
HEIGHT: 71 IN | HEART RATE: 55 BPM | OXYGEN SATURATION: 98 % | TEMPERATURE: 98 F | BODY MASS INDEX: 35.33 KG/M2 | DIASTOLIC BLOOD PRESSURE: 80 MMHG | SYSTOLIC BLOOD PRESSURE: 138 MMHG | WEIGHT: 252.38 LBS | RESPIRATION RATE: 16 BRPM

## 2024-12-05 DIAGNOSIS — Z12.11 SCREEN FOR COLON CANCER: ICD-10-CM

## 2024-12-05 DIAGNOSIS — Z00.00 WELLNESS EXAMINATION: Primary | ICD-10-CM

## 2024-12-05 DIAGNOSIS — I25.118 CORONARY ARTERY DISEASE WITH OTHER FORM OF ANGINA PECTORIS, UNSPECIFIED VESSEL OR LESION TYPE, UNSPECIFIED WHETHER NATIVE OR TRANSPLANTED HEART (HCC): ICD-10-CM

## 2024-12-05 PROBLEM — E11.9 TYPE 2 DIABETES MELLITUS WITHOUT COMPLICATIONS (HCC): Status: RESOLVED | Noted: 2023-01-01 | Resolved: 2024-12-05

## 2024-12-05 PROCEDURE — 3075F SYST BP GE 130 - 139MM HG: CPT | Performed by: FAMILY MEDICINE

## 2024-12-05 PROCEDURE — 3079F DIAST BP 80-89 MM HG: CPT | Performed by: FAMILY MEDICINE

## 2024-12-05 PROCEDURE — 3008F BODY MASS INDEX DOCD: CPT | Performed by: FAMILY MEDICINE

## 2024-12-05 PROCEDURE — 99396 PREV VISIT EST AGE 40-64: CPT | Performed by: FAMILY MEDICINE

## 2024-12-05 RX ORDER — TRAZODONE HYDROCHLORIDE 50 MG/1
50 TABLET, FILM COATED ORAL NIGHTLY PRN
Qty: 90 TABLET | Refills: 1 | Status: SHIPPED | OUTPATIENT
Start: 2024-12-05

## 2024-12-05 NOTE — PROGRESS NOTES
Wellness Exam    REASON FOR VISIT:    Jabier Patel is a 49 year old male who presents for an Annual Health Assessment.    Current Complaints: Mr. Patel is here for his wellness exam  Flu Shot: see immunization record  Health Maintenance Topics with due status: Overdue       Topic Date Due    Diabetes Care Foot Exam Never done    Diabetes Care Dilated Eye Exam Never done    Colorectal Cancer Screening Never done    Diabetes Care: Microalb/Creat Ratio Never done    Pneumococcal Vaccine: Birth to 64yrs Never done    Zoster Vaccines Never done    Diabetes Care A1C 08/13/2023    Tobacco Cessation Counseling Never done    COVID-19 Vaccine 09/01/2024     Reported Health:   He is a very pleasant 49-year-old gentleman with history of hypertension, dyslipidemia, coronary disease status post CABG, gout, sleep apnea, BPH.  It was thought that he has diabetes mellitus while he was in the hospital but his glucose levels have been normal.  He is not taking any medications.  He feels well overall.  He will need a new cardiologist to see him.  He wants to do a colonoscopy and will need referral to gastroenterology.    I had reviewed past medical and family histories together with allergy and medication lists documented.    Details about the complaints:  N/A    General Health                                   At any time do you feel concerned for the safety/well-being of yourself and/or your children, in your home or elsewhere?: No     CAGE:     Cut: Have you ever felt you should Cut down on your drinking?: No    Annoyed: Have people Annoyed you by criticizing your drinking?: No    Guilty: Have you ever felt bad or Guilty about your drinking?: No    Eye Opener: Have you ever had a drink first thing in the morning to steady your nerves or to get rid of a hangover (Eye opener)?: No    Scoring  Total Score: 0     PHQ-4: Over the LAST 2 WEEKS       Depression Screening (PHQ-2/PHQ-9): Over the LAST 2 WEEKS                             PREVENTATIVE SERVICES  INDICATIONS AND SCHEDULE Recommendation Internal Lab or Procedure External Lab or Procedure   Colonoscopy Screen Every 10 years Health Maintenance   Topic Date Due    Colorectal Cancer Screening  Never done       Flex Sigmoidoscopy Screen  Every 5 years No results found for this or any previous visit.    Fecal Occult Blood  Annually No results found for: \"FOB\", \"OCCULTSTOOL\"    Obesity Screening Screen all adults annually Body mass index is 35.2 kg/m².      Preventive Services for Which Recommendations Vary with Risk Recommendation Internal Lab or Procedure External Lab or Procedure   Cholesterol Screening Recommended screening varies with age, risk and gender LDL Cholesterol (mg/dL)   Date Value   07/25/2024 66     LDL (POC) (mg/dl)   Date Value   08/19/2022 150 (A)       Diabetes Screening  If history of high blood pressure or other  risk factors HgbA1C (%)   Date Value   02/13/2023 6.7 (H)     Glucose (mg/dL)   Date Value   07/25/2024 87     Glucose (POC) (mg/dl)   Date Value   08/19/2022 204 (A)         Gonorrhea Screening If high risk No results found for: \"GONOCOCCUS\"    HIV Screening For all adults age 18-65, older adults at increased risk No results found for: \"HIV\"    Syphilis Screening Screen if pregnant or high risk No results found for: \"RPR\"    Hepatitis C Screening Screen those at high risk plus screen one time for adults born 1947-1 965 No results found for: \"HCVAB\"    Tuberculosis Screen If high risk No components found for: \"PPDINDURAT\"      ALLERGIES:   Allergies[1]    CURRENT MEDICATIONS:   Current Outpatient Medications   Medication Sig Dispense Refill    traZODone 50 MG Oral Tab Take 1 tablet (50 mg total) by mouth nightly as needed for Sleep. 90 tablet 1    metoprolol succinate ER 50 MG Oral Tablet 24 Hr Take 1 tablet (50 mg total) by mouth daily. 90 tablet 1    rosuvastatin 40 MG Oral Tab Take 1 tablet (40 mg total) by mouth nightly. 90 tablet 3    Bacillus  Coagulans-Inulin (PROBIOTIC) 1-250 BILLION-MG Oral Cap Take 1 capsule by mouth daily.      cetirizine 10 MG Oral Tab Take 1 tablet (10 mg total) by mouth daily.      aspirin 81 MG Oral Tab EC Take 1 tablet (81 mg total) by mouth daily.        MEDICAL INFORMATION:   Past Medical History:    Allergic rhinitis    Arthritis    R shoulder    Atherosclerosis of coronary artery    Decorative tattoo    Dyslipidemia    H/O: pneumonia    High cholesterol    Hyperlipidemia    Leg swelling    After open heart surgery    Meningitis spinal (HCC)    MRSA infection    Obesity    Obesity (BMI 30-39.9)    JS (obstructive sleep apnea)    Not using cpap    Sleep apnea    Type 2 diabetes mellitus without complication, without long-term current use of insulin (HCC)    Vitamin D deficiency      Past Surgical History:   Procedure Laterality Date    Cabg  2023    Other surgical history      ear surgery as a child (5 years old)    Tonsillectomy        Family History   Problem Relation Age of Onset    Other (Other[other]) Father         sleep apnea    Dementia Paternal Grandfather         98yrs old    Cancer Neg     Heart Disease Neg     Stroke Neg       SOCIAL HISTORY:   Social History     Socioeconomic History    Marital status:    Tobacco Use    Smoking status: Former     Current packs/day: 0.00     Average packs/day: 1 pack/day for 25.0 years (25.0 ttl pk-yrs)     Types: Cigarettes     Start date: 1985     Quit date: 2010     Years since quittin.9     Passive exposure: Past    Smokeless tobacco: Current    Tobacco comments:     Uses Zynn nicotine pouches occasionally 1-2 per week   Vaping Use    Vaping status: Never Used   Substance and Sexual Activity    Alcohol use: No    Drug use: Never   Other Topics Concern    Caffeine Concern No    Exercise Yes    Seat Belt Yes    Special Diet No    Stress Concern Yes    Weight Concern Yes     Social Drivers of Health      [facility],  [facility]    Social Connections          REVIEW OF SYSTEMS:   Constitutional: Negative for fever, chills and fatigue.   HENT: Negative for hearing loss, congestion, sore throat and neck pain.    Eyes: Negative for pain and visual disturbance.   Respiratory: Negative for cough and shortness of breath.    Cardiovascular: Negative for chest pain and palpitations.   Gastrointestinal: Negative for nausea, vomiting, abdominal pain and diarrhea.   Genitourinary: Negative for urgency, frequency and difficulty urinating.   Musculoskeletal: Negative for arthralgias and no gait problem.   Skin: Negative for color change and rash.   Neurological: Negative for tremors, weakness and numbness.   Hematological: Negative for adenopathy. Does not bruise/bleed easily.   Psychiatric/Behavioral: Negative for confusion and agitation. The patient is not nervous/anxious.      EXAM:   /80   Pulse 55   Temp 97.7 °F (36.5 °C) (Temporal)   Resp 16   Ht 5' 11\" (1.803 m)   Wt 252 lb 6 oz (114.5 kg)   SpO2 98%   BMI 35.20 kg/m²   Constitutional: He appears well-developed, nourished, and his stated age. Vital signs reviewed  Pleasant man   Head: Normocephalic and atraumatic.   Ear: TMs visible and normal bilaterally  Nose: Nose normal.   Eyes: EOM are normal. Pupils are equal, round, and reactive to light. No scleral icterus.   Neck: Normal range of motion. No thyromegaly present.   Cardiovascular: Normal rate, regular rhythm and normal heart sounds.  Exam reveals no friction rub.    No murmur heard.  Pulmonary/Chest: Effort normal and breath sounds normal. He has no wheezes. He has no rales.   Abdominal: Soft. Bowel sounds are normal. There is no tenderness.   Musculoskeletal: Normal range of motion. He exhibits no edema.   Lymphadenopathy:     He has no cervical adenopathy.   Neurological: He is alert and oriented to person, place, and time. He has normal reflexes.   Skin: Skin is warm. No rash noted. No  erythema. with normal hair  Psychiatric: He has a normal mood and affect. His behavior is normal.     ASSESSMENT AND OTHER RELEVANT CHRONIC CONDITIONS:   Jabier Patel is a 49 year old male who presents for an Annual Health Assessment.   1. Wellness examination    2. Coronary artery disease with other form of angina pectoris, unspecified vessel or lesion type, unspecified whether native or transplanted heart (HCC)    3. Screen for colon cancer        PLAN SUMMARY:   Jabier Patel is a 49 year old male  Age appropriate cancer screening, labs, safety, immunizations were discussed with the patient and ordered as follows:  Diagnoses and all orders for this visit:    Wellness examination  -     CBC W Differential W Platelet [E]; Future  -     Comp Metabolic Panel (14) [E]; Future  -     Lipid Panel [E]; Future  -     TSH and Free T4 [E]; Future  -     Hemoglobin A1C [E]; Future    Coronary artery disease with other form of angina pectoris, unspecified vessel or lesion type, unspecified whether native or transplanted heart (HCC)  -     Cardio Referral - Internal  -Stable and asymptomatic  - Referral to cardiology placed  - Continue current meds  Screen for colon cancer  -     Gastro Referral - In Network    Other orders  -     traZODone 50 MG Oral Tab; Take 1 tablet (50 mg total) by mouth nightly as needed for Sleep.  -     EHV/PPD PCP or Registry Removal Request    Encouraged him to do exercises with combination of cardio and strength exercises if able  Continue with current medication regimen  Will check routine labs and notify once we get test results and provide him recommendations thereafter.  Follow-up in 1 year as needed    This note was prepared using Dragon [employer] voice recognition dictation software. As a result errors may occur. When identified these errors have been corrected. While every attempt is made to correct errors during dictation discrepancies may still exist            Orders  Placed This Encounter   Procedures    CBC W Differential W Platelet [E]    Comp Metabolic Panel (14) [E]    Lipid Panel [E]    TSH and Free T4 [E]    Hemoglobin A1C [E]       Imaging & Consults:  GASTRO - INTERNAL  CARDIO - INTERNAL  [facility] PCP OR REGISTRY REMOVAL REQUEST    His 5 year prevention plan includes: annual visits for fasting labs  Health Maintenance   Topic Date Due    Diabetes Care Foot Exam  Never done    Diabetes Care Dilated Eye Exam  Never done    Colorectal Cancer Screening  Never done    Diabetes Care: Microalb/Creat Ratio  Never done    Pneumococcal Vaccine: Birth to 64yrs (1 of 2 - PCV) Never done    Zoster Vaccines (1 of 2) Never done    Diabetes Care A1C  08/13/2023    Tobacco Cessation Counseling  Never done    COVID-19 Vaccine (3 - 2024-25 season) 09/01/2024    Diabetes Care: GFR  07/25/2025    Annual Physical  12/05/2025    PSA  07/25/2026    DTaP,Tdap,and Td Vaccines (2 - Td or Tdap) 09/20/2034    Influenza Vaccine  Completed    Annual Depression Screening  Completed     Patient/Caregiver Education:  Patient/Caregiver Education: There are no barriers to learning. Medical education done.  Outcome: Patient verbalizes understanding.    Educated by: MD   The patient indicates understanding of these issues and agrees to the plan.    SUGGESTED VACCINATIONS - Influenza, Pneumococcal, Zoster, Tetanus     Immunization History   Administered Date(s) Administered    Covid-19 Vaccine Pfizer 30 mcg/0.3 ml 04/22/2021, 05/14/2021    Influenza 02/05/2008, 10/12/2022, 10/10/2024    TDAP 09/20/2024       Influenza Annually   Pneumococcal if high risk   Td/Tdap once then every 10 years   HPV Males 11-21   Zoster (Shingles) 60 and older: one dose   Varicella 2 doses if not immune   MMR 1-2 doses if born after 1958 and not immune     Patient Active Problem List   Diagnosis    JS (obstructive sleep apnea)    Gout    Blood pressure elevated without history of HTN    BPH (benign prostatic hyperplasia)     Dyslipidemia    Obesity (BMI 30-39.9)    Vitamin D deficiency    Therapeutic drug monitoring    Pulmonary HTN (HCC)    Mild ascending aorta dilatation (HCC)    CAD (coronary artery disease)    H/O: gout    Hypertension    Pleural effusion    Chest pain, exertional    Edema of extremities    Athscl heart disease of native coronary artery w/o ang pctrs    Body mass index (BMI) of 33.0-33.9 in adult    Essential hypertension    Personal history of pneumonia (recurrent)    Pure hypercholesterolemia, unspecified    Pulmonary hypertension, unspecified (HCC)    Personal history of nicotine dependence    Problems related to living alone    Presence of aortocoronary bypass graft    Obstructive sleep apnea (adult) (pediatric)    Encntr for surgical aftcr following surgery on the circ sys    History of falling    Long term (current) use of aspirin    Long term (current) use of oral hypoglycemic drugs    Obesity, unspecified     PREVENTIVE VISIT,EST,40-64         [1]   Allergies  Allergen Reactions    Bees ANAPHYLAXIS     Bee stings-has epinephrine pen    Vicodin [Hydrocodone-Acetaminophen] ITCHING     itching

## 2024-12-05 NOTE — PATIENT INSTRUCTIONS
Thank you for choosing Stephon Allen MD at [facility]  To Do: Jabier Patel  1. Please see age appropriate health prevention below     Call [phone] to schedule the appointment.   Please signup for Agent Panda, which is electronic access to your record if you have not done so.  All your results will post on there.  [email or web address]   You can NOW use Agent Panda to book your appointments with us, or consider using open access scheduling which is through the [place] website [email or web address] and type in Stephon Allen MD and follow the links for \"Schedule Online Now\"    To schedule Imaging or tests at [place] call Central Scheduling [phone], Go to [facility] A ER Building (For example: CT scans, X rays, Ultrasound, MRI)  Cardiac Testing in ER building Building A second floor Cardiac Testing [phone] (For example: Holter Monitor, Cardiac Stress tests,Event Monitor, or 2D Echocardiograms)  [facility] call [phone] usually in [facility]  Walk in Clinic in [place] at [address] Mon-Fri at 8am-7:30 p.m., and Sat/Sun 9:00a.m.-4:30 p.m.  Also at [address] [place] [address]  Call [phone] for info     Please call our office about any questions regarding your treatment/medicines/tests as a result of today's visit.  For your safety, read the entire package insert of all medicines prescribed to you and be aware of all of the risks of treatment even beyond those discussed today.  All therapies have potential risk of harm or side effects or medication interactions.  It is your duty and for your safety to discuss with the pharmacist and our office with questions, and to notify us and stop treatment if problems arise, but know that our intention is that the benefits outweigh those potential risks and we strive to make you healthier and to improve your quality of life.    Referrals, and Radiology Information:    If your insurance requires a referral to a specialist,  please allow 5 business days to process your referral request.    If tSephon Allen MD orders a CT or MRI, it may take up to 10 business days to receive approval from your insurance company. Once our office has called informing you that the insurance company approved your testing, please call Central Scheduling at [phone]  Please allow our office 5 business days to contact you regarding any testing results.    Refill policies:   Allow 3 business days for refills; controlled substances may take longer and must be picked up from the office in person.  Narcotic medications can only be filled in 30 day increments and must be refilled at an office visit only.  If your prescription is due for a refill, you may be due for a follow-up appointment.  We cannot refill your maintenance medications at a preventative wellness visit.  To best provide you care, patients receiving maintenance medications need to be seen at least twice a year.

## 2024-12-09 ENCOUNTER — LAB ENCOUNTER (OUTPATIENT)
Dept: LAB | Age: 49
End: 2024-12-09
Attending: FAMILY MEDICINE
Payer: COMMERCIAL

## 2024-12-09 DIAGNOSIS — Z00.00 WELLNESS EXAMINATION: ICD-10-CM

## 2024-12-09 LAB
ALBUMIN SERPL-MCNC: 4.6 G/DL (ref 3.2–4.8)
ALBUMIN/GLOB SERPL: 1.4 {RATIO} (ref 1–2)
ALP LIVER SERPL-CCNC: 66 U/L
ALT SERPL-CCNC: 34 U/L
ANION GAP SERPL CALC-SCNC: 8 MMOL/L (ref 0–18)
AST SERPL-CCNC: 30 U/L (ref ?–34)
BASOPHILS # BLD AUTO: 0.05 X10(3) UL (ref 0–0.2)
BASOPHILS NFR BLD AUTO: 0.7 %
BILIRUB SERPL-MCNC: 0.7 MG/DL (ref 0.3–1.2)
BUN BLD-MCNC: 18 MG/DL (ref 9–23)
CALCIUM BLD-MCNC: 10.6 MG/DL (ref 8.7–10.4)
CHLORIDE SERPL-SCNC: 106 MMOL/L (ref 98–112)
CHOLEST SERPL-MCNC: 122 MG/DL (ref ?–200)
CO2 SERPL-SCNC: 27 MMOL/L (ref 21–32)
CREAT BLD-MCNC: 1.2 MG/DL
EGFRCR SERPLBLD CKD-EPI 2021: 73 ML/MIN/1.73M2 (ref 60–?)
EOSINOPHIL # BLD AUTO: 0.09 X10(3) UL (ref 0–0.7)
EOSINOPHIL NFR BLD AUTO: 1.2 %
ERYTHROCYTE [DISTWIDTH] IN BLOOD BY AUTOMATED COUNT: 12.2 %
EST. AVERAGE GLUCOSE BLD GHB EST-MCNC: 128 MG/DL (ref 68–126)
FASTING PATIENT LIPID ANSWER: NO
FASTING STATUS PATIENT QL REPORTED: NO
GLOBULIN PLAS-MCNC: 3.2 G/DL (ref 2–3.5)
GLUCOSE BLD-MCNC: 89 MG/DL (ref 70–99)
HBA1C MFR BLD: 6.1 % (ref ?–5.7)
HCT VFR BLD AUTO: 50.5 %
HDLC SERPL-MCNC: 34 MG/DL (ref 40–59)
HGB BLD-MCNC: 16.8 G/DL
IMM GRANULOCYTES # BLD AUTO: 0.02 X10(3) UL (ref 0–1)
IMM GRANULOCYTES NFR BLD: 0.3 %
LDLC SERPL CALC-MCNC: 60 MG/DL (ref ?–100)
LYMPHOCYTES # BLD AUTO: 2.88 X10(3) UL (ref 1–4)
LYMPHOCYTES NFR BLD AUTO: 38.2 %
MCH RBC QN AUTO: 30.9 PG (ref 26–34)
MCHC RBC AUTO-ENTMCNC: 33.3 G/DL (ref 31–37)
MCV RBC AUTO: 92.8 FL
MONOCYTES # BLD AUTO: 0.78 X10(3) UL (ref 0.1–1)
MONOCYTES NFR BLD AUTO: 10.4 %
NEUTROPHILS # BLD AUTO: 3.71 X10 (3) UL (ref 1.5–7.7)
NEUTROPHILS # BLD AUTO: 3.71 X10(3) UL (ref 1.5–7.7)
NEUTROPHILS NFR BLD AUTO: 49.2 %
NONHDLC SERPL-MCNC: 88 MG/DL (ref ?–130)
OSMOLALITY SERPL CALC.SUM OF ELEC: 293 MOSM/KG (ref 275–295)
PLATELET # BLD AUTO: 217 10(3)UL (ref 150–450)
POTASSIUM SERPL-SCNC: 4.7 MMOL/L (ref 3.5–5.1)
PROT SERPL-MCNC: 7.8 G/DL (ref 5.7–8.2)
RBC # BLD AUTO: 5.44 X10(6)UL
SODIUM SERPL-SCNC: 141 MMOL/L (ref 136–145)
T4 FREE SERPL-MCNC: 1.1 NG/DL (ref 0.8–1.7)
TRIGL SERPL-MCNC: 166 MG/DL (ref 30–149)
TSI SER-ACNC: 1.74 UIU/ML (ref 0.55–4.78)
VLDLC SERPL CALC-MCNC: 25 MG/DL (ref 0–30)
WBC # BLD AUTO: 7.5 X10(3) UL (ref 4–11)

## 2024-12-09 PROCEDURE — 84439 ASSAY OF FREE THYROXINE: CPT | Performed by: FAMILY MEDICINE

## 2024-12-09 PROCEDURE — 80050 GENERAL HEALTH PANEL: CPT | Performed by: FAMILY MEDICINE

## 2024-12-09 PROCEDURE — 83036 HEMOGLOBIN GLYCOSYLATED A1C: CPT | Performed by: FAMILY MEDICINE

## 2024-12-09 PROCEDURE — 80061 LIPID PANEL: CPT | Performed by: FAMILY MEDICINE

## 2024-12-10 DIAGNOSIS — E11.9 DIABETES MELLITUS WITHOUT COMPLICATION (HCC): Primary | ICD-10-CM

## 2024-12-23 ENCOUNTER — PATIENT OUTREACH (OUTPATIENT)
Dept: CASE MANAGEMENT | Age: 49
End: 2024-12-23

## 2024-12-23 NOTE — PROCEDURES
The office order to remove patient from the diabetes registry is Approved and finalized on December 23, 2024.

## 2025-01-02 RX ORDER — TRAZODONE HYDROCHLORIDE 50 MG/1
50 TABLET, FILM COATED ORAL NIGHTLY PRN
Qty: 90 TABLET | Refills: 1 | OUTPATIENT
Start: 2025-01-02

## 2025-01-02 NOTE — TELEPHONE ENCOUNTER
Rx sent 12/5/24 #90 with 1 refill    Per [place] , 90 day supply was dispensed on 12/5/24.  Request denied as duplicate

## 2025-04-23 ENCOUNTER — HOSPITAL ENCOUNTER (OUTPATIENT)
Age: 50
Discharge: ANOTHER HOSPITAL | End: 2025-04-23
Payer: COMMERCIAL

## 2025-04-23 ENCOUNTER — APPOINTMENT (OUTPATIENT)
Dept: ULTRASOUND IMAGING | Age: 50
End: 2025-04-23
Attending: EMERGENCY MEDICINE
Payer: COMMERCIAL

## 2025-04-23 ENCOUNTER — HOSPITAL ENCOUNTER (EMERGENCY)
Age: 50
Discharge: HOME | End: 2025-04-23
Attending: EMERGENCY MEDICINE
Payer: COMMERCIAL

## 2025-04-23 VITALS
SYSTOLIC BLOOD PRESSURE: 148 MMHG | HEART RATE: 65 BPM | TEMPERATURE: 99 F | RESPIRATION RATE: 18 BRPM | DIASTOLIC BLOOD PRESSURE: 91 MMHG | OXYGEN SATURATION: 98 %

## 2025-04-23 VITALS
HEART RATE: 67 BPM | TEMPERATURE: 98 F | DIASTOLIC BLOOD PRESSURE: 72 MMHG | SYSTOLIC BLOOD PRESSURE: 123 MMHG | BODY MASS INDEX: 35 KG/M2 | OXYGEN SATURATION: 98 % | RESPIRATION RATE: 18 BRPM | WEIGHT: 252.44 LBS

## 2025-04-23 DIAGNOSIS — K80.50 BILIARY COLIC: Primary | ICD-10-CM

## 2025-04-23 DIAGNOSIS — R10.11 RUQ PAIN: Primary | ICD-10-CM

## 2025-04-23 DIAGNOSIS — K80.50 BILIARY COLIC: ICD-10-CM

## 2025-04-23 LAB
ALBUMIN SERPL-MCNC: 4.8 G/DL (ref 3.2–4.8)
ALBUMIN/GLOB SERPL: 1.8 {RATIO} (ref 1–2)
ALP LIVER SERPL-CCNC: 63 U/L (ref 45–117)
ALT SERPL-CCNC: 36 U/L (ref 10–49)
ANION GAP SERPL CALC-SCNC: 9 MMOL/L (ref 0–18)
AST SERPL-CCNC: 34 U/L (ref ?–34)
BASOPHILS # BLD AUTO: 0.05 X10(3) UL (ref 0–0.2)
BASOPHILS NFR BLD AUTO: 0.6 %
BILIRUB SERPL-MCNC: 0.7 MG/DL (ref 0.3–1.2)
BUN BLD-MCNC: 13 MG/DL (ref 9–23)
CALCIUM BLD-MCNC: 9.9 MG/DL (ref 8.7–10.6)
CHLORIDE SERPL-SCNC: 104 MMOL/L (ref 98–112)
CO2 SERPL-SCNC: 25 MMOL/L (ref 21–32)
CREAT BLD-MCNC: 1.18 MG/DL (ref 0.7–1.3)
EGFRCR SERPLBLD CKD-EPI 2021: 75 ML/MIN/1.73M2 (ref 60–?)
EOSINOPHIL # BLD AUTO: 0.02 X10(3) UL (ref 0–0.7)
EOSINOPHIL NFR BLD AUTO: 0.2 %
ERYTHROCYTE [DISTWIDTH] IN BLOOD BY AUTOMATED COUNT: 12 %
GLOBULIN PLAS-MCNC: 2.6 G/DL (ref 2–3.5)
GLUCOSE BLD-MCNC: 113 MG/DL (ref 70–99)
HCT VFR BLD AUTO: 44.7 % (ref 39–53)
HGB BLD-MCNC: 15.8 G/DL (ref 13–17.5)
IMM GRANULOCYTES # BLD AUTO: 0.02 X10(3) UL (ref 0–1)
IMM GRANULOCYTES NFR BLD: 0.2 %
LIPASE SERPL-CCNC: 50 U/L (ref 12–53)
LYMPHOCYTES # BLD AUTO: 2.12 X10(3) UL (ref 1–4)
LYMPHOCYTES NFR BLD AUTO: 23.8 %
MCH RBC QN AUTO: 31.4 PG (ref 26–34)
MCHC RBC AUTO-ENTMCNC: 35.3 G/DL (ref 31–37)
MCV RBC AUTO: 88.9 FL (ref 80–100)
MONOCYTES # BLD AUTO: 0.73 X10(3) UL (ref 0.1–1)
MONOCYTES NFR BLD AUTO: 8.2 %
NEUTROPHILS # BLD AUTO: 5.96 X10 (3) UL (ref 1.5–7.7)
NEUTROPHILS # BLD AUTO: 5.96 X10(3) UL (ref 1.5–7.7)
NEUTROPHILS NFR BLD AUTO: 67 %
OSMOLALITY SERPL CALC.SUM OF ELEC: 287 MOSM/KG (ref 275–295)
PLATELET # BLD AUTO: 257 10(3)UL (ref 150–450)
POTASSIUM SERPL-SCNC: 3.8 MMOL/L (ref 3.5–5.1)
PROT SERPL-MCNC: 7.4 G/DL (ref 5.7–8.2)
RBC # BLD AUTO: 5.03 X10(6)UL (ref 4.3–5.7)
SODIUM SERPL-SCNC: 138 MMOL/L (ref 136–145)
WBC # BLD AUTO: 8.9 X10(3) UL (ref 4–11)

## 2025-04-23 PROCEDURE — 83690 ASSAY OF LIPASE: CPT | Performed by: EMERGENCY MEDICINE

## 2025-04-23 PROCEDURE — 85025 COMPLETE CBC W/AUTO DIFF WBC: CPT | Performed by: EMERGENCY MEDICINE

## 2025-04-23 PROCEDURE — 99284 EMERGENCY DEPT VISIT MOD MDM: CPT

## 2025-04-23 PROCEDURE — 99215 OFFICE O/P EST HI 40 MIN: CPT | Performed by: NURSE PRACTITIONER

## 2025-04-23 PROCEDURE — 76700 US EXAM ABDOM COMPLETE: CPT | Performed by: EMERGENCY MEDICINE

## 2025-04-23 PROCEDURE — 36415 COLL VENOUS BLD VENIPUNCTURE: CPT

## 2025-04-23 PROCEDURE — 80053 COMPREHEN METABOLIC PANEL: CPT | Performed by: EMERGENCY MEDICINE

## 2025-04-23 PROCEDURE — 99285 EMERGENCY DEPT VISIT HI MDM: CPT

## 2025-04-23 RX ORDER — ONDANSETRON 2 MG/ML
4 INJECTION INTRAMUSCULAR; INTRAVENOUS ONCE
Status: DISCONTINUED | OUTPATIENT
Start: 2025-04-23 | End: 2025-04-23

## 2025-04-23 RX ORDER — ONDANSETRON 4 MG/1
4 TABLET, ORALLY DISINTEGRATING ORAL EVERY 4 HOURS PRN
Qty: 8 TABLET | Refills: 0 | Status: SHIPPED | OUTPATIENT
Start: 2025-04-23 | End: 2025-04-27

## 2025-04-23 NOTE — ED INITIAL ASSESSMENT (HPI)
RUQ abd pain woke him out of his sleep today. Pain is constant. Vomited x 3 today so far. Had a bm and was normal for him.

## 2025-04-23 NOTE — ED PROVIDER NOTES
Patient Seen in: [facility]    History     Chief Complaint   Patient presents with    Abdominal Pain     Stated Complaint: abdominal pain    HPI    Patient complains of RUQ  abdominal pain that began around 1:30am. Pt reports that the pain woke him from his sleep.    Pain described as sharp and radiates to and through the back.  + nausea, + vomiting X3 .  Pain rated as 8/10.  Patient reports that he had the same/similar episodes on Saturday, they seem to resolve themselves after about an hour.       Pt took gas x and pain medication without relief of symptoms    Past Medical History[1]    Past Surgical History[2]         Family History[3]    Short Social Hx on File[4]    Review of Systems    Positive for stated complaint: abdominal pain  Other systems are as noted in HPI.  Constitutional and vital signs reviewed.      All other systems reviewed and negative except as noted above.    PSFH elements reviewed from today and agreed except as otherwise stated in HPI.    Physical Exam     ED Triage Vitals [04/23/25 0849]   BP (!) 148/91   Pulse 65   Resp 18   Temp 98.7 °F (37.1 °C)   Temp src Oral   SpO2 98 %   O2 Device None (Room air)       Current:BP (!) 148/91   Pulse 65   Temp 98.7 °F (37.1 °C) (Oral)   Resp 18   SpO2 98%   Pulse ox 98% on RA         Physical Exam  General Appearance: alert, no distress  Eyes: pupils equal and round no pallor or injection  ENT, Mouth: mucous membranes moist  Respiratory: no respiratory distress noted  Gastrointestinal: soft, mild RUQ tender, no mass, normal bowel sounds, no swelling, no ecchymosis, no pain at mcburney's point, negative gallardo sign.  Skin: warm and dry, no rashes.  Musculoskeletal: neck is supple non tender        Extremities are symmetrical, full range of motion  Psychiatric: patient is pleasant, there is no agitation    DIFFERENTIAL DIAGNOSIS: After history and physical exam differential diagnosis was considered for GERD, gastritis, gastroenteritis,  cholecystitis           ED Course   Labs Reviewed - No data to display  I have personally  reviewed available prior medical records for any recent pertinent discharge summaries/testing. Patient/family updated on results and plan, a verbalized understanding and agreement with the plan.  I explained to the patient that emergent conditions may arise and to go to the ER for new, worsening or any persistent conditions. I've explained the importance of taking all medicatons as prescribed, follow up, and return precuations,  All questions answered.    Please note that this report has been produced using speech recognition software and may contain errors related to that system including, but not limited to, errors in grammar, punctuation, and spelling, as well as words and phrases that possibly may have been recognized inappropriately.  If there are any questions or concerns, contact the dictating provider for clarification.  MDM   Patient is a 49-year-old male who is here today with right upper quadrant pain and biliary colic started at approximately 130 this morning, woke him from his sleep.  Reports that he has had nausea and 3 episodes of vomiting.  He had the same/similar episodes approximately 1 week ago, lasted a few hours and then resolved by itself.  He reports that he is concerned because he is going out of town tomorrow for his honeymoon.  Patient has some mild right upper quadrant tenderness on exam however with his history and presentation of his right upper quadrant tenderness it is my recommendation that the patient be seen in the emergency department for liver enzymes and lipase.  I did discuss he should go straight to the emergency department, do not stop to eat or drink anything.      Patient was sent to the Emergency department for advanced labs and imaging that is not available here at the [facility]        Disposition and Plan     Clinical Impression:  1. RUQ pain    2. Biliary colic         Disposition:  Ic to ed    Follow-up:  Stephon Allen MD  [address]  [address]  [facility] [address]  [phone]            Medications Prescribed:  Discharge Medication List as of 4/23/2025  9:12 AM                         [1]   Past Medical History:   Allergic rhinitis    Arthritis    R shoulder    Atherosclerosis of coronary artery    Decorative tattoo    Dyslipidemia    H/O: pneumonia    High cholesterol    Hyperlipidemia    Leg swelling    After open heart surgery    Meningitis spinal (HCC)    MRSA infection    Obesity    Obesity (BMI 30-39.9)    JS (obstructive sleep apnea)    Not using cpap    Sleep apnea    Type 2 diabetes mellitus without complication, without long-term current use of insulin (HCC)    Vitamin D deficiency   [2]   Past Surgical History:  Procedure Laterality Date    Cabg  02/23/2023    Other surgical history      ear surgery as a child (5 years old)    Tonsillectomy     [3]   Family History  Problem Relation Age of Onset    Other (Other[other]) Father         sleep apnea    Dementia Paternal Grandfather         98yrs old    Cancer Neg     Heart Disease Neg     Stroke Neg    [4]   Social History  Socioeconomic History    Marital status:    Tobacco Use    Smoking status: Former     Current packs/day: 0.00     Average packs/day: 1 pack/day for 25.0 years (25.0 ttl pk-yrs)     Types: Cigarettes     Start date: 1/1/1985     Quit date: 1/1/2010     Years since quitting: 15.3     Passive exposure: Past    Smokeless tobacco: Current    Tobacco comments:     Uses Zynn nicotine pouches occasionally 1-2 per week   Vaping Use    Vaping status: Never Used   Substance and Sexual Activity    Alcohol use: No    Drug use: Never   Other Topics Concern    Caffeine Concern No    Exercise Yes    Seat Belt Yes    Special Diet No    Stress Concern Yes    Weight Concern Yes

## 2025-04-23 NOTE — ED PROVIDER NOTES
Patient Seen in: [facility] In [place]      History     Chief Complaint   Patient presents with    Abdomen/Flank Pain     Stated Complaint: abd pain sent by [facility]    Subjective:   HPI  Patient is a 49-year-old male with a history of hyperlipidemia, diabetes who presents to the ED sent in by immediate care for evaluation of right upper quadrant abdominal pain which woke him up from his sleep around 1:30 AM last night.  Patient tried to take Gas-X and Pepto-Bismol with no improvement.  Pain was sharp and radiated to his right back.  Reports associated nausea and felt like he had reflux.  Denies any significant vomiting but reports he sat up and had 1 episode of spitting up.  Patient also notes that he had a recent prior episode last weekend.  Both episodes resolved after 1 hour.  Patient denies any associated fevers, CP, SOB, constipation, diarrhea, urinary symptoms.       History of Present Illness               Objective:     Past Medical History:    Allergic rhinitis    Arthritis    R shoulder    Atherosclerosis of coronary artery    Decorative tattoo    Dyslipidemia    H/O: pneumonia    High cholesterol    Hyperlipidemia    Leg swelling    After open heart surgery    Meningitis spinal (HCC)    MRSA infection    Obesity    Obesity (BMI 30-39.9)    JS (obstructive sleep apnea)    Not using cpap    Sleep apnea    Type 2 diabetes mellitus without complication, without long-term current use of insulin (HCC)    Vitamin D deficiency              Past Surgical History:   Procedure Laterality Date    Cabg  02/23/2023    Other surgical history      ear surgery as a child (5 years old)    Tonsillectomy                  Social History     Socioeconomic History    Marital status:    Tobacco Use    Smoking status: Former     Current packs/day: 0.00     Average packs/day: 1 pack/day for 25.0 years (25.0 ttl pk-yrs)     Types: Cigarettes     Start date: 1/1/1985     Quit date: 1/1/2010     Years since  quitting: 15.3     Passive exposure: Past    Smokeless tobacco: Current    Tobacco comments:     Uses Zynn nicotine pouches occasionally 1-2 per week   Vaping Use    Vaping status: Never Used   Substance and Sexual Activity    Alcohol use: No    Drug use: Never   Other Topics Concern    Caffeine Concern No    Exercise Yes    Seat Belt Yes    Special Diet No    Stress Concern Yes    Weight Concern Yes                                Physical Exam     ED Triage Vitals [04/23/25 0943]   /83   Pulse 60   Resp 16   Temp 98.2 °F (36.8 °C)   Temp src Temporal   SpO2 97 %   O2 Device None (Room air)       Current Vitals:   Vital Signs  BP: 123/72  Pulse: 67  Resp: 18  Temp: 98.2 °F (36.8 °C)  Temp src: Temporal    Oxygen Therapy  SpO2: 98 %  O2 Device: None (Room air)        Physical Exam  Vitals and nursing note reviewed.   Constitutional:       General: He is not in acute distress.  HENT:      Head: Normocephalic and atraumatic.      Nose: Nose normal.      Mouth/Throat:      Mouth: Mucous membranes are moist.   Eyes:      Extraocular Movements: Extraocular movements intact.      Pupils: Pupils are equal, round, and reactive to light.   Cardiovascular:      Rate and Rhythm: Normal rate and regular rhythm.      Pulses: Normal pulses.   Pulmonary:      Effort: Pulmonary effort is normal. No respiratory distress.      Breath sounds: No wheezing.   Abdominal:      General: There is no distension.      Palpations: Abdomen is soft.      Tenderness: There is no abdominal tenderness. There is no right CVA tenderness or left CVA tenderness. Negative signs include Rodriguez's sign and McBurney's sign.   Musculoskeletal:         General: No swelling. Normal range of motion.      Cervical back: Normal range of motion.   Skin:     General: Skin is warm and dry.      Capillary Refill: Capillary refill takes less than 2 seconds.   Neurological:      General: No focal deficit present.      Mental Status: He is alert.   Psychiatric:          Mood and Affect: Mood normal.           Physical Exam                ED Course     Labs Reviewed   COMP METABOLIC PANEL (14) - Abnormal; Notable for the following components:       Result Value    Glucose 113 (*)     AST 34 (*)     All other components within normal limits   LIPASE - Normal   CBC WITH DIFFERENTIAL WITH PLATELET       ED Course as of 04/23/25 1755  ------------------------------------------------------------  Time: 04/23 1132  Comment: CONCLUSION:  Gallstones and sludge in the gallbladder with mild gallbladder wall thickening 3 mm, mild dilation, but without Rodriguez sign.  No biliary ductal dilation.  Correlate for any potential signs or symptoms of acute cholecystitis, consider HIDA   scan if needed.  Pancreas obscured by bowel gas.  No ascites.        Results                               MDM      Patient is a 49-year-old male with a history of hyperlipidemia, diabetes who presents to the ED sent in by immediate care for evaluation of right upper quadrant abdominal pain which woke him up from his sleep around 1:30 AM last night. VSS. Patient is well-appearing with no abdominal tenderness at this time.  No CVT.    Differential diagnosis includes not limited to cholecystitis, cholelithiasis, choledocholithiasis, pancreatitis, gastritis.    Labs obtained.  CBC shows no leukocytosis, normal hemoglobin.  CMP with AST of 34, otherwise unremarkable.  Lipase normal.    Right upper quadrant ultrasound independently reviewed which shows evidence of gallstones.  Formal radiology report shows gallstones and sludge in the gallbladder with mild gallbladder wall thickening of 3 mm, mild dilation but without Rodriguez sign.  No biliary ductal dilation.     At this time low suspicion for cholecystitis given no fever, no leukocytosis and pain has resolved.  Patient also has no abdominal tenderness in the emergency department.  I discussed results of workup and plan of care.  Recommended close follow-up with general  surgery.  Prescribed Zofran as needed for nausea or vomiting.  Offered Newcomb but patient declines and prefers to take Tylenol or ibuprofen as needed for pain.  He is tolerating p.o. in the ED.  Recommended avoiding fatty foods.  I also recommended close follow-up with PCP.  Patient verbalized understanding and agreement of plan.  He stable for discharge home at this time.      Medical Decision Making  Amount and/or Complexity of Data Reviewed  External Data Reviewed: notes.     Details: IC note  Labs: ordered. Decision-making details documented in ED Course.  Radiology: ordered and independent interpretation performed. Decision-making details documented in ED Course.    Risk  Prescription drug management.        Disposition and Plan     Clinical Impression:  1. Biliary colic         Disposition:  Discharge  4/23/2025 12:27 pm    Follow-up:  Kourtney Kwok MD  [address] [facility] [address]  [phone]    Schedule an appointment as soon as possible for a visit            Medications Prescribed:  Discharge Medication List as of 4/23/2025 12:29 PM        START taking these medications    Details   ondansetron 4 MG Oral Tablet Dispersible Take 1 tablet (4 mg total) by mouth every 4 (four) hours as needed for Nausea., Normal, Disp-8 tablet, R-0             Supplementary Documentation:

## 2025-04-23 NOTE — ED QUICK NOTES
Pt here w/ RUQ pain that woke him last night. Had healthy meals prior. Was tender to palpation, but improving. Saturday also had similar s/s but resolved after about 4 hours. Pt did vomit earlier today.

## 2025-04-23 NOTE — DISCHARGE INSTRUCTIONS
You were seen in the emergency department.     - Take Zofran as needed for nausea or vomiting.  -You can take Tylenol or ibuprofen as needed for pain.    - Take pepcid daily for heartburn  - Return to the ER if develop fevers, worsening pain, vomiting and you are unable to keep anything down, or any other new concerns or worsening symptoms.  - Your ultrasound shows evidence of gallstones.  Please follow-up closely with general surgery as outpatient.  Please also follow-up with your primary care physician for reevaluation.

## 2025-04-26 ENCOUNTER — APPOINTMENT (OUTPATIENT)
Dept: ULTRASOUND IMAGING | Facility: HOSPITAL | Age: 50
End: 2025-04-26
Attending: EMERGENCY MEDICINE
Payer: COMMERCIAL

## 2025-04-26 ENCOUNTER — HOSPITAL ENCOUNTER (OUTPATIENT)
Facility: HOSPITAL | Age: 50
Setting detail: OBSERVATION
Discharge: HOME | End: 2025-04-27
Attending: EMERGENCY MEDICINE | Admitting: INTERNAL MEDICINE
Payer: COMMERCIAL

## 2025-04-26 DIAGNOSIS — G89.18 POST-OPERATIVE PAIN: ICD-10-CM

## 2025-04-26 DIAGNOSIS — K81.0 ACUTE CHOLECYSTITIS: Primary | ICD-10-CM

## 2025-04-26 DIAGNOSIS — I10 ELEVATED BLOOD PRESSURE READING WITH DIAGNOSIS OF HYPERTENSION: ICD-10-CM

## 2025-04-26 PROBLEM — N17.9 AKI (ACUTE KIDNEY INJURY): Status: ACTIVE | Noted: 2017-02-01

## 2025-04-26 PROBLEM — E87.0 HYPERNATREMIA: Status: ACTIVE | Noted: 2025-04-26

## 2025-04-26 LAB
ALBUMIN SERPL-MCNC: 5.2 G/DL (ref 3.2–4.8)
ALBUMIN/GLOB SERPL: 1.9 {RATIO} (ref 1–2)
ALP LIVER SERPL-CCNC: 75 U/L (ref 45–117)
ALT SERPL-CCNC: 28 U/L (ref 10–49)
ANION GAP SERPL CALC-SCNC: 13 MMOL/L (ref 0–18)
ANTIBODY SCREEN: NEGATIVE
APTT PPP: 28.4 SECONDS (ref 23–36)
AST SERPL-CCNC: 35 U/L (ref ?–34)
BASOPHILS # BLD AUTO: 0.05 X10(3) UL (ref 0–0.2)
BASOPHILS NFR BLD AUTO: 0.7 %
BILIRUB SERPL-MCNC: 0.5 MG/DL (ref 0.3–1.2)
BUN BLD-MCNC: 20 MG/DL (ref 9–23)
CALCIUM BLD-MCNC: 10.1 MG/DL (ref 8.7–10.6)
CHLORIDE SERPL-SCNC: 107 MMOL/L (ref 98–112)
CO2 SERPL-SCNC: 26 MMOL/L (ref 21–32)
CREAT BLD-MCNC: 1.52 MG/DL (ref 0.7–1.3)
EGFRCR SERPLBLD CKD-EPI 2021: 55 ML/MIN/1.73M2 (ref 60–?)
EOSINOPHIL # BLD AUTO: 0.09 X10(3) UL (ref 0–0.7)
EOSINOPHIL NFR BLD AUTO: 1.3 %
ERYTHROCYTE [DISTWIDTH] IN BLOOD BY AUTOMATED COUNT: 12.1 %
GLOBULIN PLAS-MCNC: 2.7 G/DL (ref 2–3.5)
GLUCOSE BLD-MCNC: 112 MG/DL (ref 70–99)
HCT VFR BLD AUTO: 46.3 % (ref 39–53)
HGB BLD-MCNC: 15.9 G/DL (ref 13–17.5)
IMM GRANULOCYTES # BLD AUTO: 0.02 X10(3) UL (ref 0–1)
IMM GRANULOCYTES NFR BLD: 0.3 %
INR BLD: 1.08 (ref 0.8–1.2)
LIPASE SERPL-CCNC: 57 U/L (ref 12–53)
LYMPHOCYTES # BLD AUTO: 2.27 X10(3) UL (ref 1–4)
LYMPHOCYTES NFR BLD AUTO: 33.4 %
MCH RBC QN AUTO: 30.8 PG (ref 26–34)
MCHC RBC AUTO-ENTMCNC: 34.3 G/DL (ref 31–37)
MCV RBC AUTO: 89.6 FL (ref 80–100)
MONOCYTES # BLD AUTO: 0.78 X10(3) UL (ref 0.1–1)
MONOCYTES NFR BLD AUTO: 11.5 %
NEUTROPHILS # BLD AUTO: 3.58 X10 (3) UL (ref 1.5–7.7)
NEUTROPHILS # BLD AUTO: 3.58 X10(3) UL (ref 1.5–7.7)
NEUTROPHILS NFR BLD AUTO: 52.8 %
OSMOLALITY SERPL CALC.SUM OF ELEC: 305 MOSM/KG (ref 275–295)
PLATELET # BLD AUTO: 259 10(3)UL (ref 150–450)
POTASSIUM SERPL-SCNC: 4.4 MMOL/L (ref 3.5–5.1)
PROT SERPL-MCNC: 7.9 G/DL (ref 5.7–8.2)
PROTHROMBIN TIME: 14.1 SECONDS (ref 11.6–14.8)
RBC # BLD AUTO: 5.17 X10(6)UL (ref 4.3–5.7)
RH BLOOD TYPE: POSITIVE
SODIUM SERPL-SCNC: 146 MMOL/L (ref 136–145)
WBC # BLD AUTO: 6.8 X10(3) UL (ref 4–11)

## 2025-04-26 PROCEDURE — 76705 ECHO EXAM OF ABDOMEN: CPT | Performed by: EMERGENCY MEDICINE

## 2025-04-26 PROCEDURE — 99223 1ST HOSP IP/OBS HIGH 75: CPT | Performed by: INTERNAL MEDICINE

## 2025-04-26 RX ORDER — PROCHLORPERAZINE EDISYLATE 5 MG/ML
5 INJECTION INTRAMUSCULAR; INTRAVENOUS EVERY 8 HOURS PRN
Status: DISCONTINUED | OUTPATIENT
Start: 2025-04-26 | End: 2025-04-27

## 2025-04-26 RX ORDER — HEPARIN SODIUM 5000 [USP'U]/ML
5000 INJECTION, SOLUTION INTRAVENOUS; SUBCUTANEOUS EVERY 8 HOURS SCHEDULED
Status: DISCONTINUED | OUTPATIENT
Start: 2025-04-26 | End: 2025-04-27

## 2025-04-26 RX ORDER — METOPROLOL SUCCINATE 50 MG/1
50 TABLET, EXTENDED RELEASE ORAL
Status: DISCONTINUED | OUTPATIENT
Start: 2025-04-27 | End: 2025-04-27

## 2025-04-26 RX ORDER — HYDROMORPHONE HYDROCHLORIDE 1 MG/ML
0.4 INJECTION, SOLUTION INTRAMUSCULAR; INTRAVENOUS; SUBCUTANEOUS EVERY 2 HOUR PRN
Status: DISCONTINUED | OUTPATIENT
Start: 2025-04-26 | End: 2025-04-27

## 2025-04-26 RX ORDER — ONDANSETRON 2 MG/ML
4 INJECTION INTRAMUSCULAR; INTRAVENOUS ONCE
Status: COMPLETED | OUTPATIENT
Start: 2025-04-26 | End: 2025-04-26

## 2025-04-26 RX ORDER — TRAZODONE HYDROCHLORIDE 50 MG/1
50 TABLET ORAL NIGHTLY PRN
Status: DISCONTINUED | OUTPATIENT
Start: 2025-04-26 | End: 2025-04-27

## 2025-04-26 RX ORDER — HYDROMORPHONE HYDROCHLORIDE 1 MG/ML
0.2 INJECTION, SOLUTION INTRAMUSCULAR; INTRAVENOUS; SUBCUTANEOUS EVERY 2 HOUR PRN
Status: DISCONTINUED | OUTPATIENT
Start: 2025-04-26 | End: 2025-04-27

## 2025-04-26 RX ORDER — HYDROMORPHONE HYDROCHLORIDE 1 MG/ML
1 INJECTION, SOLUTION INTRAMUSCULAR; INTRAVENOUS; SUBCUTANEOUS ONCE
Refills: 0 | Status: COMPLETED | OUTPATIENT
Start: 2025-04-26 | End: 2025-04-26

## 2025-04-26 RX ORDER — ONDANSETRON 2 MG/ML
4 INJECTION INTRAMUSCULAR; INTRAVENOUS EVERY 6 HOURS PRN
Status: DISCONTINUED | OUTPATIENT
Start: 2025-04-26 | End: 2025-04-27

## 2025-04-26 RX ORDER — SODIUM CHLORIDE 9 MG/ML
INJECTION, SOLUTION INTRAVENOUS CONTINUOUS
Status: DISCONTINUED | OUTPATIENT
Start: 2025-04-26 | End: 2025-04-27

## 2025-04-26 RX ORDER — ACETAMINOPHEN 500 MG
500 TABLET ORAL EVERY 4 HOURS PRN
Status: DISCONTINUED | OUTPATIENT
Start: 2025-04-26 | End: 2025-04-27

## 2025-04-26 RX ORDER — HYDROMORPHONE HYDROCHLORIDE 1 MG/ML
0.8 INJECTION, SOLUTION INTRAMUSCULAR; INTRAVENOUS; SUBCUTANEOUS EVERY 2 HOUR PRN
Status: DISCONTINUED | OUTPATIENT
Start: 2025-04-26 | End: 2025-04-27

## 2025-04-26 RX ORDER — MORPHINE SULFATE 4 MG/ML
4 INJECTION, SOLUTION INTRAMUSCULAR; INTRAVENOUS ONCE
Status: COMPLETED | OUTPATIENT
Start: 2025-04-26 | End: 2025-04-26

## 2025-04-26 NOTE — ED INITIAL ASSESSMENT (HPI)
Pt to ER ambulatory, seen in [facility] ER and told had stones and sludge, was told could go to [place] for honeymoon for 9 days. Pain quickly worsening, took flight home today and came straight to ER. Pain 10/10, vomiting, chills, hunched over in triage.

## 2025-04-27 ENCOUNTER — ANESTHESIA (OUTPATIENT)
Dept: SURGERY | Facility: HOSPITAL | Age: 50
End: 2025-04-27
Payer: COMMERCIAL

## 2025-04-27 ENCOUNTER — ANESTHESIA EVENT (OUTPATIENT)
Dept: SURGERY | Facility: HOSPITAL | Age: 50
End: 2025-04-27
Payer: COMMERCIAL

## 2025-04-27 VITALS
HEIGHT: 71 IN | RESPIRATION RATE: 15 BRPM | SYSTOLIC BLOOD PRESSURE: 132 MMHG | BODY MASS INDEX: 34.3 KG/M2 | OXYGEN SATURATION: 96 % | DIASTOLIC BLOOD PRESSURE: 82 MMHG | TEMPERATURE: 97 F | WEIGHT: 245 LBS | HEART RATE: 65 BPM

## 2025-04-27 LAB
ALBUMIN SERPL-MCNC: 4.1 G/DL (ref 3.2–4.8)
ALBUMIN/GLOB SERPL: 2.3 {RATIO} (ref 1–2)
ALP LIVER SERPL-CCNC: 52 U/L (ref 45–117)
ALT SERPL-CCNC: 23 U/L (ref 10–49)
ANION GAP SERPL CALC-SCNC: 9 MMOL/L (ref 0–18)
AST SERPL-CCNC: 22 U/L (ref ?–34)
BASOPHILS # BLD AUTO: 0.04 X10(3) UL (ref 0–0.2)
BASOPHILS NFR BLD AUTO: 0.7 %
BILIRUB SERPL-MCNC: 0.6 MG/DL (ref 0.3–1.2)
BUN BLD-MCNC: 17 MG/DL (ref 9–23)
CALCIUM BLD-MCNC: 8.8 MG/DL (ref 8.7–10.6)
CHLORIDE SERPL-SCNC: 111 MMOL/L (ref 98–112)
CO2 SERPL-SCNC: 25 MMOL/L (ref 21–32)
CREAT BLD-MCNC: 1.37 MG/DL (ref 0.7–1.3)
EGFRCR SERPLBLD CKD-EPI 2021: 62 ML/MIN/1.73M2 (ref 60–?)
EOSINOPHIL # BLD AUTO: 0.12 X10(3) UL (ref 0–0.7)
EOSINOPHIL NFR BLD AUTO: 2 %
ERYTHROCYTE [DISTWIDTH] IN BLOOD BY AUTOMATED COUNT: 12.4 %
GLOBULIN PLAS-MCNC: 1.8 G/DL (ref 2–3.5)
GLUCOSE BLD-MCNC: 118 MG/DL (ref 70–99)
GLUCOSE BLD-MCNC: 130 MG/DL (ref 70–99)
HCT VFR BLD AUTO: 38.5 % (ref 39–53)
HGB BLD-MCNC: 13.4 G/DL (ref 13–17.5)
IMM GRANULOCYTES # BLD AUTO: 0.02 X10(3) UL (ref 0–1)
IMM GRANULOCYTES NFR BLD: 0.3 %
INR BLD: 1.08 (ref 0.8–1.2)
LYMPHOCYTES # BLD AUTO: 2.53 X10(3) UL (ref 1–4)
LYMPHOCYTES NFR BLD AUTO: 42.4 %
MCH RBC QN AUTO: 31.2 PG (ref 26–34)
MCHC RBC AUTO-ENTMCNC: 34.8 G/DL (ref 31–37)
MCV RBC AUTO: 89.7 FL (ref 80–100)
MONOCYTES # BLD AUTO: 0.52 X10(3) UL (ref 0.1–1)
MONOCYTES NFR BLD AUTO: 8.7 %
NEUTROPHILS # BLD AUTO: 2.74 X10 (3) UL (ref 1.5–7.7)
NEUTROPHILS # BLD AUTO: 2.74 X10(3) UL (ref 1.5–7.7)
NEUTROPHILS NFR BLD AUTO: 45.9 %
OSMOLALITY SERPL CALC.SUM OF ELEC: 303 MOSM/KG (ref 275–295)
PLATELET # BLD AUTO: 191 10(3)UL (ref 150–450)
POTASSIUM SERPL-SCNC: 4.3 MMOL/L (ref 3.5–5.1)
PROT SERPL-MCNC: 5.9 G/DL (ref 5.7–8.2)
PROTHROMBIN TIME: 14.1 SECONDS (ref 11.6–14.8)
RBC # BLD AUTO: 4.29 X10(6)UL (ref 4.3–5.7)
SODIUM SERPL-SCNC: 145 MMOL/L (ref 136–145)
WBC # BLD AUTO: 6 X10(3) UL (ref 4–11)

## 2025-04-27 PROCEDURE — 99203 OFFICE O/P NEW LOW 30 MIN: CPT | Performed by: STUDENT IN AN ORGANIZED HEALTH CARE EDUCATION/TRAINING PROGRAM

## 2025-04-27 PROCEDURE — 47562 LAPAROSCOPIC CHOLECYSTECTOMY: CPT | Performed by: STUDENT IN AN ORGANIZED HEALTH CARE EDUCATION/TRAINING PROGRAM

## 2025-04-27 PROCEDURE — 99239 HOSP IP/OBS DSCHRG MGMT >30: CPT | Performed by: HOSPITALIST

## 2025-04-27 RX ORDER — OXYCODONE HYDROCHLORIDE 5 MG/1
5 TABLET ORAL EVERY 6 HOURS PRN
Qty: 10 TABLET | Refills: 0 | Status: SHIPPED | OUTPATIENT
Start: 2025-04-27

## 2025-04-27 RX ORDER — LABETALOL HYDROCHLORIDE 5 MG/ML
5 INJECTION, SOLUTION INTRAVENOUS EVERY 5 MIN PRN
Status: DISCONTINUED | OUTPATIENT
Start: 2025-04-27 | End: 2025-04-27 | Stop reason: HOSPADM

## 2025-04-27 RX ORDER — NALOXONE HYDROCHLORIDE 0.4 MG/ML
0.08 INJECTION, SOLUTION INTRAMUSCULAR; INTRAVENOUS; SUBCUTANEOUS AS NEEDED
Status: DISCONTINUED | OUTPATIENT
Start: 2025-04-27 | End: 2025-04-27 | Stop reason: HOSPADM

## 2025-04-27 RX ORDER — HYDROMORPHONE HYDROCHLORIDE 1 MG/ML
0.4 INJECTION, SOLUTION INTRAMUSCULAR; INTRAVENOUS; SUBCUTANEOUS EVERY 5 MIN PRN
Status: DISCONTINUED | OUTPATIENT
Start: 2025-04-27 | End: 2025-04-27 | Stop reason: HOSPADM

## 2025-04-27 RX ORDER — MEPERIDINE HYDROCHLORIDE 25 MG/ML
12.5 INJECTION INTRAMUSCULAR; INTRAVENOUS; SUBCUTANEOUS AS NEEDED
Status: DISCONTINUED | OUTPATIENT
Start: 2025-04-27 | End: 2025-04-27 | Stop reason: HOSPADM

## 2025-04-27 RX ORDER — ACETAMINOPHEN 500 MG
1000 TABLET ORAL ONCE AS NEEDED
Status: DISCONTINUED | OUTPATIENT
Start: 2025-04-27 | End: 2025-04-27 | Stop reason: HOSPADM

## 2025-04-27 RX ORDER — ROCURONIUM BROMIDE 10 MG/ML
INJECTION, SOLUTION INTRAVENOUS AS NEEDED
Status: DISCONTINUED | OUTPATIENT
Start: 2025-04-27 | End: 2025-04-27 | Stop reason: SURG

## 2025-04-27 RX ORDER — HYDROMORPHONE HYDROCHLORIDE 1 MG/ML
0.6 INJECTION, SOLUTION INTRAMUSCULAR; INTRAVENOUS; SUBCUTANEOUS EVERY 5 MIN PRN
Status: DISCONTINUED | OUTPATIENT
Start: 2025-04-27 | End: 2025-04-27 | Stop reason: HOSPADM

## 2025-04-27 RX ORDER — KETOROLAC TROMETHAMINE 30 MG/ML
INJECTION, SOLUTION INTRAMUSCULAR; INTRAVENOUS AS NEEDED
Status: DISCONTINUED | OUTPATIENT
Start: 2025-04-27 | End: 2025-04-27 | Stop reason: SURG

## 2025-04-27 RX ORDER — PROCHLORPERAZINE EDISYLATE 5 MG/ML
5 INJECTION INTRAMUSCULAR; INTRAVENOUS EVERY 8 HOURS PRN
Status: DISCONTINUED | OUTPATIENT
Start: 2025-04-27 | End: 2025-04-27 | Stop reason: HOSPADM

## 2025-04-27 RX ORDER — LIDOCAINE HYDROCHLORIDE 10 MG/ML
INJECTION, SOLUTION EPIDURAL; INFILTRATION; INTRACAUDAL; PERINEURAL AS NEEDED
Status: DISCONTINUED | OUTPATIENT
Start: 2025-04-27 | End: 2025-04-27 | Stop reason: SURG

## 2025-04-27 RX ORDER — DIPHENHYDRAMINE HYDROCHLORIDE 50 MG/ML
12.5 INJECTION, SOLUTION INTRAMUSCULAR; INTRAVENOUS AS NEEDED
Status: DISCONTINUED | OUTPATIENT
Start: 2025-04-27 | End: 2025-04-27 | Stop reason: HOSPADM

## 2025-04-27 RX ORDER — MIDAZOLAM HYDROCHLORIDE 1 MG/ML
INJECTION INTRAMUSCULAR; INTRAVENOUS AS NEEDED
Status: DISCONTINUED | OUTPATIENT
Start: 2025-04-27 | End: 2025-04-27 | Stop reason: SURG

## 2025-04-27 RX ORDER — BUPIVACAINE HYDROCHLORIDE 2.5 MG/ML
INJECTION, SOLUTION EPIDURAL; INFILTRATION; INTRACAUDAL; PERINEURAL AS NEEDED
Status: DISCONTINUED | OUTPATIENT
Start: 2025-04-27 | End: 2025-04-27 | Stop reason: HOSPADM

## 2025-04-27 RX ORDER — NICOTINE POLACRILEX 4 MG
30 LOZENGE BUCCAL
Status: DISCONTINUED | OUTPATIENT
Start: 2025-04-27 | End: 2025-04-27 | Stop reason: HOSPADM

## 2025-04-27 RX ORDER — HYDROMORPHONE HYDROCHLORIDE 1 MG/ML
0.2 INJECTION, SOLUTION INTRAMUSCULAR; INTRAVENOUS; SUBCUTANEOUS EVERY 5 MIN PRN
Status: DISCONTINUED | OUTPATIENT
Start: 2025-04-27 | End: 2025-04-27 | Stop reason: HOSPADM

## 2025-04-27 RX ORDER — INDOCYANINE GREEN AND WATER 25 MG
5 KIT INJECTION ONCE
Status: COMPLETED | OUTPATIENT
Start: 2025-04-27 | End: 2025-04-27

## 2025-04-27 RX ORDER — NEOSTIGMINE METHYLSULFATE 1 MG/ML
INJECTION INTRAVENOUS AS NEEDED
Status: DISCONTINUED | OUTPATIENT
Start: 2025-04-27 | End: 2025-04-27 | Stop reason: SURG

## 2025-04-27 RX ORDER — SODIUM CHLORIDE, SODIUM LACTATE, POTASSIUM CHLORIDE, CALCIUM CHLORIDE 600; 310; 30; 20 MG/100ML; MG/100ML; MG/100ML; MG/100ML
INJECTION, SOLUTION INTRAVENOUS CONTINUOUS PRN
Status: DISCONTINUED | OUTPATIENT
Start: 2025-04-27 | End: 2025-04-27 | Stop reason: SURG

## 2025-04-27 RX ORDER — HYDROCODONE BITARTRATE AND ACETAMINOPHEN 5; 325 MG/1; MG/1
2 TABLET ORAL ONCE AS NEEDED
Status: DISCONTINUED | OUTPATIENT
Start: 2025-04-27 | End: 2025-04-27 | Stop reason: HOSPADM

## 2025-04-27 RX ORDER — DEXAMETHASONE SODIUM PHOSPHATE 4 MG/ML
VIAL (ML) INJECTION AS NEEDED
Status: DISCONTINUED | OUTPATIENT
Start: 2025-04-27 | End: 2025-04-27 | Stop reason: SURG

## 2025-04-27 RX ORDER — PHENYLEPHRINE HCL 10 MG/ML
VIAL (ML) INJECTION AS NEEDED
Status: DISCONTINUED | OUTPATIENT
Start: 2025-04-27 | End: 2025-04-27 | Stop reason: SURG

## 2025-04-27 RX ORDER — GLYCOPYRROLATE 0.2 MG/ML
INJECTION, SOLUTION INTRAMUSCULAR; INTRAVENOUS AS NEEDED
Status: DISCONTINUED | OUTPATIENT
Start: 2025-04-27 | End: 2025-04-27 | Stop reason: SURG

## 2025-04-27 RX ORDER — NICOTINE POLACRILEX 4 MG
15 LOZENGE BUCCAL
Status: DISCONTINUED | OUTPATIENT
Start: 2025-04-27 | End: 2025-04-27 | Stop reason: HOSPADM

## 2025-04-27 RX ORDER — HYDROMORPHONE HYDROCHLORIDE 1 MG/ML
INJECTION, SOLUTION INTRAMUSCULAR; INTRAVENOUS; SUBCUTANEOUS
Status: COMPLETED
Start: 2025-04-27 | End: 2025-04-27

## 2025-04-27 RX ORDER — ONDANSETRON 2 MG/ML
4 INJECTION INTRAMUSCULAR; INTRAVENOUS EVERY 6 HOURS PRN
Status: DISCONTINUED | OUTPATIENT
Start: 2025-04-27 | End: 2025-04-27 | Stop reason: HOSPADM

## 2025-04-27 RX ORDER — SODIUM CHLORIDE, SODIUM LACTATE, POTASSIUM CHLORIDE, CALCIUM CHLORIDE 600; 310; 30; 20 MG/100ML; MG/100ML; MG/100ML; MG/100ML
INJECTION, SOLUTION INTRAVENOUS CONTINUOUS
Status: DISCONTINUED | OUTPATIENT
Start: 2025-04-27 | End: 2025-04-27 | Stop reason: HOSPADM

## 2025-04-27 RX ORDER — ONDANSETRON 2 MG/ML
INJECTION INTRAMUSCULAR; INTRAVENOUS AS NEEDED
Status: DISCONTINUED | OUTPATIENT
Start: 2025-04-27 | End: 2025-04-27 | Stop reason: SURG

## 2025-04-27 RX ORDER — HYDROCODONE BITARTRATE AND ACETAMINOPHEN 5; 325 MG/1; MG/1
1 TABLET ORAL ONCE AS NEEDED
Status: DISCONTINUED | OUTPATIENT
Start: 2025-04-27 | End: 2025-04-27 | Stop reason: HOSPADM

## 2025-04-27 RX ORDER — DEXTROSE MONOHYDRATE 25 G/50ML
50 INJECTION, SOLUTION INTRAVENOUS
Status: DISCONTINUED | OUTPATIENT
Start: 2025-04-27 | End: 2025-04-27 | Stop reason: HOSPADM

## 2025-04-27 RX ORDER — ASPIRIN 81 MG/1
81 TABLET ORAL DAILY
Status: SHIPPED | COMMUNITY
Start: 2025-04-29

## 2025-04-27 RX ADMIN — ROCURONIUM BROMIDE 40 MG: 10 INJECTION, SOLUTION INTRAVENOUS at 09:48:00

## 2025-04-27 RX ADMIN — ONDANSETRON 4 MG: 2 INJECTION INTRAMUSCULAR; INTRAVENOUS at 10:25:00

## 2025-04-27 RX ADMIN — MIDAZOLAM HYDROCHLORIDE 2 MG: 1 INJECTION INTRAMUSCULAR; INTRAVENOUS at 09:37:00

## 2025-04-27 RX ADMIN — KETOROLAC TROMETHAMINE 30 MG: 30 INJECTION, SOLUTION INTRAMUSCULAR; INTRAVENOUS at 10:25:00

## 2025-04-27 RX ADMIN — DEXAMETHASONE SODIUM PHOSPHATE 8 MG: 4 MG/ML VIAL (ML) INJECTION at 10:00:00

## 2025-04-27 RX ADMIN — GLYCOPYRROLATE 0.6 MG: 0.2 INJECTION, SOLUTION INTRAMUSCULAR; INTRAVENOUS at 10:35:00

## 2025-04-27 RX ADMIN — PHENYLEPHRINE HCL 100 MCG: 10 MG/ML VIAL (ML) INJECTION at 10:15:00

## 2025-04-27 RX ADMIN — SODIUM CHLORIDE, SODIUM LACTATE, POTASSIUM CHLORIDE, CALCIUM CHLORIDE: 600; 310; 30; 20 INJECTION, SOLUTION INTRAVENOUS at 09:40:00

## 2025-04-27 RX ADMIN — NEOSTIGMINE METHYLSULFATE 4 MG: 1 INJECTION INTRAVENOUS at 10:35:00

## 2025-04-27 RX ADMIN — LIDOCAINE HYDROCHLORIDE 25 MG: 10 INJECTION, SOLUTION EPIDURAL; INFILTRATION; INTRACAUDAL; PERINEURAL at 09:47:00

## 2025-04-27 NOTE — PROGRESS NOTES
[facility]   [facility]     Jabier Patel Patient Status:  Observation    1975 MRN [id number]   Location [facility] POST ANESTHESIA CARE UNIT Attending Nic Sherwood DO   Hosp Day # 0 PCP Stephon Allen MD     Chief Complaint: Abdominal pain    Subjective:     Patient seen and examined in PACU. Mild right abdominal soreness. Denies N/V. Denies CP.     Objective:    Review of Systems:   A comprehensive review of systems was completed; pertinent positive and negatives stated in subjective.    Vital signs:  Temp:  [97.1 °F (36.2 °C)-97.9 °F (36.6 °C)] 97.2 °F (36.2 °C)  Pulse:  [36-82] 60  Resp:  [9-19] 18  BP: (103-171)/() 141/82  SpO2:  [94 %-100 %] 100 %    Physical Exam:    General: No acute distress  Respiratory: No wheezes, no rhonchi  Cardiovascular: S1, S2, regular rate and rhythm  Abdomen: Soft, mild incisional site tenderness, non-distended, positive bowel sounds  Neuro: No new focal deficits.   Extremities: No edema    Diagnostic Data:    Labs:  Recent Labs   Lab 25  1005 25  1823 25  1948 25  0532 25  0533   WBC 8.9 6.8  --  6.0  --    HGB 15.8 15.9  --  13.4  --    MCV 88.9 89.6  --  89.7  --    .0 259.0  --  191.0  --    INR  --   --  1.08  --  1.08       Recent Labs   Lab 25  1005 25  1823 25  0533   * 112* 130*   BUN 13 20 17   CREATSERUM 1.18 1.52* 1.37*   CA 9.9 10.1 8.8   ALB 4.8 5.2* 4.1    146* 145   K 3.8 4.4 4.3    107 111   CO2 25.0 26.0 25.0   ALKPHO 63 75 52   AST 34* 35* 22   ALT 36 28 23   BILT 0.7 0.5 0.6   TP 7.4 7.9 5.9       Estimated Glomerular Filtration Rate: 62 mL/min/1.73m2 (result from lab).    No results for input(s): \"TROP\", \"TROPHS\", \"CK\" in the last 168 hours.    Recent Labs   Lab 25  0533   PTP 14.1 14.1   INR 1.08 1.08                  Microbiology    No results found for this visit on  04/26/25.      Imaging: Reviewed in Epic.    Medications: Scheduled Medications[1]    Assessment & Plan:      #Acute Cholecystitis   S/p lap lupe 4/27  Anti-emetics, pain control      #Acute Kidney Injury  #Hypernatremia  Improved with IVF     #CAD with prior CABG  Resume ASA when ok with surgery  BB/statin    #Obesity  -Body mass index is 34.17 kg/m².    #Disposition  Possible DC home later today if tolerating diet and pain controlled    Nic Sherwood DO    Supplementary Documentation:     Quality:  DVT Mechanical Prophylaxis:   SCDs,    DVT Pharmacologic Prophylaxis   Medication    [Transfer Hold] heparin (Porcine) 5000 UNIT/ML injection 5,000 Units         DVT Pharmacologic prophylaxis: Aspirin 81 mg      Code Status: Not on file  Jiang: No urinary catheter in place  Jiang Duration (in days):   Central line:    NISHANT: 4/27/2025    Discharge is dependent on: clinical progress  At this point Mr. Patel is expected to be discharge to: home    The 21st Century Cures Act makes medical notes like these available to patients in the interest of transparency. Please be advised this is a medical document. Medical documents are intended to carry relevant information, facts as evident, and the clinical opinion of the practitioner. The medical note is intended as peer to peer communication and may appear blunt or direct. It is written in medical language and may contain abbreviations or verbiage that are unfamiliar.                         [1]    [Transfer Hold] metoprolol succinate ER  50 mg Oral Daily Beta Blocker    [Transfer Hold] heparin  5,000 Units Subcutaneous Q8H GRICELDA    piperacillin-tazobactam  3.375 g Intravenous Q8H

## 2025-04-27 NOTE — ANESTHESIA PREPROCEDURE EVALUATION
PRE-OP EVALUATION    Patient Name: Jabier Patel    Admit Diagnosis: Acute cholecystitis [K81.0]  Elevated blood pressure reading with diagnosis of hypertension [I10]    Pre-op Diagnosis: Acute cholecystitis [K81.0]    LAPAROSCOPIC CHOLECYSTECTOMY WITH INDOCYANINE GREEN INJECTION POSSIBLE OPEN    Anesthesia Procedure: LAPAROSCOPIC CHOLECYSTECTOMY WITH INDOCYANINE GREEN INJECTION POSSIBLE OPEN    Surgeons and Role:     * Kt Luu MD - Primary    Pre-op vitals reviewed.  Temp: 97.9 °F (36.6 °C)  Pulse: 36  Resp: 16  BP: 120/69  SpO2: 99 %  Body mass index is 34.17 kg/m².    Current medications reviewed.  Hospital Medications:  Current Medications[1]    Outpatient Medications:   Prescriptions Prior to Admission[2]    Allergies: Bees and Vicodin [hydrocodone-acetaminophen]      Anesthesia Evaluation    Patient summary reviewed.    Anesthetic Complications  (-) history of anesthetic complications         GI/Hepatic/Renal    Negative GI/hepatic/renal ROS.                             Cardiovascular                (+) obesity  (+) hypertension   (+) hyperlipidemia  (+) CAD    (+) CABG/stent                            Endo/Other                           (+) arthritis       Pulmonary                    (+) sleep apnea and CPAP      Neuro/Psych    Negative neuro/psych ROS.                                  Past Surgical History[3]  Social Hx on file[4]  History   Drug Use Unknown     Available pre-op labs reviewed.  Lab Results   Component Value Date    WBC 6.0 04/27/2025    RBC 4.29 (L) 04/27/2025    HGB 13.4 04/27/2025    HCT 38.5 (L) 04/27/2025    MCV 89.7 04/27/2025    MCH 31.2 04/27/2025    MCHC 34.8 04/27/2025    RDW 12.4 04/27/2025    .0 04/27/2025     Lab Results   Component Value Date     04/27/2025    K 4.3 04/27/2025     04/27/2025    CO2 25.0 04/27/2025    BUN 17 04/27/2025    CREATSERUM 1.37 (H) 04/27/2025     (H) 04/27/2025    CA 8.8 04/27/2025     Lab Results   Component  Value Date    INR 1.08 04/27/2025         Airway      Mallampati: II  Mouth opening: >3 FB  TM distance: 4 - 6 cm  Neck ROM: full Cardiovascular    Cardiovascular exam normal.         Dental             Pulmonary    Pulmonary exam normal.                 Other findings              ASA: 3   Plan: general  NPO status verified and           Plan/risks discussed with: patient and spouse  Use of blood product(s) discussed with: patient and spouse    Consented to blood products.          Present on Admission:   YE (acute kidney injury)   Benign essential HTN             [1]    indocyanine green (IC-Green) injection 5 mg  5 mg Intravenous Once    [COMPLETED] sodium chloride 0.9 % IV bolus 1,000 mL  1,000 mL Intravenous Once    [COMPLETED] ondansetron (Zofran) 4 MG/2ML injection 4 mg  4 mg Intravenous Once    [COMPLETED] morphINE PF 4 MG/ML injection 4 mg  4 mg Intravenous Once    [COMPLETED] HYDROmorphone (Dilaudid) 1 MG/ML injection 1 mg  1 mg Intravenous Once    [COMPLETED] piperacillin-tazobactam (Zosyn) 4.5 g in dextrose 5% 100 mL IVPB-ADDV  4.5 g Intravenous Once    metoprolol succinate ER (Toprol XL) 24 hr tab 50 mg  50 mg Oral Daily Beta Blocker    traZODone (Desyrel) tab 50 mg  50 mg Oral Nightly PRN    sodium chloride 0.9% infusion   Intravenous Continuous    heparin (Porcine) 5000 UNIT/ML injection 5,000 Units  5,000 Units Subcutaneous Q8H GRICELDA    acetaminophen (Tylenol Extra Strength) tab 500 mg  500 mg Oral Q4H PRN    HYDROmorphone (Dilaudid) 1 MG/ML injection 0.2 mg  0.2 mg Intravenous Q2H PRN    Or    HYDROmorphone (Dilaudid) 1 MG/ML injection 0.4 mg  0.4 mg Intravenous Q2H PRN    Or    HYDROmorphone (Dilaudid) 1 MG/ML injection 0.8 mg  0.8 mg Intravenous Q2H PRN    ondansetron (Zofran) 4 MG/2ML injection 4 mg  4 mg Intravenous Q6H PRN    prochlorperazine (Compazine) 10 MG/2ML injection 5 mg  5 mg Intravenous Q8H PRN    piperacillin-tazobactam (Zosyn) 3.375 g in dextrose 5% 100 mL IVPB-ADDV  3.375 g  Intravenous Q8H   [2]   Medications Prior to Admission   Medication Sig Dispense Refill Last Dose/Taking    traZODone 50 MG Oral Tab Take 1 tablet (50 mg total) by mouth nightly as needed for Sleep. 90 tablet 1 Past Week    metoprolol succinate ER 50 MG Oral Tablet 24 Hr Take 1 tablet (50 mg total) by mouth daily. 90 tablet 1 4/26/2025 at  6:30 AM    rosuvastatin 40 MG Oral Tab Take 1 tablet (40 mg total) by mouth nightly. 90 tablet 3 4/26/2025 at  6:30 AM    Bacillus Coagulans-Inulin (PROBIOTIC) 1-250 BILLION-MG Oral Cap Take 1 capsule by mouth in the morning.   4/26/2025 at  6:30 AM    cetirizine 10 MG Oral Tab Take 1 tablet (10 mg total) by mouth in the morning.   4/26/2025 at  6:30 AM    aspirin 81 MG Oral Tab EC Take 1 tablet (81 mg total) by mouth in the morning.   4/26/2025 at  6:30 AM    ondansetron 4 MG Oral Tablet Dispersible Take 1 tablet (4 mg total) by mouth every 4 (four) hours as needed for Nausea. (Patient not taking: Reported on 4/26/2025) 8 tablet 0 Not Taking   [3]   Past Surgical History:  Procedure Laterality Date    Cabg  02/23/2023    Other surgical history      ear surgery as a child (5 years old)    Tonsillectomy     [4]   Social History  Socioeconomic History    Marital status:    Tobacco Use    Smoking status: Former     Current packs/day: 0.00     Average packs/day: 1 pack/day for 25.0 years (25.0 ttl pk-yrs)     Types: Cigarettes     Start date: 1/1/1985     Quit date: 1/1/2010     Years since quitting: 15.3     Passive exposure: Past    Smokeless tobacco: Current    Tobacco comments:     Uses Zynn nicotine pouches occasionally 1-2 per week   Vaping Use    Vaping status: Never Used   Substance and Sexual Activity    Alcohol use: No    Drug use: Never   Other Topics Concern    Caffeine Concern No    Exercise Yes    Seat Belt Yes    Special Diet No    Stress Concern Yes    Weight Concern Yes

## 2025-04-27 NOTE — DISCHARGE INSTRUCTIONS
Post-Surgical Discharge Instructions    Kt Luu MD    Pain: You may take acetaminophen (Tylenol) up to 650 mg every 6 hours as needed for mild-moderate pain. You may take ibuprofen (Motrin) up to 600 mg every 6 hours as needed for mild-moderate pain. Take narcotic pain medication as needed for severe pain per your prescription. Do not drive while taking narcotic pain medication. Many patients take a stool softener as narcotics are known to cause constipation. Okay to use ice packs over abdomen    Diet:  No restrictions.    Activity:  No heavy lifting greater than 10 lbs and no exercising for a total of 6 weeks from the date of surgery.  You may walk and climb stairs with moderation. If your abdomen is more uncomfortable than the day before, you need to be less active as you may be pulling on your stitches.    Bathing:  You may shower as often as you would like, but no submerging the incisions under water for a total of 2 weeks from the date of surgery.  This includes no swimming, no baths, and no hot-tubs.      Wound:  Keep the wound dry.  No dressing is necessary unless there is drainage coming from the wound.  If the drainage is excessive or looks like pus, please call our office.    Driving: You may drive provided that you are no longer taking your narcotic pain medication.      Bowel Function:  After surgery, your bowel movements will be irregular.  It is normal to have up to 3 bowel movements a day or as low as 1 bowel movement every 3 days.  Occasionally, your movements may be even more irregular than this.  As long as you are not vomiting or have a fever over 100.3, you don’t need to be overly concerned.      Return to Work:  Most patients return to work after 1-2 weeks from the date of their surgery.  You will still have a lifting restriction of no greater than 10 lbs from the date of your surgery until 6 weeks.  If your work requires heavy lifting, you will need to stay off work for 6 weeks.  When  you are ready to return to work, please call the office and we will send a work release to your employer.      Appointment: Please call our office for an appointment in 10-14 days after surgery, unless otherwise instructed.  This will allow ample time for the swelling and soreness to resolve before your wound is examined. If you have fevers, chills, or if you are concerned about your wound, please call us immediately at [phone].      Thank you for entrusting us with your care.

## 2025-04-27 NOTE — H&P
[facility]     Jabier Patel Patient Status:  Emergency    1975 MRN [id number]   Location [facility] EMERGENCY DEPARTMENT Attending Jaylen Medeiros, DO   Hosp Day # 0 PCP Stephon Allen MD     Chief Complaint: abd pain    Subjective:    History of Present Illness:     Jabier Patel is a 49 year old male with PMhx of CAD, DL, JS, DM presenst with abd pain. Pt was recently diagnosed with cholelithiasis. He was evaluated in ED and went to [place] for his [place]. When he got there he experienced worsening pain and took a flight back to [place] and came straight to ED. He denies any F/C. He states pain is 10/10. He has had associted N/V. He denies any CP or SOB.     History/Other:    Past Medical History:  Past Medical History:    Allergic rhinitis    Arthritis    R shoulder    Atherosclerosis of coronary artery    Decorative tattoo    Dyslipidemia    H/O: pneumonia    High cholesterol    Hyperlipidemia    Leg swelling    After open heart surgery    Meningitis spinal (HCC)    MRSA infection    Obesity    Obesity (BMI 30-39.9)    JS (obstructive sleep apnea)    Not using cpap    Sleep apnea    Type 2 diabetes mellitus without complication, without long-term current use of insulin (HCC)    Vitamin D deficiency     Past Surgical History:   Past Surgical History:   Procedure Laterality Date    Cabg  2023    Other surgical history      ear surgery as a child (5 years old)    Tonsillectomy        Family History:   Family History   Problem Relation Age of Onset    Other (Other[other]) Father         sleep apnea    Dementia Paternal Grandfather         98yrs old    Cancer Neg     Heart Disease Neg     Stroke Neg      Social History:    reports that he quit smoking about 15 years ago. His smoking use included cigarettes. He started smoking about 40 years ago. He has a 25 pack-year smoking history. He has been exposed to tobacco smoke. He uses smokeless  tobacco. He reports that he does not drink alcohol and does not use drugs.     Allergies:   Allergies   Allergen Reactions    Bees ANAPHYLAXIS     Bee stings-has epinephrine pen    Vicodin [Hydrocodone-Acetaminophen] ITCHING     itching       Medications:    No current facility-administered medications on file prior to encounter.     Current Outpatient Medications on File Prior to Encounter   Medication Sig Dispense Refill    ondansetron 4 MG Oral Tablet Dispersible Take 1 tablet (4 mg total) by mouth every 4 (four) hours as needed for Nausea. 8 tablet 0    traZODone 50 MG Oral Tab Take 1 tablet (50 mg total) by mouth nightly as needed for Sleep. 90 tablet 1    metoprolol succinate ER 50 MG Oral Tablet 24 Hr Take 1 tablet (50 mg total) by mouth daily. 90 tablet 1    rosuvastatin 40 MG Oral Tab Take 1 tablet (40 mg total) by mouth nightly. 90 tablet 3    Bacillus Coagulans-Inulin (PROBIOTIC) 1-250 BILLION-MG Oral Cap Take 1 capsule by mouth in the morning.      cetirizine 10 MG Oral Tab Take 1 tablet (10 mg total) by mouth in the morning.      aspirin 81 MG Oral Tab EC Take 1 tablet (81 mg total) by mouth in the morning.         Review of Systems:   A comprehensive review of systems was completed.    Pertinent positives and negatives noted in the HPI.    Objective:   Physical Exam:    /78   Pulse 63   Temp 97.2 °F (36.2 °C) (Temporal)   Resp 18   Ht 180.3 cm (5' 11\")   Wt 245 lb (111.1 kg)   SpO2 97%   BMI 34.17 kg/m²   General: No acute distress, Alert  Respiratory: No rhonchi, no wheezes  Cardiovascular: S1, S2. Regular rate and rhythm  Abdomen: Soft, RUQ tender, non-distended, positive bowel sounds  Neuro: No new focal deficits  Extremities: No edema      Results:    Labs:      Labs Last 24 Hours:    Recent Labs   Lab 04/23/25  1005 04/26/25  1823   RBC 5.03 5.17   HGB 15.8 15.9   HCT 44.7 46.3   MCV 88.9 89.6   MCH 31.4 30.8   MCHC 35.3 34.3   RDW 12.0 12.1   NEPRELIM 5.96 3.58   WBC 8.9 6.8   PLT  257.0 259.0       Recent Labs   Lab 04/23/25  1005 04/26/25  1823   * 112*   BUN 13 20   CREATSERUM 1.18 1.52*   EGFRCR 75 55*   CA 9.9 10.1   ALB 4.8 5.2*    146*   K 3.8 4.4    107   CO2 25.0 26.0   ALKPHO 63 75   AST 34* 35*   ALT 36 28   BILT 0.7 0.5   TP 7.4 7.9       Estimated Glomerular Filtration Rate: 55 mL/min/1.73m2 (A) (result from lab).    Lab Results   Component Value Date    INR 1.32 (H) 02/23/2023    INR 0.96 02/13/2023       No results for input(s): \"TROP\", \"TROPHS\", \"CK\" in the last 168 hours.    No results for input(s): \"TROP\", \"PBNP\" in the last 168 hours.    No results for input(s): \"PCT\" in the last 168 hours.    Imaging: Imaging data reviewed in Epic.    Assessment & Plan:      #Acute Cholecystitis   Continue IVF  Pain control  Empiric Zosyn  NPO  Surgery to take to OR in AM    #Acute Kidney Injury  #Hypernatremia  Likely pre renal/dehydration  Continue hydration  Monitor BMP    #CAD  Hold ASA    #Ess HTN  BP stable, resume home meds    #Dyslipidemia  Hold statin while NPO        Plan of care discussed with patient, ED Physician     Yao Salazar MD    Supplementary Documentation:     The 21st Century Cures Act makes medical notes like these available to patients in the interest of transparency. Please be advised this is a medical document. Medical documents are intended to carry relevant information, facts as evident, and the clinical opinion of the practitioner. The medical note is intended as peer to peer communication and may appear blunt or direct. It is written in medical language and may contain abbreviations or verbiage that are unfamiliar.

## 2025-04-27 NOTE — DISCHARGE SUMMARY
[facility]     Jabier Antony Patel Patient Status:  Observation    1975 MRN [id number]   Location [facility] Attending Nic Sherwood,    Hosp Day # 0 PCP Stephon Allen MD     Date of Admission: 2025  Date of Discharge:   ***    Discharge Disposition: Home or Self Care    Discharge Diagnosis:  ***    History of Present Illness: ***    Brief Synopsis: ***    Lace+ Score: 62  59-90 High Risk  29-58 Medium Risk  0-28   Low Risk       TCM Follow-Up Recommendation:  {Care Managers will evaluate the need for follow-up for all patients ages 50+, and high/moderate risk patients ages 25-49. Low risk patients (LACE < 29) will only be evaluated if the \"Still recommend for TCM follow-up\" option is selected from this list.:[id number]}      Procedures during hospitalization:   ***    Incidental or significant findings and recommendations (brief descriptions):  ***    Lab/Test results pending at Discharge:   ***    Consultants:  ***    Discharge Medication List:     Discharge Medications        START taking these medications        Instructions Prescription details   oxyCODONE 5 MG Tabs      Take 1 tablet (5 mg total) by mouth every 6 (six) hours as needed.   Quantity: 10 tablet  Refills: 0            CHANGE how you take these medications        Instructions Prescription details   aspirin 81 MG Tbec  Start taking on: 2025  What changed: These instructions start on 2025. If you are unsure what to do until then, ask your doctor or other care provider.      Take 1 tablet (81 mg total) by mouth in the morning.   Refills: 0            CONTINUE taking these medications        Instructions Prescription details   cetirizine 10 MG Tabs  Commonly known as: ZyrTEC      Take 1 tablet (10 mg total) by mouth in the morning.   Refills: 0     metoprolol succinate ER 50 MG Tb24  Commonly known as: Toprol XL      Take 1 tablet (50 mg total) by mouth daily.   Quantity: 90  tablet  Refills: 1     Probiotic 1-250 BILLION-MG Caps      Take 1 capsule by mouth in the morning.   Refills: 0     rosuvastatin 40 MG Tabs  Commonly known as: Crestor      Take 1 tablet (40 mg total) by mouth nightly.   Quantity: 90 tablet  Refills: 3     traZODone 50 MG Tabs  Commonly known as: Desyrel      Take 1 tablet (50 mg total) by mouth nightly as needed for Sleep.   Quantity: 90 tablet  Refills: 1            STOP taking these medications      ondansetron 4 MG Tbdp  Commonly known as: Zofran-ODT                  Where to Get Your Medications        These medications were sent to [id number] - [place], [place] - [address] [place] [facility] [address], [phone], [phone]  [address], [facility] [address]      Phone: [phone]   oxyCODONE 5 MG Tabs         [place] reviewed: ***    Follow-up appointment:   [facility]  [address] [facility] [address] [phone]    Follow up in 2 week(s)      Kt Luu MD  [address] [facility] [address] [phone]    Follow up  As needed    Appointments for Next 30 Days 4/27/2025 - 5/27/2025        Date Arrival Time Visit Type Length Department Provider     5/15/2025  9:45 AM  [facility] NEW PATIENT OS [[id number]] 30 min [facility], [facility] Kourtney Kwok MD    Patient Instructions:     Please arrive 15 minutes prior to your scheduled appointment. Please also bring your Insurance card, Photo ID, and your medication bottles or a list of your current medication.    If your condition improves and this appointment is no longer needed, please contact your physician office to cancel.    Please verify with your primary care provider if your insurance requires a referral.        Location Instructions:     Masks are optional for all patients and visitors, unless otherwise indicated. Note: A $50 fee will be charged for missed appointments or same-day cancellations. Please provide 24 hours' notice if  you need to cancel or reschedule your appointment.                      Vital signs:  Temp:  [97.1 °F (36.2 °C)-97.9 °F (36.6 °C)] 97.2 °F (36.2 °C)  Pulse:  [36-82] 58  Resp:  [9-19] 15  BP: (103-171)/() 132/86  SpO2:  [94 %-100 %] 100 %    Physical Exam:    General: No acute distress   Lungs: clear to auscultation  Cardiovascular: S1, S2  Abdomen: Soft  ***    -----------------------------------------------------------------------------------------------  PATIENT DISCHARGE INSTRUCTIONS: See electronic chart    Nic Sherwood DO    Total time spent on discharge planning:  *** minutes     The 21st Century Cures Act makes medical notes like these available to patients in the interest of transparency. Please be advised this is a medical document. Medical documents are intended to carry relevant information, facts as evident, and the clinical opinion of the practitioner. The medical note is intended as peer to peer communication and may appear blunt or direct. It is written in medical language and may contain abbreviations or verbiage that are unfamiliar.

## 2025-04-27 NOTE — ED PROVIDER NOTES
Patient Seen in: [facility] Emergency Department      History     Chief Complaint   Patient presents with    Abdomen/Flank Pain     Stated Complaint: abd pain    Subjective:   49-year-old male, history of cardiac disease, hypertension, seen here few days ago with cholelithiasis.  Was discharged home with pain control as he was going on his honeymoon to [place].  Catheter yesterday, pain much worse last night, but to 5, immediately.  Right upper quadrant pain with nausea vomiting.          History of Present Illness               Objective:     Past Medical History:    Allergic rhinitis    Arthritis    R shoulder    Atherosclerosis of coronary artery    Decorative tattoo    Dyslipidemia    H/O: pneumonia    High cholesterol    Hyperlipidemia    Leg swelling    After open heart surgery    Meningitis spinal (HCC)    MRSA infection    Obesity    Obesity (BMI 30-39.9)    JS (obstructive sleep apnea)    Not using cpap    Sleep apnea    Type 2 diabetes mellitus without complication, without long-term current use of insulin (HCC)    Vitamin D deficiency              Past Surgical History:   Procedure Laterality Date    Cabg  02/23/2023    Other surgical history      ear surgery as a child (5 years old)    Tonsillectomy                  Social History     Socioeconomic History    Marital status:    Tobacco Use    Smoking status: Former     Current packs/day: 0.00     Average packs/day: 1 pack/day for 25.0 years (25.0 ttl pk-yrs)     Types: Cigarettes     Start date: 1/1/1985     Quit date: 1/1/2010     Years since quitting: 15.3     Passive exposure: Past    Smokeless tobacco: Current    Tobacco comments:     Uses Zynn nicotine pouches occasionally 1-2 per week   Vaping Use    Vaping status: Never Used   Substance and Sexual Activity    Alcohol use: No    Drug use: Never   Other Topics Concern    Caffeine Concern No    Exercise Yes    Seat Belt Yes    Special Diet No    Stress Concern Yes    Weight Concern Yes                                 Physical Exam     ED Triage Vitals [04/26/25 8647]   BP (!) 171/109   Pulse 63   Resp 18   Temp 97.2 °F (36.2 °C)   Temp src Temporal   SpO2 97 %   O2 Device None (Room air)       Current Vitals:   Vital Signs  BP: 137/78  Pulse: 63  Resp: 18  Temp: 97.2 °F (36.2 °C)  Temp src: Temporal    Oxygen Therapy  SpO2: 97 %  O2 Device: None (Room air)        Physical Exam  Vitals and nursing note reviewed.   HENT:      Head: Normocephalic.   Cardiovascular:      Rate and Rhythm: Normal rate.      Heart sounds: Normal heart sounds.   Pulmonary:      Effort: Pulmonary effort is normal.      Breath sounds: Normal breath sounds.   Abdominal:      Palpations: Abdomen is soft.      Tenderness: There is abdominal tenderness in the right upper quadrant. There is rebound. There is no right CVA tenderness or left CVA tenderness.      Hernia: No hernia is present.   Skin:     General: Skin is warm and dry.      Findings: No rash.   Neurological:      Mental Status: He is alert.   Psychiatric:         Mood and Affect: Mood normal.         Behavior: Behavior normal.           Physical Exam                ED Course     Labs Reviewed   COMP METABOLIC PANEL (14) - Abnormal; Notable for the following components:       Result Value    Glucose 112 (*)     Sodium 146 (*)     Creatinine 1.52 (*)     Calculated Osmolality 305 (*)     eGFR-Cr 55 (*)     AST 35 (*)     Albumin 5.2 (*)     All other components within normal limits   LIPASE - Abnormal; Notable for the following components:    Lipase 57 (*)     All other components within normal limits   CBC WITH DIFFERENTIAL WITH PLATELET   PROTHROMBIN TIME (PT)   PTT, ACTIVATED   TYPE AND SCREEN    Narrative:     The following orders were created for panel order Type and screen.  Procedure                               Abnormality         Status                     ---------                               -----------         ------                     ABORH (Blood  Type)[[id number]]                                                          Antibody Screen[[id number]]                                                               Please view results for these tests on the individual orders.   ABORH (BLOOD TYPE)   ANTIBODY SCREEN   RAINBOW DRAW LAVENDER   RAINBOW DRAW LIGHT GREEN          Results                                 MDM      US GALLBLADDER (CPT=76705)  Result Date: 4/26/2025  CONCLUSION:  Distended gallbladder with sludge and mild wall thickening with pain over the gallbladder can be seen with acute cholecystitis.   LOCATION:  [facility]    Dictated by (CST): Joseph Steele MD on 4/26/2025 at 7:25 PM     Finalized by (CST): Joseph Steele MD on 4/26/2025 at 7:26 PM         External chart review demonstrates outpatient cardiac visits earlier this year    Wife at bedside helpful to provide information on his presenting illness    Differential diagnosis includes, but not limited to, cystitis, symptomatic cholelithiasis, choledocholithiasis, kidney stone    I depend interpreted the on the gallbladder and note the wall thickening and some sludge    49-year-old male with symptomatic cholelithiasis versus acute cholecystitis.  LFTs are stable.  Vital signs are stable with pain control.  Was hypertensive secondary to pain.  Tenderness to the right upper quadrant with rebound and guarding.  Discussed with Dr. Luu, will plan for surgery in the morning.  N.p.o. since this morning.  Keep NPO.  Zosyn given.  Pain controlled with Dilaudid after morphine.  Zofran given.  Admitted to [facility], awaiting bed assignment        Admission disposition: 4/26/2025  7:37 PM           Medical Decision Making      Disposition and Plan     Clinical Impression:  1. Acute cholecystitis    2. Elevated blood pressure reading with diagnosis of hypertension         Disposition:  Admit  4/26/2025  7:37 pm    Follow-up:  No follow-up provider specified.        Medications Prescribed:  Current Discharge  Medication List          Supplementary Documentation:         Hospital Problems       Present on Admission  Date Reviewed: 12/5/2024          ICD-10-CM Noted POA    * (Principal) Acute cholecystitis K81.0 4/26/2025 Unknown

## 2025-04-27 NOTE — PLAN OF CARE
A&Ox4. VSS. RA, JS with CPAP at night . Denies chest pain and SOB.   Telemetry: NSR.   GI: Abdomen soft, nondistended, tenderness Passing gas.   Denies nausea.   : Voids.   Pain controlled with PRN pain medications.   Up ad ayden   Diet:  NPO  IVF running per order.   All appropriate safety measures in place. All questions and concerns addressed.    2 person skin check done with CHRIS Burris- no skin break down- C/D/I

## 2025-04-27 NOTE — CONSULTS
[facility]  Report of Consultation    Jabier Patel Patient Status:  Observation    1975 MRN [id number]   Location [facility] 3NW-A Attending Nic Sherwood, DO   Hosp Day # 0 PCP Stephon Allen MD     Requesting Physician:  Dr. Medeiros    Reason for Consultation:  Acute cholecystitis    Chief Complaint:  Abdominal pain    History of Present Illness:  Jabier Patel is a 49 year old male who presents to [facility] on 2025 with complaints of abdominal pain.     He states he has had intermittent abdominal pain for approximately 1 week. He states the pain is primarily epigastric. He states the first episode of pain was one week ago. He states the pain lasted 4-6 hours and then resolved. He states his pain was associated with an episode of nausea and vomiting.     The pain recurred Monday evening and woke him from sleep and again on Wednesday.     He came to the emergency department on 2025 for evaluation of his symptoms. ED workup revealed biliary colic.     He was traveling to [place] for his [place] this week, and deferred any management at that time until he returned form his trip. He was planning to follow a low fat diet until further follow up.     He states once arriving in [place], his pain worsened, so he flew home and presented directly to the emergency department.     Upon presentation to the hospital, abdominal ultrasound revealed a distended gallbladder with sludge. The wall is slightly thickened at 4 mm. The commnon bile duct is normal at 3 mm. Negative sonographic Rodriguez's sign.     Past medical history significant for CAD s/p CABG x 3 in . ON 81 mg of Aspirin daily. He also has a past medical history significant for JS, dyslipidemia, HTN, DM.     No significant past surgical history.     History:  Past Medical History[1]  Past Surgical History[2]  Family History[3]   reports that he quit smoking about 15 years ago. His smoking use included cigarettes.  He started smoking about 40 years ago. He has a 25 pack-year smoking history. He has been exposed to tobacco smoke. He uses smokeless tobacco. He reports that he does not drink alcohol and does not use drugs.    Allergies:  Allergies[4]    Medications:  Prescriptions Prior to Admission[5]    Current Hospital Medications[6]    Review of Systems:  Review of Systems   Constitutional:  Negative for chills and fever.   Gastrointestinal:  Positive for nausea, vomiting and abdominal pain. Negative for diarrhea and constipation.   All other systems reviewed and are negative.      Physical Exam:  /59 (BP Location: Left arm)   Pulse (!) 46   Temp 97.6 °F (36.4 °C) (Oral)   Resp 16   Ht 71\"   Wt 245 lb (111.1 kg)   SpO2 96%   BMI 34.17 kg/m²   Physical Exam  Vitals and nursing note reviewed.   Constitutional:       Appearance: Normal appearance. He is obese.   HENT:      Head: Normocephalic and atraumatic.      Right Ear: External ear normal.      Left Ear: External ear normal.      Nose: Nose normal.   Eyes:      General: No scleral icterus.     Conjunctiva/sclera: Conjunctivae normal.   Pulmonary:      Effort: Pulmonary effort is normal. No respiratory distress.   Abdominal:      General: There is no distension.      Palpations: Abdomen is soft. There is no mass.      Tenderness: There is abdominal tenderness in the right upper quadrant.      Hernia: No hernia is present.   Musculoskeletal:      Cervical back: Normal range of motion.   Skin:     General: Skin is warm and dry.   Neurological:      Mental Status: He is alert.   Psychiatric:         Mood and Affect: Mood normal.         Behavior: Behavior normal.         Judgment: Judgment normal.         Laboratory Data:  Recent Labs   Lab 04/23/25  1005 04/26/25  1823 04/27/25  0532   RBC 5.03 5.17 4.29*   HGB 15.8 15.9 13.4   HCT 44.7 46.3 38.5*   MCV 88.9 89.6 89.7   MCH 31.4 30.8 31.2   MCHC 35.3 34.3 34.8   RDW 12.0 12.1 12.4   NEPRELIM 5.96 3.58 2.74   WBC  8.9 6.8 6.0   .0 259.0 191.0       Recent Labs   Lab 04/23/25  1005 04/26/25  1823 04/27/25  0533   * 112* 130*   BUN 13 20 17   CREATSERUM 1.18 1.52* 1.37*   CA 9.9 10.1 8.8   ALB 4.8 5.2* 4.1    146* 145   K 3.8 4.4 4.3    107 111   CO2 25.0 26.0 25.0   ALKPHO 63 75 52   AST 34* 35* 22   ALT 36 28 23   BILT 0.7 0.5 0.6   TP 7.4 7.9 5.9         Recent Labs   Lab 04/26/25  1948 04/27/25  0533   PTP 14.1 14.1   INR 1.08 1.08   PTT 28.4  --          US GALLBLADDER (CPT=76705)  Result Date: 4/26/2025  CONCLUSION:  Distended gallbladder with sludge and mild wall thickening with pain over the gallbladder can be seen with acute cholecystitis.   LOCATION:  [facility]    Dictated by (CST): Joseph Steele MD on 4/26/2025 at 7:25 PM     Finalized by (CST): Joseph Steele MD on 4/26/2025 at 7:26 PM             Medical Decision Making         Impression:  Problem List[7]    Acute cholecystitis     Plan:  The patient's plan was discussed with Dr. Luu. Plan for a laparoscopic cholecystectomy with ICG today  NPO for surgery today. The patient may start on a diet following surgery  Antiemetics and analgesics as needed  Encourage ambulation and up to chair   DVT prophylaxis with heparin  Medical management per primary  Possible discharge home today pending operative course     Josefa Mckeon PA-C  4/27/2025  7:36 AM    Is this a shared or split note between Advanced Practice Provider and Physician? Yes    The patient was independently examined. I agree with the PA's assessment and plan.     This is a very nice 49-year-old gentleman who presented back to [facility] emergency department evening of 4/26/2025 with ongoing, severe right upper quadrant abdominal pain.  He had similar pain over this past week and was actually in the emergency room on 4/23/2025 but was discharged home.  His symptoms, exam findings and work-up are suggestive of acute cholecystitis.    I recommend proceeding urgently to the  operating room for laparoscopic cholecystectomy with ICG cholangiography, possible cholangiogram, possible open.  The details of this procedure were discussed including the expected recovery time, risks, benefits and alternatives.  Specific risks discussed include but not limited to pain, bleeding, infection, damage to surrounding organs such as the duodenum, colon and common bile duct, possible need for temporary drain placement and possible need for conversion to open.  Patient expressed understanding and was agreeable to proceed with surgery.  Consent was signed.  All questions answered.     Postoperative disposition to be determined pending intraoperative findings and surgical course.  Anticipate discharge home later today versus tomorrow.    More than 50% of clinical time and 100% MDM was performed by me.    Kt Luu MD  [facility] General Surgery             [1]   Past Medical History:   Allergic rhinitis    Arthritis    R shoulder    Atherosclerosis of coronary artery    Decorative tattoo    Dyslipidemia    H/O: pneumonia    High cholesterol    Hyperlipidemia    Leg swelling    After open heart surgery    Meningitis spinal (HCC)    MRSA infection    Obesity    Obesity (BMI 30-39.9)    JS (obstructive sleep apnea)    Not using cpap    Sleep apnea    Type 2 diabetes mellitus without complication, without long-term current use of insulin (HCC)    Vitamin D deficiency   [2]   Past Surgical History:  Procedure Laterality Date    Cabg  02/23/2023    Other surgical history      ear surgery as a child (5 years old)    Tonsillectomy     [3]   Family History  Problem Relation Age of Onset    Other (Other[other]) Father         sleep apnea    Dementia Paternal Grandfather         98yrs old    Cancer Neg     Heart Disease Neg     Stroke Neg    [4]   Allergies  Allergen Reactions    Bees ANAPHYLAXIS     Bee stings-has epinephrine pen    Vicodin [Hydrocodone-Acetaminophen] ITCHING     itching   [5]   Medications Prior to  Admission   Medication Sig    traZODone 50 MG Oral Tab Take 1 tablet (50 mg total) by mouth nightly as needed for Sleep.    metoprolol succinate ER 50 MG Oral Tablet 24 Hr Take 1 tablet (50 mg total) by mouth daily.    rosuvastatin 40 MG Oral Tab Take 1 tablet (40 mg total) by mouth nightly.    Bacillus Coagulans-Inulin (PROBIOTIC) 1-250 BILLION-MG Oral Cap Take 1 capsule by mouth in the morning.    cetirizine 10 MG Oral Tab Take 1 tablet (10 mg total) by mouth in the morning.    aspirin 81 MG Oral Tab EC Take 1 tablet (81 mg total) by mouth in the morning.    ondansetron 4 MG Oral Tablet Dispersible Take 1 tablet (4 mg total) by mouth every 4 (four) hours as needed for Nausea. (Patient not taking: Reported on 4/26/2025)   [6]   Current Facility-Administered Medications:     indocyanine green (IC-Green) injection 5 mg, 5 mg, Intravenous, Once    metoprolol succinate ER (Toprol XL) 24 hr tab 50 mg, 50 mg, Oral, Daily Beta Blocker    traZODone (Desyrel) tab 50 mg, 50 mg, Oral, Nightly PRN    sodium chloride 0.9% infusion, , Intravenous, Continuous    heparin (Porcine) 5000 UNIT/ML injection 5,000 Units, 5,000 Units, Subcutaneous, Q8H GRICELDA    acetaminophen (Tylenol Extra Strength) tab 500 mg, 500 mg, Oral, Q4H PRN    HYDROmorphone (Dilaudid) 1 MG/ML injection 0.2 mg, 0.2 mg, Intravenous, Q2H PRN **OR** HYDROmorphone (Dilaudid) 1 MG/ML injection 0.4 mg, 0.4 mg, Intravenous, Q2H PRN **OR** HYDROmorphone (Dilaudid) 1 MG/ML injection 0.8 mg, 0.8 mg, Intravenous, Q2H PRN    ondansetron (Zofran) 4 MG/2ML injection 4 mg, 4 mg, Intravenous, Q6H PRN    prochlorperazine (Compazine) 10 MG/2ML injection 5 mg, 5 mg, Intravenous, Q8H PRN    piperacillin-tazobactam (Zosyn) 3.375 g in dextrose 5% 100 mL IVPB-ADDV, 3.375 g, Intravenous, Q8H  [7]   Patient Active Problem List  Diagnosis    JS (obstructive sleep apnea)    Gout    Blood pressure elevated without history of HTN    BPH (benign prostatic hyperplasia)    Dyslipidemia     Obesity (BMI 30-39.9)    YE (acute kidney injury)    Vitamin D deficiency    Therapeutic drug monitoring    Pulmonary HTN (HCC)    Mild ascending aorta dilatation    CAD (coronary artery disease)    H/O: gout    Hypertension    Pleural effusion    Chest pain, exertional    Edema of extremities    Athscl heart disease of native coronary artery w/o ang pctrs    Body mass index (BMI) of 33.0-33.9 in adult    Benign essential HTN    Personal history of pneumonia (recurrent)    Pure hypercholesterolemia, unspecified    Pulmonary hypertension, unspecified (HCC)    Personal history of nicotine dependence    Problems related to living alone    Presence of aortocoronary bypass graft    Obstructive sleep apnea (adult) (pediatric)    Encntr for surgical aftcr following surgery on the circ sys    History of falling    Long term (current) use of aspirin    Long term (current) use of oral hypoglycemic drugs    Obesity, unspecified    Acute cholecystitis    Hypernatremia    Elevated blood pressure reading with diagnosis of hypertension

## 2025-04-27 NOTE — ED QUICK NOTES
Orders for admission, patient is aware of plan and ready to go upstairs. Any questions, please call ED JANIE Horan at extension [phone].     Patient Covid vaccination status: Fully vaccinated     COVID Test Ordered in ED: None    COVID Suspicion at Admission: N/A    Running Infusions: Medication Infusions[1]     Mental Status/LOC at time of transport: A&Ox4    Other pertinent information:   CIWA score: N/A   NIH score:  N/A             [1]

## 2025-04-27 NOTE — ANESTHESIA POSTPROCEDURE EVALUATION
[facility]    Jabier Patel Patient Status:  Observation   Age/Gender 49 year old male MRN [id number]   Location [facility] POST ANESTHESIA CARE UNIT Attending Nic Sherwood DO   [facility] Day # 0 PCP Stephon Allen MD       Anesthesia Post-op Note    LAPAROSCOPIC CHOLECYSTECTOMY WITH INDOCYANINE GREEN INJECTION    Procedure Summary       Date: 04/27/25 Room / Location:  MAIN OR 08 /  MAIN OR    Anesthesia Start: 0935 Anesthesia Stop: 1105    Procedure: LAPAROSCOPIC CHOLECYSTECTOMY WITH INDOCYANINE GREEN INJECTION (Abdomen) Diagnosis:       Acute cholecystitis      (Acute cholecystitis [K81.0])    Surgeons: Kt Luu MD Anesthesiologist: Deidra Shane MD    Anesthesia Type: general ASA Status: 3            Anesthesia Type: general    Vitals Value Taken Time   /82 04/27/25 11:47   Temp 97.1 °F (36.2 °C) 04/27/25 11:02   Pulse 70 04/27/25 11:49   Resp 16 04/27/25 11:49   SpO2 100 % 04/27/25 11:49   Vitals shown include unfiled device data.        Patient Location: PACU    Anesthesia Type: general    Airway Patency: patent and extubated    Postop Pain Control: adequate    Mental Status: mildly sedated but able to meaningfully participate in the post-anesthesia evaluation    Nausea/Vomiting: none    Cardiopulmonary/Hydration status: stable euvolemic    Complications: no apparent anesthesia related complications    Postop vital signs: stable    Dental Exam: Unchanged from Preop    Patient to be discharged from PACU when criteria met.

## 2025-04-27 NOTE — PROGRESS NOTES
A&Ox4. VSS. RA. .  Telemetry: NSR  GI: Abdomen soft, nondistended. Passing gas.  Denies nausea.  : Voids.  Pain controlled with PRN pain medications  Up independently   Diet: NPO for surgery   IVF running per order.  All appropriate safety measures in place. All questions and concerns addressed.

## 2025-04-27 NOTE — OPERATIVE REPORT
[facility]  Operative Note    Jabier Patel Location: OR   [facility] [id number] MRN [id number]    1975 Age 49 year old   Admission Date 2025 Operation Date 2025   Attending Physician Nic Sherwood DO Operating Physician Kt Luu MD   PCP Stephon Allen MD          Patient Name: Jabier Patel    Preoperative Diagnosis: Acute cholecystitis [K81.0]    Postoperative Diagnosis: Same as preoperative diagnosis    Primary Surgeon: Kt Luu MD    Assistant: Josefa AGARWAL    Anesthesia: General    Procedures: Laparoscopic cholecystectomy with ICG cholangiography    Implants: None    Specimen: Gallbladder    Drains: None    Estimated Blood Loss: 5 cc    Complications: None immediate    Condition: Stable    Indications for Surgery:   This is a very nice 49-year-old gentleman who presented back to [facility] emergency department evening of 2025 with ongoing, severe right upper quadrant abdominal pain.  He had similar pain over this past week and was actually in the emergency room on 2025 but was discharged home.  His symptoms, exam findings and work-up are suggestive of acute cholecystitis.     I recommend proceeding urgently to the operating room for laparoscopic cholecystectomy with ICG cholangiography, possible cholangiogram, possible open.  The details of this procedure were discussed including the expected recovery time, risks, benefits and alternatives.  Specific risks discussed include but not limited to pain, bleeding, infection, damage to surrounding organs such as the duodenum, colon and common bile duct, possible need for temporary drain placement and possible need for conversion to open.  Patient expressed understanding and was agreeable to proceed with surgery.  Consent was signed.  All questions answered.    Surgical Findings:   Markedly distended gallbladder with pericholecystic edema and mild thickening of the peritoneum overlying the gallbladder consistent  with acute cholecystitis.  Flimsy omental adhesions to the neck and infundibulum of the gallbladder.    Description of Procedure:   Patient was brought to the operating room and positioned supine on a pink foam pad.  Bilateral sequential compression device were placed.  Preoperative antibiotics were given. Patient was induced under general endotracheal anesthesia.  The abdomen was prepped and draped in the usual sterile fashion. A timeout was performed.     A 12 mm transverse skin incision was made just above the umbilicus.  Pneumoperitoneum was established using a Veress needle at this site.  There was appropriately low opening pressure.  The Veress needle was removed and a 12 mm optical trocar was inserted into the peritoneal cavity under direct vision. There was no evidence of underlying visceral injury.  A 5 mm epigastric trocar was placed under direct vision.  Patient was positioned in reverse Trendelenburg and tilted with right side up. Two additional 5 mm trocars were placed under direct vision beneath the right costal margin.     I identified the fundus of the gallbladder beneath the right lobe of the liver. The gallbladder fundus was retracted cephalad over the liver.  The omental adhesions to the neck and infundibulum of the gallbladder were divided using hook electrocautery.  I began by scoring the peritoneum overlying the neck and infundibulum of the gallbladder using hook electrocautery. The peritoneum and fatty-fibrotic tissue overlying the neck and infundibulum of the gallbladder was dissected off using a combination of sharp and blunt dissection with hook electrocautery and a [place] dissector.  Eventually, I was able to establish a critical view of safety.  ICG cholangiography was used to verify the biliary anatomy.     The cystic artery was ligated between metal clips and divided, two on the stay side and one on the specimen side.  Similarly, the cystic duct was ligated between metal clips and  divided, two on the stay side and one on the specimen side.  The remaining attachments between the gallbladder and liver bed were divided using hook electrocautery. Any remaining small bleeding vessels encountered during this dissection were controlled with electrocautery. The gallbladder was placed in a specimen bag to be retrieved through the periumbilical port. The surgical field was copiously irrigated and suctioned.  The surgical field and liver bed appeared hemostatic.     The patient was leveled.  The gallbladder was successfully retrieved intact within the specimen bag.  The fascia at the extraction site was closed using a figure-of-8 0 PDS suture with the assistance of a Marvel-Nicole device.  The remaining trocars were removed under direct vision and appeared hemostatic.  Pneumoperitoneum was evacuated. Each skin incision was anesthetized using a total of 30 cc of 0.25% Marcaine.  Each skin incision was closed using 4-0 Monocryl in a subcuticular fashion.  The skin was cleaned and dried and skin glue was placed over each incision as a final dressing.     The patient was awakened from anesthesia, extubated and transported to the postanesthesia care unit in stable condition. All sponge, needle and instrument counts were correct at the end of the case.  I was present for the entire case.      Kt Luu MD  4/27/2025  10:52 AM

## 2025-04-27 NOTE — ANESTHESIA PROCEDURE NOTES
Patient Education     Treatment for Premature Ventricular Contractions (PVCs)  Premature ventricular contractions (PVCs) are a type of abnormal heartbeat (arrhythmia). They are very common. They can occur in people of all ages from time to time. They usually cause only mild symptoms.  Types of treatment  Most people with PVCs don’t need any treatment. If you are treated for another problem with your heart, your PVCs may decrease. For example, you might take a medicine to lower your blood pressure. This may lower your rate of PVCs.  In some cases, specific treatment may be done to help prevent PVCs. These are used only if you have symptoms from PVCs. Choices include:  · Medicines called beta-blockers  · Other medicines to help prevent arrhythmias  · Catheter ablation, a procedure to destroy the cells in the heart causing the abnormal beats  Living with PVCs  Your healthcare provider may give your more instructions about how to manage your PVCs, such as:  · Eating a heart-healthy diet  · Getting enough exercise  · Maintaining a healthy weight  · Not consuming too much alcohol or caffeine, which can trigger PVCs  · Avoiding too much stress and fatigue, which can also trigger PVCs  · Getting treatment for your other health conditions, such as high blood pressure  · Making sure to keep all your medical appointments  When to call your healthcare provider  Call your healthcare provider right away if you have any of these:  · Symptoms that get worse over time  · Severe symptoms such as chest pain, near-fainting, or sudden shortness of breath   Date Last Reviewed: 8/10/2015  © 7127-3224 [employer]. [address] [place] [address], [place] [address]. All rights reserved. This information is not intended as a substitute for professional medical care. Always follow your healthcare professional's instructions.            Airway  Date/Time: 4/27/2025 9:49 AM  Reason: elective      General Information and Staff   Patient location during procedure: OR  Anesthesiologist: Deidra Shane MD  Performed: anesthesiologist   Performed by: Deidra Shane MD  Authorized by: Deidra Shane MD        Indications and Patient Condition  Indications for airway management: anesthesia  Sedation level: deep      Preoxygenated: yesPatient position: sniffing    Mask difficulty assessment: 1 - vent by mask    Final Airway Details    Final airway type: endotracheal airway    Successful airway: ETT  Cuffed: yes   Successful intubation technique: direct laryngoscopy  Endotracheal tube insertion site: oral  Blade: GlideScope  Blade size: #4  ETT size (mm): 7.5    Cormack-Lehane Classification: grade I - full view of glottis  Placement verified by: capnometry   Cuff volume (mL): 7  Measured from: lips  ETT to lips (cm): 23  Number of attempts at approach: 1  Number of other approaches attempted: 1    Other Attempts  Unsuccessful attempted airways: endotracheal tube  Unsuccessful attempted endotracheal techniques: direct laryngoscopy    Additional Comments  Grade 2 view w MAC3, unable to see cords w Talavera 2; proceeded to glidescope. One attempt, no trauma

## 2025-04-28 ENCOUNTER — PATIENT OUTREACH (OUTPATIENT)
Dept: CASE MANAGEMENT | Age: 50
End: 2025-04-28

## 2025-04-28 NOTE — PROGRESS NOTES
TCM Request  Hospital follow-up (discharged 4/27 [facility])        PCP Request :  Dr. Stephon Allen  [address] [address] [address]  [phone]          Attempt #1:  Left message on voicemail for patient to call transitions specialist back to schedule follow up appointments. Provided Transitions specialist scheduling phone number [phone].

## 2025-04-29 NOTE — PROGRESS NOTES
TCM request (discharged 04/27)    Dr Stephon Allen  [facility]  [address] [facility] [address]  [place] [address]  [phone]  Patient requesting all appointments on the same day  Apt made:  Mon 05/12 @8:30am    [facility]  [address] [place] [address]  [phone]  Follow up 2 weeks  Apt made:  Mon 05/12 @11:15am  Confirmed w/pt  Closing encounter

## 2025-04-30 ENCOUNTER — TELEPHONE (OUTPATIENT)
Dept: FAMILY MEDICINE CLINIC | Facility: CLINIC | Age: 50
End: 2025-04-30

## 2025-04-30 NOTE — TELEPHONE ENCOUNTER
Patient's wife called and said she came home from doing errands and something didn't look right with Leighton.  She made him take his B/P and it was 158/98 and she feels like that is a little concerning. Amy can be reached at [phone].

## 2025-05-01 NOTE — PROGRESS NOTES
Seen by patient Jabier Patel on 4/30/2025  5:09 PM   Seen by proxy Amy Patel on 4/30/2025  5:05 PM

## 2025-05-06 ENCOUNTER — TELEPHONE (OUTPATIENT)
Dept: FAMILY MEDICINE CLINIC | Facility: CLINIC | Age: 50
End: 2025-05-06

## 2025-05-09 NOTE — TELEPHONE ENCOUNTER
Never received a return call. Patient has appointment with Dr. Allen on 5/12/25    Future Appointments   Date Time Provider Department Center   5/12/2025  8:30 AM Stephon Allen MD [facility] [address] [facility] [address]   5/12/2025 11:15 AM [facility] [id number]

## 2025-05-12 ENCOUNTER — OFFICE VISIT (OUTPATIENT)
Facility: LOCATION | Age: 50
End: 2025-05-12
Payer: COMMERCIAL

## 2025-05-12 ENCOUNTER — OFFICE VISIT (OUTPATIENT)
Dept: FAMILY MEDICINE CLINIC | Facility: CLINIC | Age: 50
End: 2025-05-12
Payer: COMMERCIAL

## 2025-05-12 VITALS
OXYGEN SATURATION: 97 % | DIASTOLIC BLOOD PRESSURE: 84 MMHG | TEMPERATURE: 99 F | HEART RATE: 55 BPM | SYSTOLIC BLOOD PRESSURE: 131 MMHG

## 2025-05-12 VITALS
DIASTOLIC BLOOD PRESSURE: 80 MMHG | SYSTOLIC BLOOD PRESSURE: 132 MMHG | HEART RATE: 74 BPM | RESPIRATION RATE: 16 BRPM | OXYGEN SATURATION: 98 % | TEMPERATURE: 98 F | BODY MASS INDEX: 34.44 KG/M2 | HEIGHT: 71 IN | WEIGHT: 246 LBS

## 2025-05-12 DIAGNOSIS — Z98.890 POST-OPERATIVE STATE: Primary | ICD-10-CM

## 2025-05-12 DIAGNOSIS — N17.9 AKI (ACUTE KIDNEY INJURY): ICD-10-CM

## 2025-05-12 DIAGNOSIS — Z90.49 STATUS POST LAPAROSCOPIC CHOLECYSTECTOMY: ICD-10-CM

## 2025-05-12 DIAGNOSIS — K81.0 ACUTE CHOLECYSTITIS: Primary | ICD-10-CM

## 2025-05-12 PROBLEM — I27.20 PULMONARY HYPERTENSION, UNSPECIFIED (HCC): Status: RESOLVED | Noted: 2023-01-01 | Resolved: 2025-05-12

## 2025-05-12 PROBLEM — I27.20 PULMONARY HTN (HCC): Status: RESOLVED | Noted: 2017-02-27 | Resolved: 2025-05-12

## 2025-05-12 PROCEDURE — 3075F SYST BP GE 130 - 139MM HG: CPT | Performed by: FAMILY MEDICINE

## 2025-05-12 PROCEDURE — G2211 COMPLEX E/M VISIT ADD ON: HCPCS | Performed by: FAMILY MEDICINE

## 2025-05-12 PROCEDURE — 3008F BODY MASS INDEX DOCD: CPT | Performed by: FAMILY MEDICINE

## 2025-05-12 PROCEDURE — 99214 OFFICE O/P EST MOD 30 MIN: CPT | Performed by: FAMILY MEDICINE

## 2025-05-12 PROCEDURE — 3079F DIAST BP 80-89 MM HG: CPT | Performed by: FAMILY MEDICINE

## 2025-05-12 NOTE — PROGRESS NOTES
Subjective:   Patient ID: Jabier Patel is a 49 year old male.    HPI  Mr. Patel is a pleasant 49-year-old gentleman with history of hypertension, dyslipidemia, coronary disease status post CABG, gout, sleep apnea, BPH presenting today for hospital follow-up.  He was hospitalized on 4/26/2025 for acute cholecystitis.  General surgery was consulted.  He underwent subsequent lap cholecystectomy on 4/27.  He was also noted to be hyponatremic and had acute renal insufficiency and was given IV fluids which provided improvement.    He also has [facility] paperwork for me to sign off.  He also needs forms filled out as his honeymoon was shortened because of above-mentioned condition.      I had reviewed past medical and family histories together with allergy and medication lists documented.        History/Other:   Review of Systems   Constitutional:  Negative for fatigue, fever and unexpected weight change.   HENT:  Negative for sore throat and trouble swallowing.    Respiratory:  Negative for cough and shortness of breath.    Cardiovascular:  Negative for chest pain.   Gastrointestinal:  Negative for abdominal pain, constipation, diarrhea, nausea and vomiting.   Genitourinary:  Negative for difficulty urinating.   Neurological:  Negative for dizziness and weakness.     Current Medications[1]  Allergies:Allergies[2]    Objective:   Physical Exam  Vitals reviewed.   Constitutional:       General: He is not in acute distress.  HENT:      Mouth/Throat:      Mouth: Mucous membranes are moist.      Pharynx: Oropharynx is clear.   Eyes:      General: No scleral icterus.     Conjunctiva/sclera: Conjunctivae normal.   Cardiovascular:      Rate and Rhythm: Normal rate and regular rhythm.      Heart sounds: Normal heart sounds. No murmur heard.  Pulmonary:      Effort: Pulmonary effort is normal. No respiratory distress.      Breath sounds: Normal breath sounds. No wheezing or rales.   Abdominal:      General: Bowel sounds are  normal. There is no distension.      Palpations: Abdomen is soft.      Tenderness: There is no abdominal tenderness.      Comments: Well-healed surgical scars.   Musculoskeletal:      Cervical back: Neck supple.      Right lower leg: No edema.      Left lower leg: No edema.   Lymphadenopathy:      Cervical: No cervical adenopathy.   Skin:     General: Skin is warm.   Neurological:      Mental Status: He is alert.   Psychiatric:         Mood and Affect: Mood normal.         Behavior: Behavior normal.         Assessment & Plan:   1. Acute cholecystitis   -With gallstones  - Status post lap [place], 4/27/2025  - Stable and doing well  - May take Tylenol as needed for pain  - Continue follow-up with general surgery which is today   2. YE (acute kidney injury)   - Due to dehydration  - Resolved   -Paperwork filled out for his travel  - Will submit [facility] paperwork to forms department  -May go back to work next Monday, 5/19/2025  This note was prepared using Dragon Medical voice recognition dictation software. As a result errors may occur. When identified these errors have been corrected. While every attempt is made to correct errors during dictation discrepancies may still exist            No orders of the defined types were placed in this encounter.    This note was prepared using Dragon Medical voice recognition dictation software. As a result errors may occur. When identified these errors have been corrected. While every attempt is made to correct errors during dictation discrepancies may still exist          Meds This Visit:  Requested Prescriptions      No prescriptions requested or ordered in this encounter       Imaging & Referrals:  None         [1]   Current Outpatient Medications   Medication Sig Dispense Refill    aspirin 81 MG Oral Tab EC Take 1 tablet (81 mg total) by mouth in the morning.      traZODone 50 MG Oral Tab Take 1 tablet (50 mg total) by mouth nightly as needed for Sleep. 90 tablet 1    metoprolol  succinate ER 50 MG Oral Tablet 24 Hr Take 1 tablet (50 mg total) by mouth daily. 90 tablet 1    rosuvastatin 40 MG Oral Tab Take 1 tablet (40 mg total) by mouth nightly. 90 tablet 3    Bacillus Coagulans-Inulin (PROBIOTIC) 1-250 BILLION-MG Oral Cap Take 1 capsule by mouth in the morning.      oxyCODONE 5 MG Oral Tab Take 1 tablet (5 mg total) by mouth every 6 (six) hours as needed. (Patient not taking: Reported on 5/12/2025) 10 tablet 0    cetirizine 10 MG Oral Tab Take 1 tablet (10 mg total) by mouth in the morning.     [2]   Allergies  Allergen Reactions    Bees ANAPHYLAXIS     Bee stings-has epinephrine pen    Vicodin [Hydrocodone-Acetaminophen] ITCHING     itching

## 2025-05-12 NOTE — PATIENT INSTRUCTIONS
Thank you for choosing Stephon Allen MD at [facility]  To Do: Jabier Patel  1. Please see below   Call [phone] to schedule the appointment.   Please signup for Plex, which is electronic access to your record if you have not done so.  All your results will post on there.  [email or web address]   You can NOW use [facility]ify[facility]EnglishUp to book your appointments with us, or consider using open access scheduling which is through the [place] website [email or web address] and type in Stephon Allen MD and follow the links for \"Schedule Online Now\"    To schedule Imaging or tests at [facility] call Central Scheduling [phone], Go to [facility] A ER Building (For example: CT scans, X rays, Ultrasound, MRI)  Cardiac Testing in ER building Building A second floor Cardiac Testing [phone] (For example: Holter Monitor, Cardiac Stress tests,Event Monitor, or 2D Echocardiograms)  [facility] call [phone] usually in [facility]  Walk in Clinic in [place] at [address] Mon-Fri at 8am-7:30 p.m., and Sat/Sun 9:00a.m.-4:30 p.m.  Also at [address] [place] [address]  Call [phone] for info     Please call our office about any questions regarding your treatment/medicines/tests as a result of today's visit.  For your safety, read the entire package insert of all medicines prescribed to you and be aware of all of the risks of treatment even beyond those discussed today.  All therapies have potential risk of harm or side effects or medication interactions.  It is your duty and for your safety to discuss with the pharmacist and our office with questions, and to notify us and stop treatment if problems arise, but know that our intention is that the benefits outweigh those potential risks and we strive to make you healthier and to improve your quality of life.    Referrals, and Radiology Information:    If your insurance requires a referral to a specialist, please allow 5 business days to  process your referral request.    If Stephon Allen MD orders a CT or MRI, it may take up to 10 business days to receive approval from your insurance company. Once our office has called informing you that the insurance company approved your testing, please call Central Scheduling at [phone]  Please allow our office 5 business days to contact you regarding any testing results.    Refill policies:   Allow 3 business days for refills; controlled substances may take longer and must be picked up from the office in person.  Narcotic medications can only be filled in 30 day increments and must be refilled at an office visit only.  If your prescription is due for a refill, you may be due for a follow-up appointment.  We cannot refill your maintenance medications at a preventative wellness visit.  To best provide you care, patients receiving maintenance medications need to be seen at least twice a year.

## 2025-05-12 NOTE — PROGRESS NOTES
Post Operative Visit Note       Active Problems  1. Post-operative state    2. Status post laparoscopic cholecystectomy         Chief Complaint   Chief Complaint   Patient presents with    Post-Op     PO - 4/27 W/JJS, LAPAROSCOPIC CHOLECYSTECTOMY WITH INDOCYANINE GREEN INJECTION, pt states he is just feeling sore, no other symptoms.           History of Present Illness   The patient presents for continued care and evaluation following a laparoscopic cholecystectomy with Dr. Luu on 4/27/2025. Overall, the patient is well doing postoperatively. He states he feels mild soreness around his incisional sites. He states he is not taking pain control. He states he is tolerating his diet well and staying away from fatty foods. He states he had one episode of diarrhea after his surgery but denies having episodes recently. He denies constipation. He denies nausea and vomiting. He denies fever and chills.    Allergies  Jabier is allergic to bees and vicodin [hydrocodone-acetaminophen].    Past Medical / Surgical / Social / Family History    The past medical and past surgical history have been reviewed by me today.     Past Medical History[1]  Past Surgical History[2]    The family history and social history have been reviewed by me today.    Family History[3]  Social Hx on file[4]   Medications - Current[5]      Review of Systems  The Review of Systems has been reviewed by me during today.  Review of Systems    Physical Findings   There were no vitals taken for this visit.  Physical Exam  Constitutional:       Appearance: Normal appearance.   Cardiovascular:      Rate and Rhythm: Normal rate and regular rhythm.   Pulmonary:      Effort: Pulmonary effort is normal.      Breath sounds: Normal breath sounds.   Abdominal:      General: There is no distension.      Palpations: Abdomen is soft.      Tenderness: There is no abdominal tenderness. There is no guarding or rebound.   Neurological:      Mental Status: He is alert.       Incisions: dry, in-tact, no erythema. Mild yellow bruising around incisional site at RLQ.       Assessment   1. Post-operative state    2. Status post laparoscopic cholecystectomy          Plan   - Patient is doing well post-operatively.  - May take tylenol or ibuprofen for incisional soreness as needed.  - Patient may shower and allow soap and water to wash over his incisional sites.  - Continue general diet as tolerated. Gradually increase spicy foods into diet.   - The patient should avoid lifting, pulling, pushing of force greater than 15 Ib 6 weeks post surgery.   - Patient was given work note from PCP without lifting restrictions.  - All questions and concerns were answered.    Nathan TAN       No orders of the defined types were placed in this encounter.      Imaging & Referrals   None    Follow Up  No follow-ups on file.    The patient was seen and examined with BRITNEY Cifuentes.    Overall, the patient is doing very well postoperatively.  He denies significant abdominal pain.  He states he has not required significant pain medication following his operation.    He is tolerating a bland diet.  He denies nausea or vomiting.    He had 1 episode of diarrhea following his operation.  His bowel movements have since returned to normal.    He did had a follow-up with his PCP today who provided him with return to work paperwork for next Monday, 5/19/2025.  He states his job does not require significant heavy lifting.    Clinical exam of the patient's abdomen reveals it to be soft, nondistended, nontender to palpation.  Laparoscopic incision sites are clean, dry, intact without surrounding erythema or cellulitis.  Mild surrounding ecchymosis in the right lower quadrant incision.    Plan:  I took the opportunity at today's visit to review the patient's pathology with him.  Pathology revealed chronic cholecystitis with multiple intraluminal mixed gallstones.  Activity restrictions were reviewed with the  patient.  No lifting, pushing, pulling of force of greater than 15 to 20 pounds for a total of 6 weeks postoperatively  The patient may shower.  He should avoid scrubbing his incisions, but may allow soap and water to wash over the incisions.  The patient may resume a general diet.  He has no dietary restrictions at this time  He may take ibuprofen and Tylenol as needed for pain management.  He may utilize heating pads or ice packs as needed for comfort measures  He may return to work next Monday, 5/19/2025 from a general surgical standpoint.  He was instructed to call our office if he requires any additional return to work paperwork  I have no further follow-up scheduled with this patient at this time.  He may see a physician assistant or Dr. Luu on an as-needed basis in the future    Josefa Mckeon PA-C  5/12/2025  11:37 AM           [1]   Past Medical History:   Allergic rhinitis    Arthritis    R shoulder    Atherosclerosis of coronary artery    Decorative tattoo    Dyslipidemia    H/O: pneumonia    High cholesterol    Hyperlipidemia    Leg swelling    After open heart surgery    Meningitis spinal (HCC)    MRSA infection    Obesity    Obesity (BMI 30-39.9)    JS (obstructive sleep apnea)    Not using cpap    Sleep apnea    Type 2 diabetes mellitus without complication, without long-term current use of insulin (HCC)    Vitamin D deficiency   [2]   Past Surgical History:  Procedure Laterality Date    Cabg  02/23/2023    Other surgical history      ear surgery as a child (5 years old)    Tonsillectomy     [3]   Family History  Problem Relation Age of Onset    Other (Other[other]) Father         sleep apnea    Dementia Paternal Grandfather         98yrs old    Cancer Neg     Heart Disease Neg     Stroke Neg    [4]   Social History  Socioeconomic History    Marital status:    Tobacco Use    Smoking status: Former     Current packs/day: 0.00     Average packs/day: 1 pack/day for 25.0 years (25.0  ttl pk-yrs)     Types: Cigarettes     Start date: 1/1/1985     Quit date: 1/1/2010     Years since quitting: 15.3     Passive exposure: Past    Smokeless tobacco: Current    Tobacco comments:     Uses Zynn nicotine pouches occasionally 1-2 per week   Vaping Use    Vaping status: Never Used   Substance and Sexual Activity    Alcohol use: No    Drug use: Never   Other Topics Concern    Caffeine Concern No    Exercise Yes    Seat Belt Yes    Special Diet No    Stress Concern Yes    Weight Concern Yes   [5]   Current Outpatient Medications:     aspirin 81 MG Oral Tab EC, Take 1 tablet (81 mg total) by mouth in the morning., Disp: , Rfl:     oxyCODONE 5 MG Oral Tab, Take 1 tablet (5 mg total) by mouth every 6 (six) hours as needed. (Patient not taking: Reported on 5/12/2025), Disp: 10 tablet, Rfl: 0    traZODone 50 MG Oral Tab, Take 1 tablet (50 mg total) by mouth nightly as needed for Sleep., Disp: 90 tablet, Rfl: 1    metoprolol succinate ER 50 MG Oral Tablet 24 Hr, Take 1 tablet (50 mg total) by mouth daily., Disp: 90 tablet, Rfl: 1    rosuvastatin 40 MG Oral Tab, Take 1 tablet (40 mg total) by mouth nightly., Disp: 90 tablet, Rfl: 3    Bacillus Coagulans-Inulin (PROBIOTIC) 1-250 BILLION-MG Oral Cap, Take 1 capsule by mouth in the morning., Disp: , Rfl:     cetirizine 10 MG Oral Tab, Take 1 tablet (10 mg total) by mouth in the morning., Disp: , Rfl:

## 2025-05-14 ENCOUNTER — TELEPHONE (OUTPATIENT)
Dept: FAMILY MEDICINE CLINIC | Facility: CLINIC | Age: 50
End: 2025-05-14

## 2025-05-14 NOTE — TELEPHONE ENCOUNTER
Received fax regarding patient, FAMILY MEDICAL LEAVE ACT Source. No RELEASE OF INFORMATION, no fee collected.

## 2025-05-20 NOTE — TELEPHONE ENCOUNTER
I called the patient for a few details. Did his leave start on 4/28? Did he return yesterday 5/19? I have an Family Medical Leave Act and a Return to work does he still need the Return to work filled out?

## 2025-05-21 NOTE — TELEPHONE ENCOUNTER
Patient called back to provide details, patient said he still need both forms completed.  Advised .       Type of Leave: continuous Family Medical Leave Act   Reason for Leave: surgery 4/27/25  Start date of leave: 4/26/25  End date of leave: 5/18/25 rtw 5/19/25  How many flare ups per month/length?:n  How many appts per month/length?: n  Was Fee and Turnaround info Given?: y

## 2025-05-28 RX ORDER — TRAZODONE HYDROCHLORIDE 50 MG/1
50 TABLET ORAL NIGHTLY PRN
Qty: 90 TABLET | Refills: 1 | Status: SHIPPED | OUTPATIENT
Start: 2025-05-28

## 2025-05-28 NOTE — TELEPHONE ENCOUNTER
Requesting Trazodone 50mg  Last OV: 5/12/25  RTC: prn  Last Rx'd 12/5/24 #90 with 1 refill    No future appointments.    Non-protocol med:  Rx pended and routed for approval/denial

## 2025-05-29 NOTE — TELEPHONE ENCOUNTER
Forms completed and signed. Forms E-faxed to Family Medical Leave Act source [phone]. Fax confirmation received. Forms sent to patient via [employer].

## 2025-07-12 RX ORDER — METOPROLOL SUCCINATE 50 MG/1
50 TABLET, EXTENDED RELEASE ORAL DAILY
Qty: 90 TABLET | Refills: 1 | Status: SHIPPED | OUTPATIENT
Start: 2025-07-12

## 2025-07-12 NOTE — TELEPHONE ENCOUNTER
Name from pharmacy: METOPROLOL ER SUCCINATE 50MG TABS          Will file in chart as: METOPROLOL SUCCINATE ER 50 MG Oral Tablet 24 Hr    Sig: TAKE 1 TABLET(50 MG) BY MOUTH DAILY    Disp: 90 tablet    Refills: 1 (Pharmacy requested: Not specified)    Start: 7/12/2025    Class: Normal    Non-formulary    Last ordered: 8 months ago (10/19/2024) by Stephon Allen MD    Last refill: 6/9/2025    Rx #: [id number]    Hypertension Medications Protocol [id number]/12/2025 03:28 AM   Protocol Details CMP or BMP in past 12 months    Last BP reading less than 140/90    In person appointment or virtual visit in the past 12 mos or appointment in next 3 mos    EGFRCR or GFRNAA > 50    Medication is active on med list      To be filled at: [id number] - [place], [place] - [address] [place] [facility] [address], [phone], [phone]

## 2025-07-22 ENCOUNTER — ANESTHESIA (OUTPATIENT)
Dept: ENDOSCOPY | Facility: HOSPITAL | Age: 50
End: 2025-07-22
Payer: COMMERCIAL

## 2025-07-22 ENCOUNTER — HOSPITAL ENCOUNTER (OUTPATIENT)
Facility: HOSPITAL | Age: 50
Setting detail: HOSPITAL OUTPATIENT SURGERY
Discharge: HOME | End: 2025-07-22
Attending: INTERNAL MEDICINE | Admitting: INTERNAL MEDICINE
Payer: COMMERCIAL

## 2025-07-22 ENCOUNTER — ANESTHESIA EVENT (OUTPATIENT)
Dept: ENDOSCOPY | Facility: HOSPITAL | Age: 50
End: 2025-07-22
Payer: COMMERCIAL

## 2025-07-22 VITALS
SYSTOLIC BLOOD PRESSURE: 109 MMHG | DIASTOLIC BLOOD PRESSURE: 71 MMHG | RESPIRATION RATE: 16 BRPM | TEMPERATURE: 98 F | OXYGEN SATURATION: 98 % | BODY MASS INDEX: 33.6 KG/M2 | WEIGHT: 240 LBS | HEIGHT: 71 IN | HEART RATE: 64 BPM

## 2025-07-22 PROCEDURE — 88342 IMHCHEM/IMCYTCHM 1ST ANTB: CPT | Performed by: INTERNAL MEDICINE

## 2025-07-22 PROCEDURE — 88305 TISSUE EXAM BY PATHOLOGIST: CPT | Performed by: INTERNAL MEDICINE

## 2025-07-22 DEVICE — IMPLANTABLE DEVICE: Type: IMPLANTABLE DEVICE

## 2025-07-22 RX ORDER — NALOXONE HYDROCHLORIDE 0.4 MG/ML
80 INJECTION, SOLUTION INTRAMUSCULAR; INTRAVENOUS; SUBCUTANEOUS AS NEEDED
Status: DISCONTINUED | OUTPATIENT
Start: 2025-07-22 | End: 2025-07-22

## 2025-07-22 RX ORDER — SODIUM CHLORIDE, SODIUM LACTATE, POTASSIUM CHLORIDE, CALCIUM CHLORIDE 600; 310; 30; 20 MG/100ML; MG/100ML; MG/100ML; MG/100ML
INJECTION, SOLUTION INTRAVENOUS CONTINUOUS
Status: DISCONTINUED | OUTPATIENT
Start: 2025-07-22 | End: 2025-07-22

## 2025-07-22 RX ORDER — LIDOCAINE HYDROCHLORIDE 10 MG/ML
INJECTION, SOLUTION EPIDURAL; INFILTRATION; INTRACAUDAL; PERINEURAL AS NEEDED
Status: DISCONTINUED | OUTPATIENT
Start: 2025-07-22 | End: 2025-07-22 | Stop reason: SURG

## 2025-07-22 RX ADMIN — SODIUM CHLORIDE, SODIUM LACTATE, POTASSIUM CHLORIDE, CALCIUM CHLORIDE: 600; 310; 30; 20 INJECTION, SOLUTION INTRAVENOUS at 12:07:00

## 2025-07-22 RX ADMIN — SODIUM CHLORIDE, SODIUM LACTATE, POTASSIUM CHLORIDE, CALCIUM CHLORIDE: 600; 310; 30; 20 INJECTION, SOLUTION INTRAVENOUS at 11:36:00

## 2025-07-22 RX ADMIN — LIDOCAINE HYDROCHLORIDE 50 MG: 10 INJECTION, SOLUTION EPIDURAL; INFILTRATION; INTRACAUDAL; PERINEURAL at 11:36:00

## 2025-07-22 NOTE — ANESTHESIA POSTPROCEDURE EVALUATION
[facility]    Jabier Patel Patient Status:  Hospital Outpatient Surgery   Age/Gender 49 year old male MRN [id number]   Location [facility] Attending Tino Napier MD   Hosp Day # 0 PCP Stephon Allen MD       Anesthesia Post-op Note    COLONOSCOPY WITH  COLD SNARE POLYPECTOMY, BIOPSIES, TATTOO INJECTION AND CLIP PLACCEMENT x2  AND ENDOSCOPIC ULTRASOUND    Procedure Summary       Date: 07/22/25 Room / Location:  ENDOSCOPY 01 /  ENDOSCOPY    Anesthesia Start: 1132 Anesthesia Stop: 1216    Procedures:       COLONOSCOPY WITH  COLD SNARE POLYPECTOMY, BIOPSIES, TATTOO INJECTION AND CLIP PLACCEMENT x2  AND ENDOSCOPIC ULTRASOUND      ENDOSCOPIC ULTRASOUND (EUS) Diagnosis: (colon: polyps,  eus: rectal polyp)    Surgeons: Tino Napier MD Anesthesiologist: Graham Whitt MD    Anesthesia Type: MAC ASA Status: 3            Anesthesia Type: MAC    Vitals Value Taken Time   /78 07/22/25 12:18   Temp   07/22/25 12:19   Pulse 73 07/22/25 12:19   Resp 16 07/22/25 12:15   SpO2 98 % 07/22/25 12:19   Vitals shown include unfiled device data.        Patient Location: Endoscopy    Anesthesia Type: MAC    Airway Patency: patent    Postop Pain Control: adequate    Mental Status: preanesthetic baseline    Nausea/Vomiting: none    Cardiopulmonary/Hydration status: stable euvolemic    Complications: no apparent anesthesia related complications    Postop vital signs: stable    Dental Exam: Unchanged from Preop    Patient to be discharged from PACU when criteria met.

## 2025-07-22 NOTE — DISCHARGE INSTRUCTIONS
Home Care Instructions for Colonoscopy  with Sedation    Diet:  - Resume your regular diet as tolerated.  - Start with light meals to minimize bloating.  - Do not drink alcohol today.    Medication:  - If you have questions about resuming your normal medications, please contact your Primary Care Physician.    Activities:  - Take it easy today. Do not return to work today.  - Do not drive today.  - Do not operate any machinery today (including kitchen equipment).    Colonoscopy:  - You may notice some rectal \"spotting\" (a little blood on the toilet tissue) for a day or two after the exam. This is normal.  - If you experience any rectal bleeding (not spotting), persistent tenderness or sharp severe abdominal pains, oral temperature over 100 degrees Fahrenheit, light-headedness or dizziness, or any other problems, contact your doctor.    **If unable to reach your doctor, please go to the [facility] Emergency Room**    - Your referring physician will receive a full report of your examination.  - If you do not hear from your doctor's office within two weeks of your biopsy, please call them for your results.

## 2025-07-22 NOTE — ANESTHESIA PREPROCEDURE EVALUATION
PRE-OP EVALUATION    Patient Name: Jabier Patel    Admit Diagnosis: rectal polyp    Pre-op Diagnosis: rectal polyp    COLONOSCOPY WITH ENDOSCOPIC MUCOSAL RESECTION    Anesthesia Procedure: COLONOSCOPY WITH ENDOSCOPIC MUCOSAL RESECTION    Surgeons and Role:     * Tino Napier MD - Primary    Pre-op vitals reviewed.  Temp: 97.7 °F (36.5 °C)  Pulse: 60  Resp: 18  BP: 120/90  SpO2: 96 %  Body mass index is 33.47 kg/m².    Current medications reviewed.  Hospital Medications:  Current Medications[1]    Outpatient Medications:   Prescriptions Prior to Admission[2]    Allergies: Bees and Vicodin [hydrocodone-acetaminophen]      Anesthesia Evaluation    Patient summary reviewed.    Anesthetic Complications  (-) history of anesthetic complications         GI/Hepatic/Renal      (-) GERD                           Cardiovascular        Exercise tolerance: good     MET: >4    (+) obesity  (+) hypertension     (+) CAD    (+) CABG/stent              (-) angina     (-) LAZO         Endo/Other      (+) diabetes                            Pulmonary      (-) asthma  (-) COPD       (-) shortness of breath     (+) sleep apnea and CPAP      Neuro/Psych                              Patient Active Problem List:     JS (obstructive sleep apnea)     Gout     Blood pressure elevated without history of HTN     BPH (benign prostatic hyperplasia)     Dyslipidemia     Obesity (BMI 30-39.9)     YE (acute kidney injury)     Vitamin D deficiency     Therapeutic drug monitoring     Mild ascending aorta dilatation     CAD (coronary artery disease)     H/O: gout     Hypertension     Pleural effusion     Chest pain, exertional     Edema of extremities     Athscl heart disease of native coronary artery w/o ang pctrs     Body mass index (BMI) of 33.0-33.9 in adult     Benign essential HTN     Personal history of pneumonia (recurrent)     Pure hypercholesterolemia, unspecified     Personal history of nicotine dependence     Problems  related to living alone     Presence of aortocoronary bypass graft     Obstructive sleep apnea (adult) (pediatric)     Encntr for surgical aftcr following surgery on the circ sys     History of falling     Long term (current) use of aspirin     Long term (current) use of oral hypoglycemic drugs     Obesity, unspecified     Acute cholecystitis     Hypernatremia     Elevated blood pressure reading with diagnosis of hypertension            Past Surgical History[3]  Social Hx on file[4]  History   Drug Use Unknown     Available pre-op labs reviewed.  Lab Results   Component Value Date    WBC 6.0 04/27/2025    RBC 4.29 (L) 04/27/2025    HGB 13.4 04/27/2025    HCT 38.5 (L) 04/27/2025    MCV 89.7 04/27/2025    MCH 31.2 04/27/2025    MCHC 34.8 04/27/2025    RDW 12.4 04/27/2025    .0 04/27/2025     Lab Results   Component Value Date     04/27/2025    K 4.3 04/27/2025     04/27/2025    CO2 25.0 04/27/2025    BUN 17 04/27/2025    CREATSERUM 1.37 (H) 04/27/2025     (H) 04/27/2025    CA 8.8 04/27/2025            Airway      Mallampati: III  Mouth opening: >3 FB  TM distance: 4 - 6 cm  Neck ROM: full Cardiovascular    Cardiovascular exam normal.  Rhythm: regular  Rate: normal     Dental  Comment: Patient denies any loose/missing/cracked teeth. No gross abnormalities or loose teeth noted on exam.      Dentition appears grossly intact         Pulmonary    Pulmonary exam normal.                 Other findings              ASA: 3   Plan: MAC  NPO status verified and patient meets guidelines.    Post-procedure pain management plan discussed with surgeon and patient.    Comment:     A detailed discussion about the anesthetic plan was held with Jabier Patel in the preoperative area. Benefits and risks of MAC anesthesia were discussed, including intraoperative awareness/recall, PONV, reasonable expectations of post-operative pain/discomfort, aspiration, conversion to general anesthesia, dental injury,  pressure/nerve injuries from positioning, and other serious but rare complications (life-threatening cardiopulmonary events). All questions were answered appropriately and patient demonstrated understanding of realistic expectations and risks of undergoing anesthesia. Jabier Patel consents to receiving anesthesia and wishes to proceed.  Plan/risks discussed with: patient                Present on Admission:  **None**             [1]    lactated ringers infusion   Intravenous Continuous   [2]   Medications Prior to Admission   Medication Sig Dispense Refill Last Dose/Taking    METOPROLOL SUCCINATE ER 50 MG Oral Tablet 24 Hr TAKE 1 TABLET(50 MG) BY MOUTH DAILY 90 tablet 1 7/22/2025 at  4:30 AM    TRAZODONE 50 MG Oral Tab TAKE 1 TABLET(50 MG) BY MOUTH EVERY NIGHT AS NEEDED FOR SLEEP (Patient taking differently: Take by mouth nightly as needed for Sleep.) 90 tablet 1 7/20/2025    aspirin 81 MG Oral Tab EC Take 1 tablet (81 mg total) by mouth in the morning.   7/21/2025 at  5:00 AM    rosuvastatin 40 MG Oral Tab Take 1 tablet (40 mg total) by mouth nightly. 90 tablet 3 7/21/2025    Bacillus Coagulans-Inulin (PROBIOTIC) 1-250 BILLION-MG Oral Cap Take 1 capsule by mouth in the morning.   7/20/2025    cetirizine 10 MG Oral Tab Take 1 tablet (10 mg total) by mouth in the morning.   7/20/2025   [3]   Past Surgical History:  Procedure Laterality Date    Angiogram  Jan 2023    Cabg  02/23/2023    Cholecystectomy  04/27/2025    Laparoscopic cholecystectomy  04/27/2025    Other surgical history      ear surgery as a child (5 years old)    Tonsillectomy     [4]   Social History  Socioeconomic History    Marital status:    Tobacco Use    Smoking status: Former     Current packs/day: 0.00     Average packs/day: 1 pack/day for 25.0 years (25.0 ttl pk-yrs)     Types: Cigarettes     Start date: 1/1/1985     Quit date: 1/1/2010     Years since quitting: 15.5     Passive exposure: Past    Smokeless tobacco: Current     Tobacco comments:     Uses Zynn nicotine pouches occasionally 1-2 per week   Vaping Use    Vaping status: Never Used   Substance and Sexual Activity    Alcohol use: No    Drug use: Never   Other Topics Concern    Caffeine Concern No    Exercise No    Seat Belt No    Special Diet No    Stress Concern No    Weight Concern No

## 2025-07-23 NOTE — H&P
History & Physical Examination    Patient Name: Jabier Patel  MRN: [id number]  [facility]: [id number]  YOB: 1975    Diagnosis: rectal polyp      Present Illness:  Jabier Patel is a 49 year old male is here rectal polyp.    Body mass index is 33.47 kg/m².    Past Medical History[1]    Procedure: colonoscopy    Physician Pre-Sedation Assessment    Pre-Sedation Assessment:    Sedation History: Airway Assessed    ASA Classification: 2. Patient with mild systemic disease    Cardiac:    Respiratory:    Abdomen:      [place] [facility] [place] Medications[2]    Allergies: Allergies[3]    Past Surgical History[4]  Family History[5]  Social History     Tobacco Use    Smoking status: Former     Current packs/day: 0.00     Average packs/day: 1 pack/day for 25.0 years (25.0 ttl pk-yrs)     Types: Cigarettes     Start date: 1/1/1985     Quit date: 1/1/2010     Years since quitting: 15.5     Passive exposure: Past    Smokeless tobacco: Current    Tobacco comments:     Uses Zynn nicotine pouches occasionally 1-2 per week   Substance Use Topics    Alcohol use: No       SYSTEM Check if Review is Normal Check if Physical Exam is Normal If not normal, please explain:   HEENT [x ] [x ]    NECK & BACK [x ] [x ]    HEART [x ] [x ]    LUNGS [x ] [x ]    ABDOMEN [x ] [x ]    UROGENITAL [ ] [ ]    EXTREMITIES [ ] [ ]    OTHER        [ x ] I have discussed the risks and benefits and alternatives with the patient/family.  They understand and agree to proceed with plan of care.  [ x ] I have reviewed the History and Physical done within the last 30 days.  Any changes noted above.    Tino Napier MD  7/22/2025  10:57 PM             [1]   Past Medical History:   Allergic rhinitis    Arthritis    R shoulder    Atherosclerosis of coronary artery    Coronary atherosclerosis    HX OF TRIPLE BYPASS    Decorative tattoo    Dyslipidemia    H/O: pneumonia    High blood pressure    High cholesterol    Hyperlipidemia     Leg swelling    After open heart surgery    Meningitis spinal (HCC)    MRSA infection    Obesity    Obesity (BMI 30-39.9)    JS (obstructive sleep apnea)    Not using cpap    Sleep apnea    CPAP    Type 2 diabetes mellitus without complication, without long-term current use of insulin (HCC)    Visual impairment    CONTACTS/GLASSES    Vitamin D deficiency   [2]   No current facility-administered medications for this encounter.   [3]   Allergies  Allergen Reactions    Bees ANAPHYLAXIS     Bee stings-has epinephrine pen    Vicodin [Hydrocodone-Acetaminophen] ITCHING   [4]   Past Surgical History:  Procedure Laterality Date    Angiogram  Jan 2023    Cabg  02/23/2023    Cholecystectomy  04/27/2025    Laparoscopic cholecystectomy  04/27/2025    Other surgical history      ear surgery as a child (5 years old)    Tonsillectomy     [5]   Family History  Problem Relation Age of Onset    Other (Other[other]) Father         sleep apnea    Dementia Paternal Grandfather         98yrs old    Cancer Neg     Heart Disease Neg     Stroke Neg

## 2025-07-23 NOTE — OPERATIVE REPORT
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; here to consider endoscopic mucosal resection (EMR)  POSTOPERATIVE DIAGNOSES   Colonoscopy  3 small polyps (6-7mm) in AC, DC and sigmoid colon removed with cold snare  Deep tattoo marking at 30cms and prior polypectomy site without residual polyp  Sigmoid diverticulosis  Polyp at 20cms from anal verge with faint tattoo  Firm, sessile polyp was centrally depressed with pit pattern Kudo V suggestive of malignant polyp  Repeat biopsies from central depressed area taken  Rectal EUS  Malignant appearing polyp appeared to be contained without the submucosal layer - stage T1N0  PROCEDURE PERFORMED: Colonoscopy to cecum with cold snare polypectomy and rectal ultrasound   PHYSICIAN: DR. CHRISTINE  SCOPE UTILIZED: Adult Olympus Colonoscope with distal cap; endoscopic ultrasound  SEDATION MEDICATIONS: MAC   CECAL WITHDRAWAL TIME= 20 mins  DURATION of CONSCIOUS SEDATION: deep sedation provided by anesthesiologist  PREPROCEDURE ASSESSMENT: The indication for this procedure is to assess for EMR. The patient was identified by myself and nursing staff in the exam room. Informed consent was obtained. The patient was seen in clinic and a full H&P was obtained. On brief physical examination, airway is patent. Chest is clear. Heart has regular rate and rhythm. Abdomen is soft, nontender with good bowel sounds. A medication list was taken by nursing today and reviewed by myself. The patient is an ASA grade 2.  Due to the technical nature of the procedure, pathology of the anal area could be missed.  PROCEDURE NOTE: The procedure was completed without  difficulty. The patient tolerated the procedure well. The prep was good.  The colonoscope was inserted through the anus and advanced to the level of the cecum with visualization and photo  documentation of the appendix. A slow withdrawal of the colonoscope was performed as well as retroflexion in the rectum. Small polyps in the colon were noted and removed. The polyp in question was about 20cms from anal verge.  Polyp was firm, centrally depressed and pit pattern kudo V was concerning for malignant polyp. Rectal ultrasound was performed with radial echoendoscope.  No perirectal or pericolonic nodes were noted. Polyp was noted as a hypoechoic mass involving the submucosa. Muscularis propria was not involved. Polyp was on anterolateral wall. It was cephalad to the urinary bladder. Polyp is not a candidate for EMR due to T1 lesion (typically Tis would be amenable for EMR); repeat biopsies from central area were taken. Since only faint tattoo was noted, repeat SPOT tattoo was administered. Clips x 2 were placed across from the polyp to identify the polyp on CT scan. No other polyps, masses or lesions were found throughout the colon. Sigmoid diverticulosis was noted. Small internal hemorrhoids were noted. There were no immediate complications.   FINDINGS   Polyp at 20cms was not amenable to EMR due to malignant appearance- stage T1N0 if proven to be malignant  Polyp site repeat tattoo performed and clips placed to identify exact location of polyp if CT scan needed  RECOMMENDATIONS: discussed with Dr. Jones   DISCHARGE: The patient was given an after visit summary detailing the procedure, findings, recommendations, f/u plan and an updated medication list.   PREP Quality indicators:  Aronchick scale    EXCELLENT - small volume of clear liquid > 95% of mucosa see  GOOD  - clear liquid covering up to 25% of mucosa, but > 90% of mucosa seen  FAIR  - some semisolid stool could be suctioned but > 90% of mucosa seen  POOR  - semisolid stool could not be suctioned and < 90% of mucosal seen  INADEQUATE- repeat preparation needed      Thank you very much for the consultation.  I really appreciate it.    Tino  MD Quyen

## 2025-08-15 LAB — Lab: NORMAL

## 2025-08-20 ENCOUNTER — OFFICE VISIT (OUTPATIENT)
Dept: FAMILY MEDICINE CLINIC | Facility: CLINIC | Age: 50
End: 2025-08-20

## 2025-08-20 VITALS
RESPIRATION RATE: 16 BRPM | DIASTOLIC BLOOD PRESSURE: 78 MMHG | HEIGHT: 71 IN | HEART RATE: 60 BPM | TEMPERATURE: 98 F | OXYGEN SATURATION: 98 % | WEIGHT: 257 LBS | BODY MASS INDEX: 35.98 KG/M2 | SYSTOLIC BLOOD PRESSURE: 128 MMHG

## 2025-08-20 DIAGNOSIS — Z01.818 PREOPERATIVE CLEARANCE: Primary | ICD-10-CM

## 2025-08-20 DIAGNOSIS — C18.7 ADENOCARCINOMA OF SIGMOID COLON (HCC): ICD-10-CM

## 2025-08-20 PROCEDURE — 3078F DIAST BP <80 MM HG: CPT | Performed by: FAMILY MEDICINE

## 2025-08-20 PROCEDURE — 3074F SYST BP LT 130 MM HG: CPT | Performed by: FAMILY MEDICINE

## 2025-08-20 PROCEDURE — 3008F BODY MASS INDEX DOCD: CPT | Performed by: FAMILY MEDICINE

## 2025-08-20 PROCEDURE — G2211 COMPLEX E/M VISIT ADD ON: HCPCS | Performed by: FAMILY MEDICINE

## 2025-08-20 PROCEDURE — 99214 OFFICE O/P EST MOD 30 MIN: CPT | Performed by: FAMILY MEDICINE

## 2025-09-01 ENCOUNTER — PATIENT OUTREACH (OUTPATIENT)
Dept: CASE MANAGEMENT | Age: 50
End: 2025-09-01

## (undated) DEVICE — SUTURE WIRE DOUBLE STERNOTOMY

## (undated) DEVICE — TROCAR: Brand: KII FIOS FIRST ENTRY

## (undated) DEVICE — LAPCLINCH GRASPER TIP, DISPOSABLE: Brand: RENEW

## (undated) DEVICE — SUT POLYDEK 2-0 ME-2 69-414

## (undated) DEVICE — COVER,LIGHT,CAMERA,HARD,1/PK,STRL: Brand: MEDLINE

## (undated) DEVICE — GLOVE SUR 7 SENSICARE PI PIP CRM PWD F

## (undated) DEVICE — SUT PROLENE 6-0 C-1 M8718

## (undated) DEVICE — DALE ABDOMINAL BINDER, 12" WIDE, STRETCHES TO FIT 30"-45", 1 PER BOX.: Brand: DALE ABDOMINAL BINDER

## (undated) DEVICE — SLEEVE COMPR MD KNEE LEN SGL USE KENDALL SCD

## (undated) DEVICE — CONTAINER CELL SAVER 600ML

## (undated) DEVICE — L-HOOK CAUTERY PROBE TIP, DISPOSABLE: Brand: RENEW

## (undated) DEVICE — CLIP APPLIER WITH CLIP LOGIC TECHNOLOGY: Brand: ENDO CLIP III

## (undated) DEVICE — OPEN HEART: Brand: MEDLINE INDUSTRIES, INC.

## (undated) DEVICE — LAPAROVUE VISIBILITY SYSTEM LAPAROSCOPIC SOLUTIONS: Brand: LAPAROVUE

## (undated) DEVICE — CELL SAVER RESERVOIR

## (undated) DEVICE — SUT MCRYL 4-0 18IN PS-2 ABSRB UD 19MM 3/8 CIR

## (undated) DEVICE — SUT PROLENE 8-0 BV130-5 8730H

## (undated) DEVICE — CATHETER URET 5FR L70CM FLX OPN TIP NONPORTED

## (undated) DEVICE — GUIDEWIRE .035X150 STR ZIPWIRE

## (undated) DEVICE — SHEET,DRAPE,40X58,STERILE: Brand: MEDLINE

## (undated) DEVICE — SOL NACL IRRIG 0.9% 1000ML BTL

## (undated) DEVICE — C-ARM: Brand: UNBRANDED

## (undated) DEVICE — 40580 - THE PINK PAD - ADVANCED TRENDELENBURG POSITIONING KIT: Brand: 40580 - THE PINK PAD - ADVANCED TRENDELENBURG POSITIONING KIT

## (undated) DEVICE — KIT MFLD FOR SPEC COLL

## (undated) DEVICE — Device: Brand: SUTURE PASSOR PRO

## (undated) DEVICE — SYRINGE MED 10ML LL TIP W/O SFTY DISP

## (undated) DEVICE — GLOVE SUR 7.5 SENSICARE PI PIP GRN PWD F

## (undated) DEVICE — POUCH SPECIMEN WIRE 6X3 250ML

## (undated) DEVICE — ENDOCUT SCISSOR TIP, DISPOSABLE: Brand: RENEW

## (undated) DEVICE — SYRINGE 30ML LL TIP

## (undated) DEVICE — Device: Brand: VIRTUOSAPH PLUS WITH RADIAL INDICATION

## (undated) DEVICE — KIT CUSTOM ENDOPROCEDURE STERIS

## (undated) DEVICE — TROCAR: Brand: KII® SLEEVE

## (undated) DEVICE — SNARE OVL ROT 10MM  230CM CLD ONLY

## (undated) DEVICE — CAP E SEAL 15X4MM YEL DST ATTCH

## (undated) DEVICE — TRADITIONAL MARYLAND DISSECTOR TIP, DISPOSABLE: Brand: RENEW

## (undated) DEVICE — SUT PDS II 0 L27IN ABSRB VLT L26MM CT-2

## (undated) DEVICE — GLOVE SUR 7 SENSICARE PIP WHT PWD F

## (undated) DEVICE — DRIVER DRILL ZDRIVE

## (undated) DEVICE — LAP CHOLE/APPY CDS-LF: Brand: MEDLINE INDUSTRIES, INC.

## (undated) DEVICE — ADHESIVE SKIN TOP FOR WND CLSR DERMBND ADV

## (undated) DEVICE — SOL LACT RINGERS 1000ML

## (undated) DEVICE — NEEDLE INJ 25GA CATH 230CM CHN 2.8MM ACUJECT

## (undated) DEVICE — Device

## (undated) DEVICE — BALLOON HEMOSTATIC EUS LINEAR

## (undated) DEVICE — STERILE POLYISOPRENE POWDER-FREE SURGICAL GLOVES: Brand: PROTEXIS

## (undated) DEVICE — ELECTRODE EKG W3.5XL4CM ABRAD W1.25XL1.5IN

## (undated) DEVICE — GOWN,SIRUS,FAB REINF,RAGLAN,XL,STERILE: Brand: MEDLINE

## (undated) DEVICE — [HIGH FLOW INSUFFLATOR,  DO NOT USE IF PACKAGE IS DAMAGED,  KEEP DRY,  KEEP AWAY FROM SUNLIGHT,  PROTECT FROM HEAT AND RADIOACTIVE SOURCES.]: Brand: PNEUMOSURE

## (undated) DEVICE — ENDOPATH ULTRA VERESS INSUFFLATION NEEDLES WITH LUER LOCK CONNECTORS: Brand: ENDOPATH

## (undated) DEVICE — SOLUTION IRRIG 1000ML 0.9% NACL USP BTL

## (undated) DEVICE — KIT VLV 5 PC AIR H2O SUCT BX ENDOGATOR CONN

## (undated) DEVICE — CANISTER SUCT 1200CC LID BLU HRD ADPT AUTO

## (undated) DEVICE — CANNULA NSL AD W/ FLTR 14FT O2/CO2

## (undated) DEVICE — SUT SILK 2 TIES SA8H

## (undated) NOTE — MR AVS SNAPSHOT
[facility] [address]  [address] [facility] [place]  [phone]               Thank you for choosing us for your health care visit with Noah Chambers MD.  We are glad to serve you and happy to provide you with t show that the risk of death goes up the heavier a person is. Weight Loss GUIDANCE and ADVICE from: The Obesity Forum 2014 [address] [facility]    The TRUTH about Weight Loss: These are studied proven facts.   1. 96% of patients tryin calorie counter. Write down the foods you are eating and bring them to your office visit with me.     6. Exercise must be intense- that is breaking a sweat, and breathlessness such that you can talk but cannot sing, elevated heart rate- and again time is M you have not done so.  All your results will post on there.  [email or web address]  • You can NOW use the [employer] to book your appointments with us, or consider to use open access scheduling which is through the [employer] website [email or web address] a to receive approval from your insurance company.  Once our office has called informing you that the insurance company approved your testing, please call Central Scheduling at [phone]  Please allow our office 5 business days to contact you regarding any You can access your MyChart to more actively manage your health care and view more details from this visit by going to [email or web address].   If you've recently had a stay at the Hospital you can access your discharge instructions in [address] by nikki

## (undated) NOTE — LETTER
Date & Time: 3/5/2019, 8:55 AM  Patient: Lara Richard  Encounter Provider(s):    SATURNINO Victoria       To Whom It May Concern:    Gaviota Baer was seen and treated in our department on 3/5/2019. He can return to work 3/7/19.     If you ha

## (undated) NOTE — MR AVS SNAPSHOT
[facility] [address]  [address] [facility]  [phone]               Thank you for choosing us for your health care visit with Franky Cr MD.  We are glad to serve you and happy to provide you with t •[facility] call [phone] usually in [address] [facility] in [place] at [facility].  [address] Mon-Fri at 8am-7:30pm, and Sat/Sun 9am-430pm  Also at [address]  Call [phone] for info    • Please call our office about Pulmonary Hypertension  Pulmonary hypertension is high pressure in the blood vessels that carry blood into the lungs. This strains the lungs and heart and can lead to serious problems.   Systemic hypertension means the pressure is too high in blood vessels problems such as infection. · A chest X-ray. This takes a picture of the inside of the chest. It can show certain heart and lung problems. · An electrocardiogram (ECG or EKG). This test records the heart’s electrical activity.   · An echocardiogram (echo) Bees Anaphylaxis    Bee stings-has epinephrine pen    Vicodin [Hydrocodone-Acetaminophen] Itching    itching                Today's Vital Signs     BP Pulse Temp Height Weight BMI    132/88 mmHg 70 97.9 °F (36.6 °C) (Temporal) 71\" 245 lb 34.19 kg/m2 information, go to [email or web address] and click on the Sign Up Now link in the [employer] box. Enter your [employer] Activation Code exactly as it appears below along with your Zip Code and Date of Birth to complete the sign-up process.  If you do

## (undated) NOTE — Clinical Note
Date & Time: 4/27/2025, 1:54 PM  Patient: Jabier Patel  Encounter Provider(s):    Jaylen Medeiros DO Ragothaman, Arun, MD Ghelani, Neal, DO       To Whom It May Concern:    Jabier Patel was seen and treated in our department on 4/26/2025. He {Return to school/sport/work:[id number]}.    If you have any questions or concerns, please do not hesitate to call.        _____________________________  Physician/APC Signature

## (undated) NOTE — LETTER
Date & Time: 8/20/2024, 9:59 AM  Patient: Jabier Patel  Encounter Provider(s):    Wally Davis APRN       To Whom It May Concern:    Jabier Patel was seen and treated in our department on 8/20/2024. He return to work once symptoms have improved and remains fever free for 24 hours.    If you have any questions or concerns, please do not hesitate to call.        _____________________________  Physician/APC Signature

## (undated) NOTE — LETTER
[address]  [address]  Authorization for Surgical Operation and Procedure     Date:___________                                                                                                         Time:__________  I hereby authorize Surgeon(s):  Kt Luu MD, my physician and his/her assistants (if applicable), which may include medical students, residents, and/or fellows, to perform the following surgical operation/ procedure and administer such anesthesia as may be determined necessary by my physician:  Operation/Procedure name (s) Procedure(s):  LAPAROSCOPIC CHOLECYSTECTOMY WITH INDOCYANINE GREEN INJECTION POSSIBLE OPEN on Jabier Patel   2.   I recognize that during the surgical operation/procedure, unforeseen conditions may necessitate additional or different procedures than those listed above.  I, therefore, further authorize and request that the above-named surgeon, assistants, or designees perform such procedures as are, in their judgment, necessary and desirable.    3.   My surgeon/physician has discussed prior to my surgery the potential benefits, risks and side effects of this procedure; the likelihood of achieving goals; and potential problems that might occur during recuperation.  They also discussed reasonable alternatives to the procedure, including risks, benefits, and side effects related to the alternatives and risks related to not receiving this procedure.  I have had all my questions answered and I acknowledge that no guarantee has been made as to the result that may be obtained.    4.   Should the need arise during my operation/procedure, which includes change of level of care prior to discharge, I also consent to the administration of blood and/or blood products.  Further, I understand that despite careful testing and screening of blood or blood products by collecting agencies, I may still be subject to ill effects as a result of  receiving a blood transfusion and/or blood products.  The following are some, but not all, of the potential risks that can occur: fever and allergic reactions, hemolytic reactions, transmission of diseases such as Hepatitis, AIDS and Cytomegalovirus (CMV) and fluid overload.  In the event that I wish to have an autologous transfusion of my own blood, or a directed donor transfusion, I will discuss this with my physician.  Check only if Refusing Blood or Blood Products  I understand refusal of blood or blood products as deemed necessary by my physician may have serious consequences to my condition to include possible death. I hereby assume responsibility for my refusal and release the hospital, its personnel, and my physicians from any responsibility for the consequences of my refusal.          o  Refuse      5.   I authorize the use of any specimen, organs, tissues, body parts or foreign objects that may be removed from my body during the operation/procedure for diagnosis, research or teaching purposes and their subsequent disposal by hospital authorities.  I also authorize the release of specimen test results and/or written reports to my treating physician on the hospital medical staff or other referring or consulting physicians involved in my care, at the discretion of the Pathologist or my treating physician.    6.   I consent to the photographing or videotaping of the operations or procedures to be performed, including appropriate portions of my body for medical, scientific, or educational purposes, provided my identity is not revealed by the pictures or by descriptive texts accompanying them.  If the procedure has been photographed/videotaped, the surgeon will obtain the original picture, image, videotape or CD.  The hospital will not be responsible for storage, release or maintenance of the picture, image, tape or CD.    7.   I consent to the presence of a  or observers in the operating room  as deemed necessary by my physician or their designees.    8.   I recognize that in the event my procedure results in extended X-Ray/fluoroscopy time, I may develop a skin reaction.    9. If I have a Do Not Attempt Resuscitation (DNAR) order in place, that status will be suspended while in the operating room, procedural suite, and during the recovery period unless otherwise explicitly stated by me (or a person authorized to consent on my behalf). The surgeon or my attending physician will determine when the applicable recovery period ends for purposes of reinstating the DNAR order.  10. Patients having a sterilization procedure: I understand that if the procedure is successful the results will be permanent and it will therefore be impossible for me to inseminate, conceive, or bear children.  I also understand that the procedure is intended to result in sterility, although the result has not been guaranteed.   11. I acknowledge that my physician has explained sedation/analgesia administration to me including the risk and benefits I consent to the administration of sedation/analgesia as may be necessary or desirable in the judgment of my physician.    I CERTIFY THAT I HAVE READ AND FULLY UNDERSTAND THE ABOVE CONSENT TO OPERATION and/or OTHER PROCEDURE.    _________________________________________  __________________________________  Signature of Patient     Signature of Responsible Person         ___________________________________         Printed Name of Responsible Person           _________________________________                 Relationship to Patient  _________________________________________  ______________________________  Signature of Witness          Date  Time      Patient Name: Jabier Patel     : 1975                 Printed: 2025     Medical Record #: [id number]                     Page 1 of [address] [place] [address],  [place]  [address]    Consent for Anesthesia    IJabier agree to be cared for by an anesthesiologist, who is specially trained to monitor me and give me medicine to put me to sleep or keep me comfortable during my procedure    I understand that my anesthesiologist is not an employee or agent of [facility] or [employer]. He or she works for [employer].    As the patient asking for anesthesia services, I agree to:  Allow the anesthesiologist (anesthesia doctor) to give me medicine and do additional procedures as necessary. Some examples are: Starting or using an “IV” to give me medicine, fluids or blood during my procedure, and having a breathing tube placed to help me breathe when I’m asleep (intubation). In the event that my heart stops working properly, I understand that my anesthesiologist will make every effort to sustain my life, unless otherwise directed by [facility] Do Not Resuscitate documents.  Tell my anesthesia doctor before my procedure:  If I am pregnant.  The last time that I ate or drank.  All of the medicines I take (including prescriptions, herbal supplements, and pills I can buy without a prescription (including street drugs/illegal medications). Failure to inform my anesthesiologist about these medicines may increase my risk of anesthetic complications.  If I am allergic to anything or have had a reaction to anesthesia before.  I understand how the anesthesia medicine will help me (benefits).  I understand that with any type of anesthesia medicine there are risks:  The most common risks are: nausea, vomiting, sore throat, muscle soreness, damage to my eyes, mouth, or teeth (from breathing tube placement).  Rare risks include: remembering what happened during my procedure, allergic reactions to medications, injury to my airway, heart, lungs, vision, nerves, or muscles and in extremely rare instances death.  My doctor has explained to me other  choices available to me for my care (alternatives).  Pregnant Patients (“epidural”):  I understand that the risks of having an epidural (medicine given into my back to help control pain during labor), include itching, low blood pressure, difficulty urinating, headache or slowing of the baby’s heart. Very rare risks include infection, bleeding, seizure, irregular heart rhythms and nerve injury.  Regional Anesthesia (“spinal”, “epidural”, & “nerve blocks”):  I understand that rare but potential complications include headache, bleeding, infection, seizure, irregular heart rhythms, and nerve injury.    I can change my mind about having anesthesia services at any time before I get the medicine.    _____________________________________________________________________________  Patient (or Representative) Signature/Relationship to Patient  Date   Time    _____________________________________________________________________________   Name (if used)    Language/Organization   Time    _____________________________________________________________________________  Anesthesiologist Signature     Date   Time  I have discussed the procedure and information above with the patient (or patient’s representative) and answered their questions. The patient or their representative has agreed to have anesthesia services.    _____________________________________________________________________________  Witness        Date   Time  I have verified that the signature is that of the patient or patient’s representative, and that it was signed before the procedure  Patient Name: Jabier Patel     : 1975                 Printed: 2025     Medical Record #: [id number]                     Page 2 of 2

## (undated) NOTE — MR AVS SNAPSHOT
[facility] [address]  [address] [place] [facility] Sherwin Amanda  [phone]               Thank you for choosing us for your health care visit with Kt Pisano MD.  We are glad to serve you and happy to provide you with t physician's office. At that time, you will be provided with any authorization numbers or be assured that none are required. You can then schedule your appointment.  Failure to obtain required authorization numbers can create reimbursement difficulties for y The TRUTH about Weight Loss: These are studied proven facts. 1. 96% of patients trying to lose weight in program such as Weight watchers/[facility] will give up in 1 year.   But those patients that are committed and stay accountable to their exercise and such that you can talk but cannot sing, elevated heart rate- and again time is MINIMUM 4-6 hours a week, any exercise whether it's cardio vs strength/weights does not make a difference. 7. No dieting/weight loss program during pregnancy.  That means you consider to use open access scheduling which is through the [employer] website [email or web address] and type in Tarah Hughes MD and follow the links for \"SCHEDULE ONLINE NOW\"    •The Lab is here [address] Mon, Tues, Friday 8am-4pm in office, Wed and Thurs 7am- please call Central Scheduling at [phone]  Please allow our office 5 business days to contact you regarding any testing results.     Refill policies:   Allow 3 business days for refills; controlled substances may take longer and must be picked up from You can access your MyChart to more actively manage your health care and view more details from this visit by going to [email or web address].   If you've recently had a stay at the Hospital you can access your discharge instructions in [address] by nikki

## (undated) NOTE — LETTER
April 27, 2025    Patient: Jabier Patel   Date of Visit: 4/26/2025       To Whom It May Concern:    Jabier Patel was seen and treated in our emergency department on 4/26/2025 and discharged on 4/27/2025. He should not return to work until cleared by the general surgeon  .    If you have any questions or concerns, please don't hesitate to call.       Encounter Provider(s):    Jaylen Medeiros DO Ragothaman, Arun, MD Ghelani, Neal, DO Sugrue, Jeremy, MD

## (undated) NOTE — LETTER
[facility] [place]  [address] [address]  Dept: [phone]  Dept Fax: [phone]         March 9, 2019    Patient: Jeovanny Jules   YOB: 1975   Date of Visit: 3/9/2019       To Whom It May Concern

## (undated) NOTE — Clinical Note
April 27, 2025    Patient: Jabier Patel   Date of Visit: 4/26/2025       To Whom It May Concern:    Jabier Patel was seen and treated in our emergency department on 4/26/2025. He {Return to school/sport/work:[id number]}.    If you have any questions or concerns, please don't hesitate to call.       Encounter Provider(s):    Jaylen Medeiros DO Ragothaman, Arun, MD Ghelani, Neal, DO

## (undated) NOTE — MR AVS SNAPSHOT
[facility] [address]  [address] [facility]  [phone]               Thank you for choosing us for your health care visit with Krista Reid MD.  We are glad to serve you and happy to provide you with t •Walk in Clinic in [place] [facility].  [address] Mon-Fri at 8am-7:30pm, and Sat/Sun 9am-430pm  Also at [address]  Call [phone] for info    • Please call our office about any questions regarding your treatment/medicines/tests as a re Bee stings-has epinephrine pen    Vicodin [Hydrocodone-Acetaminophen] Itching    itching                Today's Vital Signs     BP Pulse Temp Height Weight BMI    130/80 mmHg 78 97.7 °F (36.5 °C) (Temporal) 71\" 236 lb 32.93 kg/m2         Current Medicati Visit [facility] online at  [facility].tn

## (undated) NOTE — MR AVS SNAPSHOT
[facility] [address]  [address] Nerissa Skiff  [phone]               Thank you for choosing us for your health care visit with Franky Valentine MD.  We are glad to serve you and happy to provide you with t health problems such as  ? Diabetes  ? High blood pressure  ? High cholesterol   ? Heart disease (including heart attacks)  ? Stroke  ? Sleep apnea (a disorder in which you stop breathing for short periods while asleep)  ? Asthma  ? Cancer  Does being obese shorte too the truth is you just need to stick to it. Low calorie is less than 3623-3981 calories a day. 5. Keep a food log. To adhere to diet better- get an Khai on your smartphone or keep a food journal to count calories.  Note the times and what you are eati - berries   - oranges   - tangerines   - black rice  - mangoes   - pears   - peaches   - low fat greek yogurt   - sweet potatoes   - peas   - black beans   - kidney beans   - lentils   - brown rice   - barley   - amaranth   - quinoa   - whole grain bread Eye Disease: Hypertension can damage the very small blood vessels in the retina.   Bleeding from the aorta, the large blood vessel that supplies blood to the abdomen, pelvis, and legs   Heart failure   Poor blood supply to the legs  Erectile Dysfunction  Pr includes foods that contain far more potassium (4,700 milligrams (mg)/day), calcium (1,250 mg/day), and magnesium (500 mg/day) and much less sodium (salt) than the typical American diet.   Limit sodium to no more than 2,300 mg a day (eating only 1,500 mg a Examples of one serving include 1 cup raw leafy green vegetables or 1/2 cup cut-up raw or cooked vegetables.    ? Don't think of vegetables only as side dishes — a hearty blend of vegetables served over brown rice or whole-wheat noodles can serve as the [employer] twist.   ? If you have trouble digesting dairy products, choose lactose-free products or consider taking an over-the-counter product that contains the enzyme lactase, which can reduce or prevent the symptoms of lactose intolerance. ?  Go easy on regular a alternative to meat because they contain all of the amino acids your body needs to make a complete protein, just like meat. They also contain isoflavones, a type of natural plant compound (phytochemical) that has been shown to have some health benefits. sugar. But remember that you still must use them sensibly. It's OK to swap a diet cola for a regular cola, but not in place of a more nutritious beverage such as low-fat milk or even plain water.    ? Cut back on added sugar, which has no nutritional value Even low-fat soups, canned vegetables, ready-to-eat cereals and sliced turkey from the local deli — all foods you may have considered healthy — often have lots of sodium.    You may not notice a difference in taste when you choose low-sodium food and beverdamien All therapies have potential risk of harm or side effects or medication interactions.  It is your duty and for your safety to discuss with the pharmacist and our office with questions, and to notify us and stop treatment if problems arise, but know that ou What changed:  Another medication with the same name was added. Make sure you understand how and when to take each. Commonly known as:  ZYLOPRIM           * allopurinol 300 MG Tabs   Take 1 tablet (300 mg total) by mouth daily.  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 Remember, [employer] is NOT to be used for urgent needs. For medical emergencies, dial 911.         Educational Information     Healthy Diet and Regular Exercise  The [employer]ou

## (undated) NOTE — LETTER
Jamey Mckeon M.D., F.A.C.S. Philip Walker M.D., F.A.C.S. Okey Hashimoto M.D., Jeevan Suarez M.D., F.A.C.SDahlia Marrero. Avani Strong M.D., F.A.C.S. ROSALINO Estrella M. Abelardo Furth A.D. Darrow Sings, M.D., F.A.C.S. Daily Nelson. Will Lyman M.D., F.A.C.S. Rashard An M.D., F.A.C.S. Aracely Menendez M.D., F.A.C.S. Edythe Jeans Giovanni Joy, M.D. F.A.C.SDahlia Orozco. Lovely Yun M.D., F.A.C.S. Mohit Pulido M.D., F.A.C.S. Urbano Cuellar M.D., F.A.C.S., F.A.C.CDahlia Barrios M.D., F.A.C.S. Cullen Chi M.D., F.A.C.S. Samia Delong M.D., F.A.C.SDahlia [facility]. Ray Vergara M.D., F.A.C.S. Renae Marino M.D. Inge Kline M.D., Jeevan Osman. THOM Kwong M.D. Harley Yanes M.D., F.A.C.S. Charmayne Roux. Dilcia Farooq M.D., F.A.C.S. ROSALINO Jeffers M.D. Gwendel Cornish, M.D. PhD.  Chioma Mcfarland M.D. Julieta Gonzalez M.D. ANIA Jolley. Javid Sena M.D. Andrew Oates M.D. Sydni Pruitt M.D. Cecelia Haddad M.D.   Avinash Bahena D.O., F.A.C.S. Mayra Pak M.D., F.A.C.S. Viktoria Bates M.D. Welcome to [facility] As you contemplate possible surgical treatment, it is very important to us that you understand fully what is being discussed, that all of your questions have been answered, and that your options for treatment have been fully explained. To that end, on the following page we will ask you some questions to make certain that you understand everything which has been explained to you. Included in this understanding is that there are both surgical and nonsurgical treatments available for you, that you have options regarding where your care is given, and what doctors are involved in your care. Included in these options would, of course, be the option to elect for no treatment whatsoever.  We especially want to be sure that you have had a chance to have all of your questions and concerns answered. If there are any issues which have not been adequately addressed, we ask you to bring them forward so that we can thoroughly address them. A patient who is fully informed and understands their condition and options for treatment, as well as potential adverse effects of treatment, is going to be a patient who receives the most benefit out of care rendered. Our goal in addition to providing excellent surgical care is to provide the necessary information to you and your family in order to make decisions which are appropriate relative to your own care. Please take the time necessary to read and answer the questions on the next page. Again, if you have any questions, bring them forward and we will certainly address them. Sincerely,    [facility].    _______________________  ____________________________________  Date:                                             Patient Signature  ________________________  Adrian Chang  Witness Consent Form         Revised: 2015  Patient Name: Adrian Chang     : 1975                 Printed: 6/15/13 9:43 AM     Medical Record #: [id number]                Page: 1 of  2        [facility] Supplemental Consent Form    A [facility] surgeon has met with me and explained the matter of my illness, and what treatments might be available to improve my condition. As a result of that conversation, I understand the following:    A [facility] surgeon met with me and explained, in detail, the nature of my condition for which surgery is being contemplated.  The procedure to be performed  Is: CORONARY ARTERY BYPASS GRAFT USING PLATES            Yes _____ No _____    A [facility] surgeon has explained to me that there are alternatives to surgery which might include no surgery, medical therapy, or interventional treatment, among other options and the risks and benefits of the different treatment options:    Yes _____ No _____    A [facility] surgeon as explained to me that if I should so desire, he/she is willing to explain my case and the surgical and non-surgical options to family members: Yes _____ No _____    A [facility] surgeon has answered all of my questions regarding the topics we have discussed. I have been invited to ask more questions:  Yes _____ No _____    A [facility] surgeon has explained to me that if I seek other options or wish treatment at another facility in [place] or [place], or anywhere in the [place], that his/her office will assist me in making such accommodations:   Yes _____ No _____    A [facility] surgeon has explained to me, that death, risk of bleeding, stroke, multi-organ failure, heart attack or other complications are risks for the proposed surgical procedure: Yes _____ No _____    A [facility] surgeon has explained to me that I have the right to cancel or postpone the surgery at any time prior to the start of surgery: Yes _____ No _____    The nature and options for treatment for my condition have been explained to me, in detail, by a [facility] surgeon and all questions have been answered to my satisfaction. I understand that I am not required to undergo surgery, and further, that if I so desire, I could have surgery accomplished by another surgeon or at another institution. I understand and accept that which has been explained to me.  I am able to make my decisions knowingly and willfully based on the data.    ______________________   __________________________________  Date       Patient Signature  ______________________________ Ahmad Showman  Witness Consent Form   Patient Name: Anne-Marie Carpenter: 7/26/1975                 Medical Record #: [id number]    Page: 2 of 2        Printed: February 8, 2023